# Patient Record
Sex: MALE | Race: WHITE | NOT HISPANIC OR LATINO | Employment: OTHER | URBAN - METROPOLITAN AREA
[De-identification: names, ages, dates, MRNs, and addresses within clinical notes are randomized per-mention and may not be internally consistent; named-entity substitution may affect disease eponyms.]

---

## 2017-01-04 ENCOUNTER — LAB (OUTPATIENT)
Dept: LAB | Facility: HOSPITAL | Age: 52
End: 2017-01-04
Attending: UROLOGY

## 2017-01-04 ENCOUNTER — HOSPITAL ENCOUNTER (OUTPATIENT)
Dept: CARDIOLOGY | Facility: HOSPITAL | Age: 52
Discharge: HOME | End: 2017-01-04
Attending: UROLOGY | Admitting: UROLOGY

## 2017-01-04 ENCOUNTER — TRANSCRIBE ORDERS (OUTPATIENT)
Dept: ADMINISTRATIVE | Facility: HOSPITAL | Age: 52
End: 2017-01-04

## 2017-01-04 DIAGNOSIS — N43.3 HYDROCELE, UNSPECIFIED: ICD-10-CM

## 2017-01-04 DIAGNOSIS — K40.91 UNILATERAL INGUINAL HERNIA WITHOUT OBSTRUCTION OR GANGRENE, RECURRENT: Primary | ICD-10-CM

## 2017-01-04 DIAGNOSIS — K40.91 UNILATERAL INGUINAL HERNIA WITHOUT OBSTRUCTION OR GANGRENE, RECURRENT: ICD-10-CM

## 2017-01-04 LAB
ANION GAP SERPL CALCULATED.3IONS-SCNC: 16 MMOL/L
BASOPHILS # BLD AUTO: 0.01 10*3/MM3 (ref 0–0.2)
BASOPHILS NFR BLD AUTO: 0.2 % (ref 0–2)
BUN BLD-MCNC: 15 MG/DL (ref 6–20)
BUN/CREAT SERPL: 16.5 (ref 7–25)
CALCIUM SPEC-SCNC: 9.1 MG/DL (ref 8.6–10.5)
CHLORIDE SERPL-SCNC: 96 MMOL/L (ref 98–107)
CO2 SERPL-SCNC: 24 MMOL/L (ref 22–29)
CREAT BLD-MCNC: 0.91 MG/DL (ref 0.76–1.27)
DEPRECATED RDW RBC AUTO: 41.1 FL (ref 37–54)
EOSINOPHIL # BLD AUTO: 0.18 10*3/MM3 (ref 0.1–0.3)
EOSINOPHIL NFR BLD AUTO: 2.7 % (ref 0–4)
ERYTHROCYTE [DISTWIDTH] IN BLOOD BY AUTOMATED COUNT: 13.2 % (ref 11.5–14.5)
GFR SERPL CREATININE-BSD FRML MDRD: 88 ML/MIN/1.73
GLUCOSE BLD-MCNC: 279 MG/DL (ref 65–99)
HCT VFR BLD AUTO: 46.4 % (ref 42–52)
HGB BLD-MCNC: 15.9 G/DL (ref 14–18)
IMM GRANULOCYTES # BLD: 0.03 10*3/MM3 (ref 0–0.03)
IMM GRANULOCYTES NFR BLD: 0.5 % (ref 0–0.5)
LYMPHOCYTES # BLD AUTO: 2.01 10*3/MM3 (ref 0.6–4.8)
LYMPHOCYTES NFR BLD AUTO: 30.4 % (ref 20–45)
MCH RBC QN AUTO: 29.3 PG (ref 27–31)
MCHC RBC AUTO-ENTMCNC: 34.3 G/DL (ref 31–37)
MCV RBC AUTO: 85.5 FL (ref 80–94)
MONOCYTES # BLD AUTO: 0.39 10*3/MM3 (ref 0–1)
MONOCYTES NFR BLD AUTO: 5.9 % (ref 3–8)
NEUTROPHILS # BLD AUTO: 4 10*3/MM3 (ref 1.5–8.3)
NEUTROPHILS NFR BLD AUTO: 60.3 % (ref 45–70)
NRBC BLD MANUAL-RTO: 0 /100 WBC (ref 0–0)
PLATELET # BLD AUTO: 217 10*3/MM3 (ref 140–500)
PMV BLD AUTO: 9.2 FL (ref 7.4–10.4)
POTASSIUM BLD-SCNC: 3.7 MMOL/L (ref 3.5–5.2)
RBC # BLD AUTO: 5.43 10*6/MM3 (ref 4.7–6.1)
SODIUM BLD-SCNC: 136 MMOL/L (ref 136–145)
WBC NRBC COR # BLD: 6.62 10*3/MM3 (ref 4.8–10.8)

## 2017-01-04 PROCEDURE — 85025 COMPLETE CBC W/AUTO DIFF WBC: CPT

## 2017-01-04 PROCEDURE — 80048 BASIC METABOLIC PNL TOTAL CA: CPT

## 2017-01-04 PROCEDURE — 93010 ELECTROCARDIOGRAM REPORT: CPT | Performed by: INTERNAL MEDICINE

## 2017-01-04 PROCEDURE — 93005 ELECTROCARDIOGRAM TRACING: CPT

## 2017-01-04 PROCEDURE — 36415 COLL VENOUS BLD VENIPUNCTURE: CPT

## 2017-01-13 ENCOUNTER — HOSPITAL ENCOUNTER (OUTPATIENT)
Dept: OTHER | Facility: HOSPITAL | Age: 52
Setting detail: SPECIMEN
Discharge: HOME | End: 2017-01-13
Attending: UROLOGY | Admitting: UROLOGY

## 2017-01-18 RX ORDER — LISINOPRIL 10 MG/1
TABLET ORAL
Qty: 90 TABLET | Refills: 1 | Status: SHIPPED | OUTPATIENT
Start: 2017-01-18 | End: 2017-03-15 | Stop reason: SDUPTHER

## 2017-02-15 ENCOUNTER — HOSPITAL ENCOUNTER (EMERGENCY)
Facility: HOSPITAL | Age: 52
Discharge: HOME | End: 2017-02-15
Attending: EMERGENCY MEDICINE | Admitting: EMERGENCY MEDICINE

## 2017-02-15 ENCOUNTER — APPOINTMENT (OUTPATIENT)
Dept: GENERAL RADIOLOGY | Facility: HOSPITAL | Age: 52
End: 2017-02-15

## 2017-02-15 VITALS
DIASTOLIC BLOOD PRESSURE: 79 MMHG | BODY MASS INDEX: 33.88 KG/M2 | TEMPERATURE: 99.1 F | RESPIRATION RATE: 18 BRPM | HEIGHT: 71 IN | HEART RATE: 67 BPM | SYSTOLIC BLOOD PRESSURE: 125 MMHG | WEIGHT: 242 LBS | OXYGEN SATURATION: 92 %

## 2017-02-15 DIAGNOSIS — R09.1 PLEURISY: Primary | ICD-10-CM

## 2017-02-15 LAB
ALBUMIN SERPL-MCNC: 4.2 G/DL (ref 3.5–5.2)
ALBUMIN/GLOB SERPL: 1.6 G/DL
ALP SERPL-CCNC: 67 U/L (ref 40–129)
ALT SERPL W P-5'-P-CCNC: 24 U/L (ref 5–41)
ANION GAP SERPL CALCULATED.3IONS-SCNC: 14.2 MMOL/L
AST SERPL-CCNC: 20 U/L (ref 5–40)
BASOPHILS # BLD AUTO: 0.02 10*3/MM3 (ref 0–0.2)
BASOPHILS NFR BLD AUTO: 0.2 % (ref 0–2)
BILIRUB SERPL-MCNC: 0.8 MG/DL (ref 0.2–1.2)
BUN BLD-MCNC: 11 MG/DL (ref 6–20)
BUN/CREAT SERPL: 12.6 (ref 7–25)
CALCIUM SPEC-SCNC: 9.4 MG/DL (ref 8.6–10.5)
CHLORIDE SERPL-SCNC: 100 MMOL/L (ref 98–107)
CO2 SERPL-SCNC: 23.8 MMOL/L (ref 22–29)
CREAT BLD-MCNC: 0.87 MG/DL (ref 0.76–1.27)
D DIMER PPP FEU-MCNC: 0.35 MCGFEU/ML (ref 0–0.46)
DEPRECATED RDW RBC AUTO: 42.7 FL (ref 37–54)
EOSINOPHIL # BLD AUTO: 0.25 10*3/MM3 (ref 0.1–0.3)
EOSINOPHIL NFR BLD AUTO: 2.9 % (ref 0–4)
ERYTHROCYTE [DISTWIDTH] IN BLOOD BY AUTOMATED COUNT: 13.8 % (ref 11.5–14.5)
GFR SERPL CREATININE-BSD FRML MDRD: 93 ML/MIN/1.73
GLOBULIN UR ELPH-MCNC: 2.7 GM/DL
GLUCOSE BLD-MCNC: 238 MG/DL (ref 65–99)
HCT VFR BLD AUTO: 43.4 % (ref 42–52)
HGB BLD-MCNC: 14.8 G/DL (ref 14–18)
IMM GRANULOCYTES # BLD: 0.02 10*3/MM3 (ref 0–0.03)
IMM GRANULOCYTES NFR BLD: 0.2 % (ref 0–0.5)
LYMPHOCYTES # BLD AUTO: 2.31 10*3/MM3 (ref 0.6–4.8)
LYMPHOCYTES NFR BLD AUTO: 26.9 % (ref 20–45)
MCH RBC QN AUTO: 29.2 PG (ref 27–31)
MCHC RBC AUTO-ENTMCNC: 34.1 G/DL (ref 31–37)
MCV RBC AUTO: 85.6 FL (ref 80–94)
MONOCYTES # BLD AUTO: 0.54 10*3/MM3 (ref 0–1)
MONOCYTES NFR BLD AUTO: 6.3 % (ref 3–8)
NEUTROPHILS # BLD AUTO: 5.45 10*3/MM3 (ref 1.5–8.3)
NEUTROPHILS NFR BLD AUTO: 63.5 % (ref 45–70)
NRBC BLD MANUAL-RTO: 0 /100 WBC (ref 0–0)
PLATELET # BLD AUTO: 227 10*3/MM3 (ref 140–500)
PMV BLD AUTO: 9.5 FL (ref 7.4–10.4)
POTASSIUM BLD-SCNC: 4.1 MMOL/L (ref 3.5–5.2)
PROT SERPL-MCNC: 6.9 G/DL (ref 6–8.5)
RBC # BLD AUTO: 5.07 10*6/MM3 (ref 4.7–6.1)
SODIUM BLD-SCNC: 138 MMOL/L (ref 136–145)
TROPONIN T SERPL-MCNC: <0.01 NG/ML (ref 0–0.03)
WBC NRBC COR # BLD: 8.59 10*3/MM3 (ref 4.8–10.8)

## 2017-02-15 PROCEDURE — 80053 COMPREHEN METABOLIC PANEL: CPT | Performed by: EMERGENCY MEDICINE

## 2017-02-15 PROCEDURE — 85379 FIBRIN DEGRADATION QUANT: CPT | Performed by: EMERGENCY MEDICINE

## 2017-02-15 PROCEDURE — 99284 EMERGENCY DEPT VISIT MOD MDM: CPT

## 2017-02-15 PROCEDURE — 93005 ELECTROCARDIOGRAM TRACING: CPT | Performed by: EMERGENCY MEDICINE

## 2017-02-15 PROCEDURE — 85025 COMPLETE CBC W/AUTO DIFF WBC: CPT | Performed by: EMERGENCY MEDICINE

## 2017-02-15 PROCEDURE — 84484 ASSAY OF TROPONIN QUANT: CPT | Performed by: EMERGENCY MEDICINE

## 2017-02-15 PROCEDURE — 93010 ELECTROCARDIOGRAM REPORT: CPT | Performed by: INTERNAL MEDICINE

## 2017-02-15 PROCEDURE — 71020 HC CHEST PA AND LATERAL: CPT

## 2017-02-15 RX ORDER — DICLOFENAC SODIUM 75 MG/1
75 TABLET, DELAYED RELEASE ORAL 2 TIMES DAILY
Qty: 20 TABLET | Refills: 0 | Status: SHIPPED | OUTPATIENT
Start: 2017-02-15 | End: 2017-02-25

## 2017-02-15 NOTE — ED PROVIDER NOTES
"Subjective     History provided by:  Patient and spouse    History of Present Illness    · Chief complaint: Chest pain    · Location: Left lateral lower chest in the midaxillary line    · Quality/Severity: \"Like a nail is sticking in me\".  The patient states that he is not short of breath, but that it hurts to breathe.    · Timing/Onset: The pain started 2-1/2 weeks ago.    · Modifying Factors: Breathing and bending over exacerbates pain.  Holding still and not breathing alleviates pain.    · Associated symptoms: The patient reports an occasional nonproductive cough.  His wife states his blood pressure was low today at 97/50.    Narrative: The patient is a 51-year-old white male complaining of a 2-1/2 week history of a sharp stabbing pain in his left lateral lower chest when he breathes or bends over.  There is no pain at rest without breathing.  The patient is not sought medical attention for this complaint until now.  He has an occasional nonproductive cough.  The patient has a history of degenerative disc disease and is status post surgery with rods and plating of his lumbar and cervical spine.  He also has a history not significant for diabetes mellitus hypertension gout and cholesterol.  He is status post left inguinal hernia repair by Dr. Her January 13 of this year.  The patient is a smoker of cigarettes a half pack per day and smokes 8 height that he does inhale.    ED Triage Vitals   Temp Heart Rate Resp BP SpO2   02/15/17 1615 02/15/17 1614 02/15/17 1614 02/15/17 1614 02/15/17 1614   99.1 °F (37.3 °C) 73 18 149/94 94 %      Temp src Heart Rate Source Patient Position BP Location FiO2 (%)   -- -- -- -- --              Review of Systems   Constitutional: Negative for activity change, appetite change, chills, diaphoresis, fatigue and fever.   HENT: Negative for congestion, dental problem, ear pain, hearing loss, mouth sores, postnasal drip, rhinorrhea, sinus pressure, sore throat and voice change.    Eyes: " Negative for photophobia, pain, discharge, redness and visual disturbance.   Respiratory: Positive for cough. Negative for chest tightness, shortness of breath, wheezing and stridor.         Patient states it hurts to breathe   Cardiovascular: Positive for chest pain. Negative for palpitations and leg swelling.   Gastrointestinal: Negative for abdominal pain, diarrhea, nausea and vomiting.   Genitourinary: Negative for difficulty urinating, dysuria, flank pain, frequency, hematuria and urgency.   Musculoskeletal: Negative for arthralgias, back pain, gait problem, joint swelling, myalgias, neck pain and neck stiffness.   Skin: Negative for color change and rash.   Neurological: Negative for dizziness, tremors, seizures, syncope, facial asymmetry, speech difficulty, weakness, light-headedness, numbness and headaches.   Hematological: Negative for adenopathy.   Psychiatric/Behavioral: Negative.  Negative for confusion and decreased concentration. The patient is not nervous/anxious.        Past Medical History   Diagnosis Date   • Diabetes mellitus    • Gout    • Hyperlipidemia    • Hypertension    • Kidney stone        Allergies   Allergen Reactions   • Fexofenadine-Pseudoephed Er        Past Surgical History   Procedure Laterality Date   • Knee arthroplasty     • Shoulder arthroscopy     • Back surgery     • Neck surgery     • Elbow arthroplasty     • Ankle arthroplasty     • Leg surgery     • Tonsillectomy     • Adenoidectomy     • Gallbladder surgery     • Hernia repair     • Cholecystectomy     • Testicle surgery         Family History   Problem Relation Age of Onset   • Stroke Mother    • Hypertension Mother    • Alzheimer's disease Father    • Dementia Father    • Hypertension Father    • Alcohol abuse Sister    • Hypertension Sister        Social History     Social History   • Marital status:      Spouse name: N/A   • Number of children: N/A   • Years of education: N/A     Social History Main Topics   •  Smoking status: Current Some Day Smoker     Types: Pipe   • Smokeless tobacco: Current User     Types: Chew      Comment: 1-2 times a month   • Alcohol use Yes      Comment: monthly   • Drug use: No   • Sexual activity: Defer     Other Topics Concern   • None     Social History Narrative   • None           Objective   Physical Exam   Constitutional: He is oriented to person, place, and time. He appears well-developed and well-nourished. No distress.   HENT:   Head: Normocephalic and atraumatic.   Nose: Nose normal.   Mouth/Throat: Oropharynx is clear and moist. No oropharyngeal exudate.   Eyes: Conjunctivae and EOM are normal. Pupils are equal, round, and reactive to light. Right eye exhibits no discharge. Left eye exhibits no discharge. No scleral icterus.   Neck: Normal range of motion. Neck supple. No JVD present. No thyromegaly present.   Cardiovascular: Normal rate, regular rhythm and normal heart sounds.    No murmur heard.  Pulmonary/Chest: Effort normal and breath sounds normal. He has no wheezes. He has no rales. He exhibits tenderness.   There is left lateral lower chest wall tenderness to pressure in the midaxillary line that reproduces the patient's discomfort.   Abdominal: Soft. Bowel sounds are normal. He exhibits no distension. There is no tenderness.   Musculoskeletal: Normal range of motion. He exhibits no edema, tenderness or deformity.   Lymphadenopathy:     He has no cervical adenopathy.   Neurological: He is alert and oriented to person, place, and time. No cranial nerve deficit. Coordination normal.   No focal motor sensory deficit   Skin: Skin is warm and dry. No rash noted. He is not diaphoretic.   Psychiatric: He has a normal mood and affect. His behavior is normal. Judgment and thought content normal.   Nursing note and vitals reviewed.      ECG 12 Lead    Date/Time: 2/15/2017 5:49 PM  Performed by: YUSRA KINGSTON.  Authorized by: YUSRA KINGSTON   Comments: EKG performed 16:09, EKG read  16:11.  Normal sinus rhythm with a rate of 72, normal axis, normal conduction, no acute ST segment elevation or depression consistent with ischemia, inverted T waves V1 through V4 that is mild and nonspecific, no ectopy, normal CA and QT intervals.               ED Course  ED Course   Comment By Time   [facility] Report [id number] shows the patient filled oxycodone 10 mg #90 monthly up until January 14 prescribed by a doctor She[place] in [place]. Dimitris Doherty MD 02/15 1306                  MDM  Number of Diagnoses or Management Options  Pleurisy: new and requires workup     Amount and/or Complexity of Data Reviewed  Clinical lab tests: ordered and reviewed  Tests in the radiology section of CPT®: ordered and reviewed  Independent visualization of images, tracings, or specimens: yes    Risk of Complications, Morbidity, and/or Mortality  Presenting problems: high  Diagnostic procedures: high  Management options: high  General comments: My differential diagnosis for chest pain includes but is not limited to:  Muscle strain, costochondritis, myositis, pleurisy, rib fracture, intercostal neuritis, herpes zoster, tumor, myocardial infarction, coronary syndrome, unstable angina, angina, aortic dissection, mitral valve prolapse, pericarditis, palpitations, pulmonary embolus, pneumonia, pneumothorax, lung cancer, GERD, esophagitis, esophageal spasm    Patient Progress  Patient progress: stable      Final diagnoses:   Pleurisy            Dimitris Doherty MD  02/01/23 7215

## 2017-02-23 ENCOUNTER — OFFICE VISIT (OUTPATIENT)
Dept: FAMILY MEDICINE CLINIC | Facility: CLINIC | Age: 52
End: 2017-02-23

## 2017-02-23 VITALS
DIASTOLIC BLOOD PRESSURE: 82 MMHG | HEIGHT: 71 IN | OXYGEN SATURATION: 98 % | BODY MASS INDEX: 34.4 KG/M2 | TEMPERATURE: 98 F | RESPIRATION RATE: 18 BRPM | SYSTOLIC BLOOD PRESSURE: 140 MMHG | WEIGHT: 245.7 LBS | HEART RATE: 84 BPM

## 2017-02-23 DIAGNOSIS — IMO0001 UNCONTROLLED TYPE 2 DIABETES MELLITUS WITHOUT COMPLICATION, WITHOUT LONG-TERM CURRENT USE OF INSULIN: ICD-10-CM

## 2017-02-23 DIAGNOSIS — E53.8 VITAMIN B12 DEFICIENCY: ICD-10-CM

## 2017-02-23 DIAGNOSIS — Z12.5 PROSTATE CANCER SCREENING: ICD-10-CM

## 2017-02-23 DIAGNOSIS — M1A.0490 IDIOPATHIC CHRONIC GOUT OF HAND WITHOUT TOPHUS, UNSPECIFIED LATERALITY: ICD-10-CM

## 2017-02-23 DIAGNOSIS — I10 ESSENTIAL (PRIMARY) HYPERTENSION: ICD-10-CM

## 2017-02-23 DIAGNOSIS — R51.9 CHRONIC NONINTRACTABLE HEADACHE, UNSPECIFIED HEADACHE TYPE: Primary | ICD-10-CM

## 2017-02-23 DIAGNOSIS — G89.29 CHRONIC NONINTRACTABLE HEADACHE, UNSPECIFIED HEADACHE TYPE: Primary | ICD-10-CM

## 2017-02-23 DIAGNOSIS — E78.00 PURE HYPERCHOLESTEROLEMIA: ICD-10-CM

## 2017-02-23 PROCEDURE — 99214 OFFICE O/P EST MOD 30 MIN: CPT | Performed by: INTERNAL MEDICINE

## 2017-02-23 RX ORDER — AMLODIPINE BESYLATE 5 MG/1
5 TABLET ORAL DAILY
Qty: 30 TABLET | Refills: 5 | Status: SHIPPED | OUTPATIENT
Start: 2017-02-23 | End: 2017-07-19 | Stop reason: SDUPTHER

## 2017-02-23 RX ORDER — CEPHALEXIN 500 MG/1
250 CAPSULE ORAL 2 TIMES DAILY
COMMUNITY
End: 2017-07-19

## 2017-02-23 NOTE — PROGRESS NOTES
Subjective   Patient ID: Flaco Miles is a 51 y.o. male presents with   Chief Complaint   Patient presents with   • Follow-up     on lung issues       HPI - this patient's here today for follow-up of pleurisy.  About a week ago he had pleuritic pain and was seen by an urgent care doctor he was given diclofenac and the pleurisy is gone he feels fine his chest x-ray was clear.  The patient also complains of left sided headaches that occur about 3 times a week.  He describes it as a burning and a throbbing.  His blood pressure has been running just a little high.  He's also had some mild visual changes but I am not sure that his sugars controlled.    Assessment plan    Headache and hypertension continue lisinopril 10 AM had Norvasc 5 at bedtime    Vitamin B12 deficiency check a vitamin B12    Uncontrolled type 2 diabetes he's on metformin and will check an A1c    Pleurisy resolved    Gout we'll check a uric acid level he's on allopurinol    Prostate cancer screening will check a PSA    Hypercholesterolemia Lipitor 20 we'll check a fasting lipid profile.        Allergies   Allergen Reactions   • Fexofenadine-Pseudoephed Er        The following portions of the patient's history were reviewed and updated as appropriate: allergies, current medications, past family history, past medical history, past social history, past surgical history and problem list.      Review of Systems   Constitutional: Positive for fatigue.   Eyes: Positive for visual disturbance.   Respiratory: Negative.    Cardiovascular: Negative.    Gastrointestinal: Negative.    Endocrine: Negative.    Genitourinary: Negative.    Musculoskeletal: Positive for arthralgias, back pain, gait problem, joint swelling, myalgias and neck pain.   Neurological: Positive for headaches.   Psychiatric/Behavioral: Negative.        Objective     Vitals:    02/23/17 1102   BP: 140/82   Pulse: 84   Resp: 18   Temp: 98 °F (36.7 °C)   TempSrc: Oral   SpO2: 98%   Weight: 245  "lb 11.2 oz (111 kg)   Height: 70.5\" (179.1 cm)         Physical Exam   Constitutional: He is oriented to person, place, and time. He appears well-developed and well-nourished. No distress.   HENT:   Head: Normocephalic and atraumatic.   Eyes: Conjunctivae and EOM are normal. Pupils are equal, round, and reactive to light. Right eye exhibits no discharge. Left eye exhibits no discharge. No scleral icterus.   Neck: Normal range of motion. Neck supple. No tracheal deviation present. No thyromegaly present.   Cardiovascular: Normal rate, regular rhythm, normal heart sounds, intact distal pulses and normal pulses.  Exam reveals no gallop.    No murmur heard.  Pulmonary/Chest: Effort normal and breath sounds normal. No respiratory distress. He has no wheezes. He has no rales.   Musculoskeletal: Normal range of motion.   Neurological: He is alert and oriented to person, place, and time.   Skin: Skin is warm. No rash noted. No erythema. No pallor.   Psychiatric: He has a normal mood and affect. His behavior is normal. Judgment and thought content normal.   Nursing note and vitals reviewed.        Flaco was seen today for follow-up.    Diagnoses and all orders for this visit:    Chronic nonintractable headache, unspecified headache type  -     PSA  -     Lipid Panel  -     Hemoglobin A1c  -     Microalbumin / Creatinine Urine Ratio  -     Comprehensive Metabolic Panel  -     Vitamin B12  -     Uric Acid    Vitamin B12 deficiency  -     PSA  -     Lipid Panel  -     Hemoglobin A1c  -     Microalbumin / Creatinine Urine Ratio  -     Comprehensive Metabolic Panel  -     Vitamin B12  -     Uric Acid    Uncontrolled type 2 diabetes mellitus without complication, without long-term current use of insulin  -     PSA  -     Lipid Panel  -     Hemoglobin A1c  -     Microalbumin / Creatinine Urine Ratio  -     Comprehensive Metabolic Panel  -     Vitamin B12  -     Uric Acid    Essential (primary) hypertension  -     PSA  -     Lipid " Panel  -     Hemoglobin A1c  -     Microalbumin / Creatinine Urine Ratio  -     Comprehensive Metabolic Panel  -     Vitamin B12  -     Uric Acid    Pure hypercholesterolemia  -     PSA  -     Lipid Panel  -     Hemoglobin A1c  -     Microalbumin / Creatinine Urine Ratio  -     Comprehensive Metabolic Panel  -     Vitamin B12  -     Uric Acid    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        Call or return to clinic prn if these symptoms worsen or fail to improve as anticipated.

## 2017-02-25 LAB
ALBUMIN SERPL-MCNC: 4.6 G/DL (ref 3.5–5.2)
ALBUMIN/CREAT UR: 22.9 MG/G CREAT (ref 0–30)
ALBUMIN/GLOB SERPL: 1.7 G/DL
ALP SERPL-CCNC: 64 U/L (ref 39–117)
ALT SERPL-CCNC: 40 U/L (ref 1–41)
AST SERPL-CCNC: 30 U/L (ref 1–40)
BILIRUB SERPL-MCNC: 2 MG/DL (ref 0.1–1.2)
BUN SERPL-MCNC: 18 MG/DL (ref 6–20)
BUN/CREAT SERPL: 20.9 (ref 7–25)
CALCIUM SERPL-MCNC: 10 MG/DL (ref 8.6–10.5)
CHLORIDE SERPL-SCNC: 101 MMOL/L (ref 98–107)
CHOLEST SERPL-MCNC: 165 MG/DL (ref 0–200)
CO2 SERPL-SCNC: 27.7 MMOL/L (ref 22–29)
CREAT SERPL-MCNC: 0.86 MG/DL (ref 0.76–1.27)
CREAT UR-MCNC: 238.9 MG/DL
GLOBULIN SER CALC-MCNC: 2.7 GM/DL
GLUCOSE SERPL-MCNC: 136 MG/DL (ref 65–99)
HBA1C MFR BLD: 6.73 % (ref 4.8–5.6)
HDLC SERPL-MCNC: 41 MG/DL (ref 40–60)
LDLC SERPL CALC-MCNC: 89 MG/DL (ref 0–100)
MICROALBUMIN UR-MCNC: 54.7 UG/ML
POTASSIUM SERPL-SCNC: 4.5 MMOL/L (ref 3.5–5.2)
PROT SERPL-MCNC: 7.3 G/DL (ref 6–8.5)
PSA SERPL-MCNC: 3.1 NG/ML (ref 0–4)
SODIUM SERPL-SCNC: 143 MMOL/L (ref 136–145)
TRIGL SERPL-MCNC: 173 MG/DL (ref 0–150)
URATE SERPL-MCNC: 4.8 MG/DL (ref 3.4–7)
VIT B12 SERPL-MCNC: 662 PG/ML (ref 211–946)
VLDLC SERPL CALC-MCNC: 34.6 MG/DL (ref 5–40)

## 2017-02-27 ENCOUNTER — TELEPHONE (OUTPATIENT)
Dept: FAMILY MEDICINE CLINIC | Facility: CLINIC | Age: 52
End: 2017-02-27

## 2017-02-27 NOTE — TELEPHONE ENCOUNTER
----- Message from Hayden Morgan MD sent at 2/27/2017  7:51 AM EST -----  Check to see if the patient's headaches are any better      Pt informed and no headaches are no better

## 2017-03-02 ENCOUNTER — OFFICE VISIT (OUTPATIENT)
Dept: FAMILY MEDICINE CLINIC | Facility: CLINIC | Age: 52
End: 2017-03-02

## 2017-03-02 VITALS
WEIGHT: 245 LBS | HEART RATE: 78 BPM | BODY MASS INDEX: 34.3 KG/M2 | RESPIRATION RATE: 16 BRPM | DIASTOLIC BLOOD PRESSURE: 68 MMHG | OXYGEN SATURATION: 94 % | HEIGHT: 71 IN | SYSTOLIC BLOOD PRESSURE: 126 MMHG

## 2017-03-02 DIAGNOSIS — R51.9 CHRONIC INTRACTABLE HEADACHE, UNSPECIFIED HEADACHE TYPE: Primary | ICD-10-CM

## 2017-03-02 DIAGNOSIS — G89.29 CHRONIC INTRACTABLE HEADACHE, UNSPECIFIED HEADACHE TYPE: Primary | ICD-10-CM

## 2017-03-02 DIAGNOSIS — G44.59 OTHER COMPLICATED HEADACHE SYNDROME: ICD-10-CM

## 2017-03-02 DIAGNOSIS — E78.00 PURE HYPERCHOLESTEROLEMIA: ICD-10-CM

## 2017-03-02 DIAGNOSIS — I10 ESSENTIAL (PRIMARY) HYPERTENSION: ICD-10-CM

## 2017-03-02 DIAGNOSIS — E53.8 VITAMIN B12 DEFICIENCY: ICD-10-CM

## 2017-03-02 DIAGNOSIS — E11.9 DIABETES MELLITUS TYPE 2, NONINSULIN DEPENDENT (HCC): ICD-10-CM

## 2017-03-02 PROCEDURE — 99214 OFFICE O/P EST MOD 30 MIN: CPT | Performed by: INTERNAL MEDICINE

## 2017-03-02 RX ORDER — TAMSULOSIN HYDROCHLORIDE 0.4 MG/1
CAPSULE ORAL
COMMUNITY
Start: 2017-02-23 | End: 2017-07-19 | Stop reason: SDUPTHER

## 2017-03-02 NOTE — PROGRESS NOTES
"Subjective   Patient ID: Flaco Miles is a 51 y.o. male presents with   Chief Complaint   Patient presents with   • Follow-up     on labs       HPI - this patient's here today to review labs.  Overall his cholesterol panel has improved his uric acid levels down his blood pressures under control.  A1c is gone up just a bit but he says he's not been eating like he should.  He complains of chronic headaches been going on for a few years he also gets some visual changes with these.  He describes as a throbbing pressure on one side of his head.  Also describes a burning and visual changes.    Assessment plan    Chronic intractable headache-MRI of the brain    Hypertension controlled with amlodipine and lisinopril    Type 2 diabetes controlled with metformin, recommended weight loss    Gout controlled with allopurinol.    Allergies   Allergen Reactions   • Fexofenadine-Pseudoephed Er        The following portions of the patient's history were reviewed and updated as appropriate: allergies, current medications, past family history, past medical history, past social history, past surgical history and problem list.      Review of Systems   Constitutional: Positive for fatigue.   Eyes: Positive for visual disturbance.   Respiratory: Negative.    Cardiovascular: Negative.    Gastrointestinal: Negative.    Endocrine: Negative.    Genitourinary: Negative.    Musculoskeletal: Positive for arthralgias, back pain, gait problem, myalgias and neck pain. Negative for neck stiffness.   Skin: Negative.    Allergic/Immunologic: Negative.    Neurological: Positive for headaches.   Hematological: Negative.    Psychiatric/Behavioral: Negative.        Objective     Vitals:    03/02/17 1057   BP: 126/68   Pulse: 78   Resp: 16   SpO2: 94%   Weight: 245 lb (111 kg)   Height: 70.5\" (179.1 cm)         Physical Exam   Constitutional: He is oriented to person, place, and time. He appears well-developed and well-nourished. No distress.   HENT: "   Head: Normocephalic and atraumatic.   Eyes: Conjunctivae and EOM are normal. Pupils are equal, round, and reactive to light. Right eye exhibits no discharge. Left eye exhibits no discharge. No scleral icterus.   Neck: Normal range of motion. Neck supple. No tracheal deviation present. No thyromegaly present.   Cardiovascular: Normal rate, regular rhythm, normal heart sounds and normal pulses.  Exam reveals no gallop.    No murmur heard.  Pulmonary/Chest: Effort normal and breath sounds normal. No respiratory distress. He has no wheezes. He has no rales.   Musculoskeletal: Normal range of motion.   Neurological: He is alert and oriented to person, place, and time.   Skin: Skin is warm. No rash noted. No erythema. No pallor.   Psychiatric: He has a normal mood and affect. His behavior is normal. Judgment and thought content normal.   Nursing note and vitals reviewed.        Flaco was seen today for follow-up.    Diagnoses and all orders for this visit:    Chronic intractable headache, unspecified headache type  -     MRI Brain With & Without Contrast; Future    Other complicated headache syndrome    Essential (primary) hypertension    Pure hypercholesterolemia    Vitamin B12 deficiency    Diabetes mellitus type 2, noninsulin dependent        Call or return to clinic prn if these symptoms worsen or fail to improve as anticipated.

## 2017-03-08 ENCOUNTER — HOSPITAL ENCOUNTER (OUTPATIENT)
Dept: MRI IMAGING | Facility: HOSPITAL | Age: 52
Discharge: HOME | End: 2017-03-08
Admitting: INTERNAL MEDICINE

## 2017-03-08 DIAGNOSIS — G89.29 CHRONIC INTRACTABLE HEADACHE, UNSPECIFIED HEADACHE TYPE: ICD-10-CM

## 2017-03-08 DIAGNOSIS — R51.9 CHRONIC INTRACTABLE HEADACHE, UNSPECIFIED HEADACHE TYPE: ICD-10-CM

## 2017-03-08 PROCEDURE — A9577 INJ MULTIHANCE: HCPCS | Performed by: INTERNAL MEDICINE

## 2017-03-08 PROCEDURE — 0 GADOBENATE DIMEGLUMINE 529 MG/ML SOLUTION: Performed by: INTERNAL MEDICINE

## 2017-03-08 PROCEDURE — 70553 MRI BRAIN STEM W/O & W/DYE: CPT

## 2017-03-08 RX ADMIN — GADOBENATE DIMEGLUMINE 20 ML: 529 INJECTION, SOLUTION INTRAVENOUS at 14:30

## 2017-03-13 RX ORDER — ALLOPURINOL 300 MG/1
TABLET ORAL
Qty: 30 TABLET | Refills: 6 | Status: SHIPPED | OUTPATIENT
Start: 2017-03-13 | End: 2017-07-19

## 2017-03-15 ENCOUNTER — OFFICE VISIT (OUTPATIENT)
Dept: FAMILY MEDICINE CLINIC | Facility: CLINIC | Age: 52
End: 2017-03-15

## 2017-03-15 VITALS
WEIGHT: 245 LBS | BODY MASS INDEX: 34.3 KG/M2 | OXYGEN SATURATION: 94 % | HEART RATE: 80 BPM | SYSTOLIC BLOOD PRESSURE: 120 MMHG | HEIGHT: 71 IN | RESPIRATION RATE: 16 BRPM | DIASTOLIC BLOOD PRESSURE: 74 MMHG | TEMPERATURE: 98 F

## 2017-03-15 DIAGNOSIS — R51.9 CHRONIC INTRACTABLE HEADACHE, UNSPECIFIED HEADACHE TYPE: ICD-10-CM

## 2017-03-15 DIAGNOSIS — G89.29 CHRONIC INTRACTABLE HEADACHE, UNSPECIFIED HEADACHE TYPE: ICD-10-CM

## 2017-03-15 DIAGNOSIS — I10 ESSENTIAL (PRIMARY) HYPERTENSION: Primary | ICD-10-CM

## 2017-03-15 PROCEDURE — 99213 OFFICE O/P EST LOW 20 MIN: CPT | Performed by: INTERNAL MEDICINE

## 2017-03-15 RX ORDER — SULFAMETHOXAZOLE AND TRIMETHOPRIM 800; 160 MG/1; MG/1
1 TABLET ORAL 2 TIMES DAILY
COMMUNITY
End: 2017-07-19

## 2017-03-15 RX ORDER — IBUPROFEN 800 MG/1
800 TABLET ORAL EVERY 6 HOURS PRN
COMMUNITY
End: 2017-07-19

## 2017-03-15 RX ORDER — LISINOPRIL 10 MG/1
10 TABLET ORAL DAILY
Qty: 90 TABLET | Refills: 1 | Status: SHIPPED | OUTPATIENT
Start: 2017-03-15 | End: 2017-07-19

## 2017-03-15 NOTE — PROGRESS NOTES
"Subjective   Patient ID: Flaco Miles is a 51 y.o. male presents with   Chief Complaint   Patient presents with   • MRI Results       HPI  - this patient had been having headaches for quite a while we did an MRI of his brain was essentially normal.  I started him on an extra blood pressure medicine last time, amlodipine 5 mg he's also on lisinopril 10 mg.  He says that since then his headaches have resolved.    Assessment plan    Hypertension now controlled with lisinopril and amlodipine.    Neuropathy improved with gabapentin.    Headaches resolved.    Allergies   Allergen Reactions   • Fexofenadine-Pseudoephed Er        The following portions of the patient's history were reviewed and updated as appropriate: allergies, current medications, past family history, past medical history, past social history, past surgical history and problem list.      Review of Systems   Constitutional: Negative.    Respiratory: Negative.    Cardiovascular: Negative.    Neurological: Positive for numbness. Negative for headaches.       Objective     Vitals:    03/15/17 1123   BP: 120/74   Pulse: 80   Resp: 16   Temp: 98 °F (36.7 °C)   TempSrc: Oral   SpO2: 94%   Weight: 245 lb (111 kg)   Height: 70.5\" (179.1 cm)         Physical Exam   Constitutional: He is oriented to person, place, and time. He appears well-developed and well-nourished.   HENT:   Head: Normocephalic and atraumatic.   Neurological: He is alert and oriented to person, place, and time.   Psychiatric: He has a normal mood and affect. His behavior is normal.   Nursing note and vitals reviewed.        Flaco was seen today for mri results.    Diagnoses and all orders for this visit:    Essential (primary) hypertension    Chronic intractable headache, unspecified headache type    Other orders  -     lisinopril (PRINIVIL,ZESTRIL) 10 MG tablet; Take 1 tablet by mouth Daily.      Call or return to clinic prn if these symptoms worsen or fail to improve as anticipated.  "

## 2017-05-18 RX ORDER — ATORVASTATIN CALCIUM 20 MG/1
TABLET, FILM COATED ORAL
Qty: 90 TABLET | Refills: 0 | Status: SHIPPED | OUTPATIENT
Start: 2017-05-18 | End: 2017-07-19 | Stop reason: SDUPTHER

## 2017-05-18 RX ORDER — GABAPENTIN 300 MG/1
CAPSULE ORAL
Qty: 360 CAPSULE | Refills: 0 | Status: SHIPPED | OUTPATIENT
Start: 2017-05-18 | End: 2017-10-24 | Stop reason: SDUPTHER

## 2017-07-19 ENCOUNTER — OFFICE VISIT (OUTPATIENT)
Dept: FAMILY MEDICINE CLINIC | Facility: CLINIC | Age: 52
End: 2017-07-19

## 2017-07-19 VITALS
BODY MASS INDEX: 33.96 KG/M2 | DIASTOLIC BLOOD PRESSURE: 78 MMHG | WEIGHT: 242.6 LBS | OXYGEN SATURATION: 93 % | RESPIRATION RATE: 16 BRPM | HEIGHT: 71 IN | SYSTOLIC BLOOD PRESSURE: 116 MMHG | HEART RATE: 74 BPM

## 2017-07-19 DIAGNOSIS — I10 ESSENTIAL (PRIMARY) HYPERTENSION: Primary | ICD-10-CM

## 2017-07-19 DIAGNOSIS — H10.13 ALLERGIC CONJUNCTIVITIS, BILATERAL: ICD-10-CM

## 2017-07-19 DIAGNOSIS — E78.00 PURE HYPERCHOLESTEROLEMIA: ICD-10-CM

## 2017-07-19 DIAGNOSIS — E11.9 DIABETES MELLITUS TYPE 2, NONINSULIN DEPENDENT (HCC): ICD-10-CM

## 2017-07-19 DIAGNOSIS — E53.8 VITAMIN B12 DEFICIENCY: ICD-10-CM

## 2017-07-19 DIAGNOSIS — R53.82 CHRONIC FATIGUE: ICD-10-CM

## 2017-07-19 DIAGNOSIS — G89.29 OTHER CHRONIC PAIN: ICD-10-CM

## 2017-07-19 PROBLEM — H10.10 ALLERGIC CONJUNCTIVITIS: Status: ACTIVE | Noted: 2017-07-19

## 2017-07-19 LAB
ALBUMIN SERPL-MCNC: 4.7 G/DL (ref 3.5–5.2)
ALBUMIN/GLOB SERPL: 1.6 G/DL
ALP SERPL-CCNC: 71 U/L (ref 39–117)
ALT SERPL-CCNC: 46 U/L (ref 1–41)
AST SERPL-CCNC: 34 U/L (ref 1–40)
BASOPHILS # BLD AUTO: 0.02 10*3/MM3 (ref 0–0.2)
BASOPHILS NFR BLD AUTO: 0.2 % (ref 0–1.5)
BILIRUB SERPL-MCNC: 1 MG/DL (ref 0.1–1.2)
BUN SERPL-MCNC: 19 MG/DL (ref 6–20)
BUN/CREAT SERPL: 21.3 (ref 7–25)
CALCIUM SERPL-MCNC: 11 MG/DL (ref 8.6–10.5)
CHLORIDE SERPL-SCNC: 101 MMOL/L (ref 98–107)
CHOLEST SERPL-MCNC: 159 MG/DL (ref 0–200)
CO2 SERPL-SCNC: 25.9 MMOL/L (ref 22–29)
CREAT SERPL-MCNC: 0.89 MG/DL (ref 0.76–1.27)
EOSINOPHIL # BLD AUTO: 0.33 10*3/MM3 (ref 0–0.7)
EOSINOPHIL NFR BLD AUTO: 2.9 % (ref 0.3–6.2)
ERYTHROCYTE [DISTWIDTH] IN BLOOD BY AUTOMATED COUNT: 14.1 % (ref 11.5–14.5)
GLOBULIN SER CALC-MCNC: 3 GM/DL
GLUCOSE SERPL-MCNC: 133 MG/DL (ref 65–99)
HBA1C MFR BLD: 6.8 % (ref 4.8–5.6)
HCT VFR BLD AUTO: 47.4 % (ref 40.4–52.2)
HDLC SERPL-MCNC: 40 MG/DL (ref 40–60)
HGB BLD-MCNC: 16.1 G/DL (ref 13.7–17.6)
IMM GRANULOCYTES # BLD: 0.03 10*3/MM3 (ref 0–0.03)
IMM GRANULOCYTES NFR BLD: 0.3 % (ref 0–0.5)
LDLC SERPL CALC-MCNC: 82 MG/DL (ref 0–100)
LYMPHOCYTES # BLD AUTO: 2.26 10*3/MM3 (ref 0.9–4.8)
LYMPHOCYTES NFR BLD AUTO: 20.2 % (ref 19.6–45.3)
MCH RBC QN AUTO: 31.1 PG (ref 27–32.7)
MCHC RBC AUTO-ENTMCNC: 34 G/DL (ref 32.6–36.4)
MCV RBC AUTO: 91.5 FL (ref 79.8–96.2)
MONOCYTES # BLD AUTO: 0.74 10*3/MM3 (ref 0.2–1.2)
MONOCYTES NFR BLD AUTO: 6.6 % (ref 5–12)
NEUTROPHILS # BLD AUTO: 7.83 10*3/MM3 (ref 1.9–8.1)
NEUTROPHILS NFR BLD AUTO: 69.8 % (ref 42.7–76)
PLATELET # BLD AUTO: 242 10*3/MM3 (ref 140–500)
POTASSIUM SERPL-SCNC: 4.6 MMOL/L (ref 3.5–5.2)
PROT SERPL-MCNC: 7.7 G/DL (ref 6–8.5)
RBC # BLD AUTO: 5.18 10*6/MM3 (ref 4.6–6)
SODIUM SERPL-SCNC: 142 MMOL/L (ref 136–145)
T4 FREE SERPL-MCNC: 1.15 NG/DL (ref 0.93–1.7)
TRIGL SERPL-MCNC: 184 MG/DL (ref 0–150)
TSH SERPL DL<=0.005 MIU/L-ACNC: 3.34 MIU/ML (ref 0.27–4.2)
VIT B12 SERPL-MCNC: >2000 PG/ML (ref 211–946)
VLDLC SERPL CALC-MCNC: 36.8 MG/DL (ref 5–40)
WBC # BLD AUTO: 11.21 10*3/MM3 (ref 4.5–10.7)

## 2017-07-19 PROCEDURE — 99214 OFFICE O/P EST MOD 30 MIN: CPT | Performed by: INTERNAL MEDICINE

## 2017-07-19 RX ORDER — ATORVASTATIN CALCIUM 20 MG/1
20 TABLET, FILM COATED ORAL DAILY
Qty: 30 TABLET | Refills: 5 | Status: SHIPPED | OUTPATIENT
Start: 2017-07-19 | End: 2018-01-29 | Stop reason: SDUPTHER

## 2017-07-19 RX ORDER — TAMSULOSIN HYDROCHLORIDE 0.4 MG/1
1 CAPSULE ORAL DAILY
Qty: 30 CAPSULE | Refills: 5 | Status: SHIPPED | OUTPATIENT
Start: 2017-07-19 | End: 2018-01-29 | Stop reason: SDUPTHER

## 2017-07-19 RX ORDER — CLOBETASOL PROPIONATE 0.5 MG/G
CREAM TOPICAL 2 TIMES DAILY PRN
Qty: 45 G | Refills: 0 | Status: SHIPPED | OUTPATIENT
Start: 2017-07-19 | End: 2019-04-16

## 2017-07-19 RX ORDER — AMLODIPINE BESYLATE 5 MG/1
5 TABLET ORAL DAILY
Qty: 30 TABLET | Refills: 5 | Status: SHIPPED | OUTPATIENT
Start: 2017-07-19 | End: 2018-01-29 | Stop reason: SDUPTHER

## 2017-07-19 RX ORDER — OLOPATADINE HYDROCHLORIDE 1 MG/ML
1 SOLUTION/ DROPS OPHTHALMIC 2 TIMES DAILY PRN
Qty: 5 ML | Refills: 5 | Status: SHIPPED | OUTPATIENT
Start: 2017-07-19 | End: 2017-08-28

## 2017-07-19 RX ORDER — TIZANIDINE HYDROCHLORIDE 4 MG/1
4 CAPSULE, GELATIN COATED ORAL 3 TIMES DAILY
Qty: 90 CAPSULE | Refills: 4 | Status: SHIPPED | OUTPATIENT
Start: 2017-07-19 | End: 2018-04-13

## 2017-07-19 NOTE — PROGRESS NOTES
"Subjective   Patient ID: Flaco Miles is a 51 y.o. male presents with   Chief Complaint   Patient presents with   • Hypertension     f/u   • Med Refill     on all meds       HPI - This patient has a history of hypertension type II non-insulin-dependent diabetes, vitamin B12 deficiency hypercholesterolemia chronic fatigue and he complains of itchy eyes suggestive of allergic conjunctivitis.  He has fatigue that's likely cause from sleep apnea he cannot tolerate a CPAP.  I offered to send him back to his sleep specialist he declines.    Assessment plan    Hypertension continue amlodipine, controlled    Hyperlipidemia Lipitor 20 fasting lipid profile CMP    Fatigue fatigue labs    Vitamin B12 deficiency check a vitamin B12 level    Diabetes type 2 metformin and lifestyle modification get an A1c and a CMP    Rash clobetasol when necessary    Allergies   Allergen Reactions   • Fexofenadine-Pseudoephed Er        The following portions of the patient's history were reviewed and updated as appropriate: allergies, current medications, past family history, past medical history, past social history, past surgical history and problem list.      Review of Systems   Constitutional: Positive for fatigue.   HENT: Negative.    Eyes: Negative.    Respiratory: Negative.    Cardiovascular: Negative.    Gastrointestinal: Negative.    Endocrine: Negative.    Genitourinary: Negative.    Musculoskeletal: Positive for arthralgias, back pain and gait problem.   Skin: Positive for rash.   Allergic/Immunologic: Negative.    Hematological: Negative.    Psychiatric/Behavioral: Negative.        Objective     Vitals:    07/19/17 1104   BP: 116/78   Pulse: 74   Resp: 16   SpO2: 93%   Weight: 242 lb 9.6 oz (110 kg)   Height: 70.5\" (179.1 cm)         Physical Exam   Constitutional: He is oriented to person, place, and time. He appears well-developed and well-nourished. No distress.   HENT:   Head: Normocephalic and atraumatic.   Eyes: " Conjunctivae and EOM are normal. Pupils are equal, round, and reactive to light. Right eye exhibits no discharge. Left eye exhibits no discharge. No scleral icterus.   Neck: Normal range of motion. Neck supple. No tracheal deviation present. No thyromegaly present.   Cardiovascular: Normal rate, regular rhythm, normal heart sounds and normal pulses.  Exam reveals no gallop.    No murmur heard.  Pulmonary/Chest: Effort normal and breath sounds normal. No respiratory distress. He has no wheezes. He has no rales.   Musculoskeletal: Normal range of motion.   Neurological: He is alert and oriented to person, place, and time.   Skin: Skin is warm. No rash noted. No erythema. No pallor.   Psychiatric: He has a normal mood and affect. His behavior is normal. Thought content normal.   Nursing note and vitals reviewed.        Flaco was seen today for hypertension and med refill.    Diagnoses and all orders for this visit:    Essential (primary) hypertension  -     Lipid Panel  -     Hemoglobin A1c  -     Comprehensive Metabolic Panel  -     Vitamin B12  -     CBC & Differential  -     TSH+Free T4    Pure hypercholesterolemia  -     Lipid Panel  -     Hemoglobin A1c  -     Comprehensive Metabolic Panel  -     Vitamin B12  -     CBC & Differential  -     TSH+Free T4    Vitamin B12 deficiency  -     Lipid Panel  -     Hemoglobin A1c  -     Comprehensive Metabolic Panel  -     Vitamin B12  -     CBC & Differential  -     TSH+Free T4    Diabetes mellitus type 2, noninsulin dependent  -     Lipid Panel  -     Hemoglobin A1c  -     Comprehensive Metabolic Panel  -     Vitamin B12  -     CBC & Differential  -     TSH+Free T4    Other chronic pain  -     Lipid Panel  -     Hemoglobin A1c  -     Comprehensive Metabolic Panel  -     Vitamin B12  -     CBC & Differential  -     TSH+Free T4    Chronic fatigue  -     Lipid Panel  -     Hemoglobin A1c  -     Comprehensive Metabolic Panel  -     Vitamin B12  -     CBC & Differential  -      TSH+Free T4    Allergic conjunctivitis, bilateral    Other orders  -     clobetasol (TEMOVATE) 0.05 % cream; Apply  topically 2 (Two) Times a Day As Needed (itching).  -     olopatadine (PATANOL) 0.1 % ophthalmic solution; Administer 1 drop to both eyes 2 (Two) Times a Day As Needed for Allergies.        Call or return to clinic prn if these symptoms worsen or fail to improve as anticipated.

## 2017-08-28 ENCOUNTER — OFFICE VISIT (OUTPATIENT)
Dept: FAMILY MEDICINE CLINIC | Facility: CLINIC | Age: 52
End: 2017-08-28

## 2017-08-28 ENCOUNTER — HOSPITAL ENCOUNTER (OUTPATIENT)
Dept: GENERAL RADIOLOGY | Facility: HOSPITAL | Age: 52
Discharge: HOME | End: 2017-08-28
Admitting: INTERNAL MEDICINE

## 2017-08-28 VITALS
WEIGHT: 244.1 LBS | RESPIRATION RATE: 16 BRPM | HEART RATE: 66 BPM | HEIGHT: 71 IN | OXYGEN SATURATION: 94 % | BODY MASS INDEX: 34.17 KG/M2 | SYSTOLIC BLOOD PRESSURE: 130 MMHG | TEMPERATURE: 98.2 F | DIASTOLIC BLOOD PRESSURE: 70 MMHG

## 2017-08-28 DIAGNOSIS — M79.644 THUMB PAIN, RIGHT: Primary | ICD-10-CM

## 2017-08-28 DIAGNOSIS — G89.29 OTHER CHRONIC PAIN: ICD-10-CM

## 2017-08-28 PROBLEM — M79.646 THUMB PAIN: Status: ACTIVE | Noted: 2017-08-28

## 2017-08-28 PROCEDURE — 73140 X-RAY EXAM OF FINGER(S): CPT

## 2017-08-28 PROCEDURE — 99213 OFFICE O/P EST LOW 20 MIN: CPT | Performed by: INTERNAL MEDICINE

## 2017-08-28 RX ORDER — OXYCODONE AND ACETAMINOPHEN 10; 325 MG/1; MG/1
1 TABLET ORAL EVERY 8 HOURS PRN
Qty: 90 TABLET | Refills: 0 | Status: SHIPPED | OUTPATIENT
Start: 2017-08-28 | End: 2017-09-29 | Stop reason: SDUPTHER

## 2017-08-28 NOTE — PROGRESS NOTES
"Subjective   Patient ID: Flaco Miles is a 52 y.o. male presents with   Chief Complaint   Patient presents with   • Follow-up     on eye drops and med refills- he needs a refill for a month on his oxycodone       HPI - This patient injured his thumb about 5 weeks ago.  It still hurting he wants to get an x-ray.  Also he has severe arthritis all over his body's had multiple surgeries orthopedic and neurosurgical of his spine.  Currently he does have an appointment with pain management but has a few weeks to get in first he was hoping I could refill his oxycodone.  Nonsteroidals alone do not help.  This medicine helps with his quality of life he has not burst effects he is adherent regimen    Assessment plan    Thumb pain x-ray of the right thumb    Osteoarthritis and chronic pain syndrome-oxycodone 10/325, 90 no refills [place] reports obtaining narcotic agreement signed.    Allergies   Allergen Reactions   • Fexofenadine-Pseudoephed Er        The following portions of the patient's history were reviewed and updated as appropriate: allergies, current medications, past family history, past medical history, past social history, past surgical history and problem list.      Review of Systems   Constitutional: Negative.    Respiratory: Negative.    Cardiovascular: Negative.    Musculoskeletal: Positive for arthralgias and gait problem.       Objective     Vitals:    08/28/17 1326   BP: 130/70   Pulse: 66   Resp: 16   Temp: 98.2 °F (36.8 °C)   TempSrc: Oral   SpO2: 94%   Weight: 244 lb 1.6 oz (111 kg)   Height: 70.5\" (179.1 cm)         Physical Exam   Constitutional: He is oriented to person, place, and time. He appears well-developed and well-nourished.   Musculoskeletal: He exhibits tenderness.   Thumb is tender to palpation the DIP.  He has stigmata osteoarthritis all over.   Neurological: He is alert and oriented to person, place, and time.   Psychiatric: He has a normal mood and affect. His behavior is normal. "   Nursing note and vitals reviewed.        Flaco was seen today for follow-up.    Diagnoses and all orders for this visit:    Thumb pain, right  -     XR finger 2+ vw right    Other chronic pain    Other orders  -     oxyCODONE-acetaminophen (PERCOCET)  MG per tablet; Take 1 tablet by mouth Every 8 (Eight) Hours As Needed for Moderate Pain .        Call or return to clinic prn if these symptoms worsen or fail to improve as anticipated.

## 2017-08-29 ENCOUNTER — TELEPHONE (OUTPATIENT)
Dept: FAMILY MEDICINE CLINIC | Facility: CLINIC | Age: 52
End: 2017-08-29

## 2017-08-29 DIAGNOSIS — M79.646 PAIN OF FINGER, UNSPECIFIED LATERALITY: Primary | ICD-10-CM

## 2017-08-29 NOTE — TELEPHONE ENCOUNTER
----- Message from Hayden Morgan MD sent at 8/29/2017  9:45 AM EDT -----  Please inform the patient that there are some bone fragments where the pain is that likely R result from his injury.  If he would like for me to send him over to the hand surgeon to see if he could do something about this I'll be glad to do it.  Just let me know      Pt informed and said ok to sending him to the hand specialist

## 2017-09-29 ENCOUNTER — OFFICE VISIT (OUTPATIENT)
Dept: FAMILY MEDICINE CLINIC | Facility: CLINIC | Age: 52
End: 2017-09-29

## 2017-09-29 VITALS
HEIGHT: 71 IN | DIASTOLIC BLOOD PRESSURE: 80 MMHG | OXYGEN SATURATION: 96 % | BODY MASS INDEX: 34.16 KG/M2 | SYSTOLIC BLOOD PRESSURE: 144 MMHG | HEART RATE: 61 BPM | TEMPERATURE: 98 F | WEIGHT: 244 LBS | RESPIRATION RATE: 16 BRPM

## 2017-09-29 DIAGNOSIS — G89.4 CHRONIC PAIN SYNDROME: Primary | ICD-10-CM

## 2017-09-29 PROCEDURE — 99213 OFFICE O/P EST LOW 20 MIN: CPT | Performed by: INTERNAL MEDICINE

## 2017-09-29 RX ORDER — OXYCODONE AND ACETAMINOPHEN 10; 325 MG/1; MG/1
1 TABLET ORAL EVERY 8 HOURS PRN
Qty: 90 TABLET | Refills: 0 | Status: SHIPPED | OUTPATIENT
Start: 2017-09-29 | End: 2017-10-30 | Stop reason: SDUPTHER

## 2017-09-29 RX ORDER — LISINOPRIL 10 MG/1
TABLET ORAL
Qty: 90 TABLET | Refills: 1 | Status: SHIPPED | OUTPATIENT
Start: 2017-09-29 | End: 2018-01-29 | Stop reason: SDUPTHER

## 2017-09-29 RX ORDER — ALLOPURINOL 300 MG/1
TABLET ORAL
COMMUNITY
Start: 2017-08-28 | End: 2017-11-27 | Stop reason: SDUPTHER

## 2017-09-29 NOTE — PROGRESS NOTES
"Subjective   Patient ID: Flaco Miles is a 52 y.o. 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  He is attempting to get into pain management and currently I am refilling his Percocet tens.  This medicine helps with his quality of life he has no adverse effect he's adherent to regimen.  Nonsteroidals alone do not work he also takes Neurontin this medicine also helps his quality of life.    Assessment plan    Chronic pain syndrome-oxycodone 10/325, 90 no refills [place] reports obtained and narcotic agreement was signed.    Allergies   Allergen Reactions   • Fexofenadine-Pseudoephed Er        The following portions of the patient's history were reviewed and updated as appropriate: allergies, current medications, past family history, past medical history, past social history, past surgical history and problem list.      Review of Systems   Constitutional: Negative.    Musculoskeletal: Positive for arthralgias, back pain and myalgias.   Neurological: Negative.    Psychiatric/Behavioral: Negative.        Objective     Vitals:    09/29/17 1024   BP: 144/80   Pulse: 61   Resp: 16   Temp: 98 °F (36.7 °C)   TempSrc: Oral   SpO2: 96%   Weight: 244 lb (111 kg)   Height: 70.5\" (179.1 cm)         Physical Exam   Constitutional: He is oriented to person, place, and time. He appears well-developed and well-nourished.   Musculoskeletal: He exhibits deformity.   Antalgic gait uses a cane   Neurological: He is alert and oriented to person, place, and time.   Psychiatric: He has a normal mood and affect. His behavior is normal.   Nursing note and vitals reviewed.        Flaco was seen today for follow-up.    Diagnoses and all orders for this visit:    Chronic pain syndrome    Other orders  -     oxyCODONE-acetaminophen (PERCOCET)  MG per " tablet; Take 1 tablet by mouth Every 8 (Eight) Hours As Needed for Moderate Pain .        Call or return to clinic prn if these symptoms worsen or fail to improve as anticipated.

## 2017-10-25 RX ORDER — GABAPENTIN 300 MG/1
CAPSULE ORAL
Qty: 120 CAPSULE | Refills: 2 | Status: SHIPPED | OUTPATIENT
Start: 2017-10-25 | End: 2018-01-29 | Stop reason: SDUPTHER

## 2017-10-30 ENCOUNTER — OFFICE VISIT (OUTPATIENT)
Dept: FAMILY MEDICINE CLINIC | Facility: CLINIC | Age: 52
End: 2017-10-30

## 2017-10-30 VITALS
RESPIRATION RATE: 16 BRPM | HEART RATE: 92 BPM | DIASTOLIC BLOOD PRESSURE: 98 MMHG | WEIGHT: 246.2 LBS | OXYGEN SATURATION: 94 % | HEIGHT: 71 IN | BODY MASS INDEX: 34.47 KG/M2 | SYSTOLIC BLOOD PRESSURE: 158 MMHG

## 2017-10-30 DIAGNOSIS — M25.531 PAIN OF BOTH WRIST JOINTS: Primary | ICD-10-CM

## 2017-10-30 DIAGNOSIS — M25.532 PAIN OF BOTH WRIST JOINTS: Primary | ICD-10-CM

## 2017-10-30 DIAGNOSIS — G89.4 CHRONIC PAIN SYNDROME: ICD-10-CM

## 2017-10-30 DIAGNOSIS — M79.641 PAIN OF RIGHT HAND: ICD-10-CM

## 2017-10-30 PROCEDURE — 99213 OFFICE O/P EST LOW 20 MIN: CPT | Performed by: INTERNAL MEDICINE

## 2017-10-30 RX ORDER — OXYCODONE AND ACETAMINOPHEN 10; 325 MG/1; MG/1
1 TABLET ORAL EVERY 8 HOURS PRN
Qty: 90 TABLET | Refills: 0 | Status: SHIPPED | OUTPATIENT
Start: 2017-10-30 | End: 2017-11-27 | Stop reason: SDUPTHER

## 2017-10-30 NOTE — PROGRESS NOTES
"Subjective   Patient ID: Flaco Miles is a 52 y.o. male presents with   Chief Complaint   Patient presents with   • Med Refill     on pain meds       HPI - This patient presents today for treatment of chronic pain syndrome he's had multiple orthopedic surgeries and including knee surgeries back surgeries pain in hands and wrists.  He is in between pain management currently and needs me to refill his oxycodone this medicine helps with his quality of life he has no adverse effects he is adherent regimen.    Assessment plan    Chronic pain syndrome-oxycodone 10, 90 no refills [place] reports obtaining narcotic agreement signed.    Allergies   Allergen Reactions   • Fexofenadine-Pseudoephed Er        The following portions of the patient's history were reviewed and updated as appropriate: allergies, current medications, past family history, past medical history, past social history, past surgical history and problem list.      Review of Systems   Constitutional: Negative.    Musculoskeletal: Positive for arthralgias, back pain and gait problem.   Psychiatric/Behavioral: Negative.        Objective     Vitals:    10/30/17 1358   BP: 158/98   Pulse: 92   Resp: 16   SpO2: 94%   Weight: 246 lb 3.2 oz (112 kg)   Height: 70.5\" (179.1 cm)         Physical Exam   Constitutional: He is oriented to person, place, and time. He appears well-developed and well-nourished.   Neurological: He is alert and oriented to person, place, and time.   Psychiatric: He has a normal mood and affect. His behavior is normal.   Nursing note and vitals reviewed.        Flaco was seen today for med refill.    Diagnoses and all orders for this visit:    Pain of both wrist joints    Pain of right hand    Chronic pain syndrome    Other orders  -     oxyCODONE-acetaminophen (PERCOCET)  MG per tablet; Take 1 tablet by mouth Every 8 (Eight) Hours As Needed for Moderate Pain .        Call or return to clinic prn if these symptoms worsen or fail to " improve as anticipated.

## 2017-11-27 ENCOUNTER — OFFICE VISIT (OUTPATIENT)
Dept: FAMILY MEDICINE CLINIC | Facility: CLINIC | Age: 52
End: 2017-11-27

## 2017-11-27 VITALS
SYSTOLIC BLOOD PRESSURE: 120 MMHG | OXYGEN SATURATION: 95 % | BODY MASS INDEX: 33.96 KG/M2 | HEIGHT: 71 IN | HEART RATE: 70 BPM | DIASTOLIC BLOOD PRESSURE: 70 MMHG | TEMPERATURE: 98 F | WEIGHT: 242.6 LBS | RESPIRATION RATE: 16 BRPM

## 2017-11-27 DIAGNOSIS — Z79.899 HIGH RISK MEDICATION USE: Primary | ICD-10-CM

## 2017-11-27 DIAGNOSIS — G89.4 CHRONIC PAIN SYNDROME: Primary | ICD-10-CM

## 2017-11-27 PROCEDURE — 99213 OFFICE O/P EST LOW 20 MIN: CPT | Performed by: INTERNAL MEDICINE

## 2017-11-27 RX ORDER — ALLOPURINOL 300 MG/1
300 TABLET ORAL DAILY
Qty: 90 TABLET | Refills: 3 | Status: SHIPPED | OUTPATIENT
Start: 2017-11-27 | End: 2017-11-27 | Stop reason: SDUPTHER

## 2017-11-27 RX ORDER — ALLOPURINOL 300 MG/1
300 TABLET ORAL DAILY
Qty: 90 TABLET | Refills: 3 | Status: SHIPPED | OUTPATIENT
Start: 2017-11-27 | End: 2018-01-29 | Stop reason: SDUPTHER

## 2017-11-27 RX ORDER — OXYCODONE AND ACETAMINOPHEN 10; 325 MG/1; MG/1
1 TABLET ORAL EVERY 8 HOURS PRN
Qty: 90 TABLET | Refills: 0 | Status: SHIPPED | OUTPATIENT
Start: 2017-11-27 | End: 2017-12-22 | Stop reason: SDUPTHER

## 2017-11-27 NOTE — PROGRESS NOTES
"Subjective   Patient ID: Flaco Miles is a 52 y.o. male presents with   Chief Complaint   Patient presents with   • Hypertension     f/u   • Med Refill     on pain medication       HPI - This patient presents today for follow-up for chronic pain syndrome.  He has had multiple orthopedic surgeries he's had 7 knees surgeries on the left 2 knee surgeries on the right left ankle surgery right elbow surgery 3 back surgeries with hardware 3 cervical spine surgeries with hardware.  He's had 2 right shoulder surgeries and 2 left shoulder surgeries.  Pain is severe in intensity and not controllable with nonsteroidal anti-inflammatories.  He is on Neurontin and oxycodone these medicines help him with his quality of life he has no adverse effects and he's adherent to the regimen.    Assessment plan    Chronic pain syndrome-Percocet 10 every 8 when necessary, 90 [place] report was obtained narcotic agreement was signed and urine drug screen was obtained.  Continue gabapentin as well.    Allergies   Allergen Reactions   • Fexofenadine-Pseudoephed Er        The following portions of the patient's history were reviewed and updated as appropriate: allergies, current medications, past family history, past medical history, past social history, past surgical history and problem list.      Review of Systems   Constitutional: Negative.    Respiratory: Negative.    Cardiovascular: Negative.    Psychiatric/Behavioral: Negative.        Objective     Vitals:    11/27/17 1024   BP: 120/70   Pulse: 70   Resp: 16   Temp: 98 °F (36.7 °C)   TempSrc: Oral   SpO2: 95%   Weight: 242 lb 9.6 oz (110 kg)   Height: 70.5\" (179.1 cm)         Physical Exam   Constitutional: He is oriented to person, place, and time. He appears well-developed and well-nourished.   Cardiovascular: Normal rate, regular rhythm and normal heart sounds.    Neurological: He is alert and oriented to person, place, and time.   Psychiatric: He has a normal mood and affect. His " behavior is normal.   Nursing note and vitals reviewed.        Flaco was seen today for hypertension and med refill.    Diagnoses and all orders for this visit:    Chronic pain syndrome  -     Urine Drug Screen - Urine, Clean Catch    Other orders  -     oxyCODONE-acetaminophen (PERCOCET)  MG per tablet; Take 1 tablet by mouth Every 8 (Eight) Hours As Needed for Moderate Pain .  -     metFORMIN (GLUCOPHAGE) 500 MG tablet; Take 1 tablet by mouth 2 (Two) Times a Day With Meals.  -     allopurinol (ZYLOPRIM) 300 MG tablet; Take 1 tablet by mouth Daily.        Call or return to clinic prn if these symptoms worsen or fail to improve as anticipated.

## 2017-11-30 LAB — DRUGS UR: NORMAL

## 2017-12-22 ENCOUNTER — OFFICE VISIT (OUTPATIENT)
Dept: FAMILY MEDICINE CLINIC | Facility: CLINIC | Age: 52
End: 2017-12-22

## 2017-12-22 VITALS
HEART RATE: 64 BPM | TEMPERATURE: 98 F | OXYGEN SATURATION: 98 % | WEIGHT: 240 LBS | BODY MASS INDEX: 47.12 KG/M2 | HEIGHT: 60 IN | RESPIRATION RATE: 16 BRPM | SYSTOLIC BLOOD PRESSURE: 140 MMHG | DIASTOLIC BLOOD PRESSURE: 82 MMHG

## 2017-12-22 DIAGNOSIS — Z12.5 PROSTATE CANCER SCREENING: ICD-10-CM

## 2017-12-22 DIAGNOSIS — E78.00 PURE HYPERCHOLESTEROLEMIA: ICD-10-CM

## 2017-12-22 DIAGNOSIS — E53.8 VITAMIN B12 DEFICIENCY: ICD-10-CM

## 2017-12-22 DIAGNOSIS — G89.4 CHRONIC PAIN SYNDROME: ICD-10-CM

## 2017-12-22 DIAGNOSIS — E11.9 DIABETES MELLITUS TYPE 2, NONINSULIN DEPENDENT (HCC): ICD-10-CM

## 2017-12-22 DIAGNOSIS — I10 ESSENTIAL (PRIMARY) HYPERTENSION: Primary | ICD-10-CM

## 2017-12-22 PROCEDURE — 99214 OFFICE O/P EST MOD 30 MIN: CPT | Performed by: INTERNAL MEDICINE

## 2017-12-22 RX ORDER — OXYCODONE AND ACETAMINOPHEN 10; 325 MG/1; MG/1
1 TABLET ORAL EVERY 8 HOURS PRN
Qty: 90 TABLET | Refills: 0 | Status: SHIPPED | OUTPATIENT
Start: 2017-12-22 | End: 2018-01-29 | Stop reason: SDUPTHER

## 2017-12-22 NOTE — PROGRESS NOTES
"Subjective   Patient ID: Flaco Miles is a 52 y.o. male presents with   Chief Complaint   Patient presents with   • Med Refill     on pain medication   • Follow-up     on urine test        HPI - This patient has chronic pain he's had multiple orthopedic and spinal surgeries.  He is disabled and uses a cane.  Nonsteroidals don't help him.  He takes gabapentin and oxycodone.  These medicines help with his quality of life he has no adverse effects he is adherent to the regimen.    Assessment plan    Chronic pain syndrome-oxycodone 10/325, 90, no refills [place] reports obtaining narcotic agreement was signed continue gabapentin as well.      Hypertension-we'll get labs continue lisinopril and amlodipine, controlled    Vitamin B12 deficiency we'll check a vitamin B12 level    Type 2 diabetes controlled with metformin will get an A1c and a CMP    Hypercholesterolemia-continue Lipitor 20, fasting lipid profile and CMP        Allergies   Allergen Reactions   • Fexofenadine-Pseudoephed Er        The following portions of the patient's history were reviewed and updated as appropriate: allergies, current medications, past family history, past medical history, past social history, past surgical history and problem list.      Review of Systems   Constitutional: Positive for fatigue.   HENT: Negative.    Eyes: Negative.    Respiratory: Negative.    Cardiovascular: Negative.    Gastrointestinal: Negative.    Endocrine: Negative.    Genitourinary: Negative.    Musculoskeletal: Positive for arthralgias, back pain, gait problem, joint swelling and myalgias.   Skin: Negative.    Allergic/Immunologic: Negative.    Hematological: Negative.    Psychiatric/Behavioral: Negative.        Objective     Vitals:    12/22/17 1052   BP: 140/82   Pulse: 64   Resp: 16   Temp: 98 °F (36.7 °C)   TempSrc: Oral   SpO2: 98%   Weight: 109 kg (240 lb)   Height: 70.5 cm (27.76\")         Physical Exam   Constitutional: He is oriented to person, place, " and time. He appears well-developed and well-nourished.   HENT:   Head: Normocephalic and atraumatic.   Cardiovascular: Normal rate, regular rhythm and normal heart sounds.    Pulmonary/Chest: Effort normal and breath sounds normal.   Neurological: He is alert and oriented to person, place, and time.   Uses a cane to ambulate   Psychiatric: He has a normal mood and affect. His behavior is normal.   Nursing note and vitals reviewed.        Flaco was seen today for med refill and follow-up.    Diagnoses and all orders for this visit:    Essential (primary) hypertension  -     PSA  -     Lipid Panel  -     Hemoglobin A1c  -     Microalbumin / Creatinine Urine Ratio - Urine, Clean Catch  -     Comprehensive Metabolic Panel  -     TSH+Free T4    Pure hypercholesterolemia  -     PSA  -     Lipid Panel  -     Hemoglobin A1c  -     Microalbumin / Creatinine Urine Ratio - Urine, Clean Catch  -     Comprehensive Metabolic Panel  -     TSH+Free T4    Vitamin B12 deficiency  -     PSA  -     Lipid Panel  -     Hemoglobin A1c  -     Microalbumin / Creatinine Urine Ratio - Urine, Clean Catch  -     Comprehensive Metabolic Panel  -     TSH+Free T4    Diabetes mellitus type 2, noninsulin dependent  -     PSA  -     Lipid Panel  -     Hemoglobin A1c  -     Microalbumin / Creatinine Urine Ratio - Urine, Clean Catch  -     Comprehensive Metabolic Panel  -     TSH+Free T4    Prostate cancer screening  -     PSA  -     Lipid Panel  -     Hemoglobin A1c  -     Microalbumin / Creatinine Urine Ratio - Urine, Clean Catch  -     Comprehensive Metabolic Panel  -     TSH+Free T4    Chronic pain syndrome    Other orders  -     oxyCODONE-acetaminophen (PERCOCET)  MG per tablet; Take 1 tablet by mouth Every 8 (Eight) Hours As Needed for Moderate Pain .        Call or return to clinic prn if these symptoms worsen or fail to improve as anticipated.

## 2018-01-25 LAB
ALBUMIN SERPL-MCNC: 4.6 G/DL (ref 3.5–5.2)
ALBUMIN/CREAT UR: 14.7 MG/G CREAT (ref 0–30)
ALBUMIN/GLOB SERPL: 1.8 G/DL
ALP SERPL-CCNC: 104 U/L (ref 39–117)
ALT SERPL-CCNC: 99 U/L (ref 1–41)
AST SERPL-CCNC: 28 U/L (ref 1–40)
BILIRUB SERPL-MCNC: 1.3 MG/DL (ref 0.1–1.2)
BUN SERPL-MCNC: 11 MG/DL (ref 6–20)
BUN/CREAT SERPL: 14.5 (ref 7–25)
CALCIUM SERPL-MCNC: 10 MG/DL (ref 8.6–10.5)
CHLORIDE SERPL-SCNC: 103 MMOL/L (ref 98–107)
CHOLEST SERPL-MCNC: 94 MG/DL (ref 0–200)
CO2 SERPL-SCNC: 26.4 MMOL/L (ref 22–29)
CREAT SERPL-MCNC: 0.76 MG/DL (ref 0.76–1.27)
CREAT UR-MCNC: 310.5 MG/DL
GFR SERPLBLD CREATININE-BSD FMLA CKD-EPI: 108 ML/MIN/1.73
GFR SERPLBLD CREATININE-BSD FMLA CKD-EPI: 131 ML/MIN/1.73
GLOBULIN SER CALC-MCNC: 2.6 GM/DL
GLUCOSE SERPL-MCNC: 124 MG/DL (ref 65–99)
HBA1C MFR BLD: 6.5 % (ref 4.8–5.6)
HDLC SERPL-MCNC: 27 MG/DL (ref 40–60)
LDLC SERPL CALC-MCNC: 48 MG/DL (ref 0–100)
MICROALBUMIN UR-MCNC: 45.6 UG/ML
POTASSIUM SERPL-SCNC: 4.8 MMOL/L (ref 3.5–5.2)
PROT SERPL-MCNC: 7.2 G/DL (ref 6–8.5)
PSA SERPL-MCNC: 3.21 NG/ML (ref 0–4)
SODIUM SERPL-SCNC: 143 MMOL/L (ref 136–145)
T4 FREE SERPL-MCNC: 1.16 NG/DL (ref 0.93–1.7)
TRIGL SERPL-MCNC: 97 MG/DL (ref 0–150)
TSH SERPL DL<=0.005 MIU/L-ACNC: 1.47 MIU/ML (ref 0.27–4.2)
VLDLC SERPL CALC-MCNC: 19.4 MG/DL (ref 5–40)

## 2018-01-29 ENCOUNTER — OFFICE VISIT (OUTPATIENT)
Dept: FAMILY MEDICINE CLINIC | Facility: CLINIC | Age: 53
End: 2018-01-29

## 2018-01-29 VITALS
DIASTOLIC BLOOD PRESSURE: 80 MMHG | WEIGHT: 233 LBS | HEIGHT: 71 IN | HEART RATE: 86 BPM | OXYGEN SATURATION: 96 % | SYSTOLIC BLOOD PRESSURE: 110 MMHG | BODY MASS INDEX: 32.62 KG/M2 | RESPIRATION RATE: 16 BRPM

## 2018-01-29 DIAGNOSIS — M25.561 CHRONIC PAIN OF BOTH KNEES: ICD-10-CM

## 2018-01-29 DIAGNOSIS — E78.00 PURE HYPERCHOLESTEROLEMIA: ICD-10-CM

## 2018-01-29 DIAGNOSIS — E11.9 DIABETES MELLITUS TYPE 2, NONINSULIN DEPENDENT (HCC): ICD-10-CM

## 2018-01-29 DIAGNOSIS — I10 ESSENTIAL (PRIMARY) HYPERTENSION: ICD-10-CM

## 2018-01-29 DIAGNOSIS — M25.562 CHRONIC PAIN OF BOTH KNEES: ICD-10-CM

## 2018-01-29 DIAGNOSIS — R74.8 ABNORMAL LIVER ENZYMES: Primary | ICD-10-CM

## 2018-01-29 DIAGNOSIS — G89.29 CHRONIC PAIN OF BOTH KNEES: ICD-10-CM

## 2018-01-29 PROCEDURE — 99214 OFFICE O/P EST MOD 30 MIN: CPT | Performed by: INTERNAL MEDICINE

## 2018-01-29 RX ORDER — GABAPENTIN 300 MG/1
300 CAPSULE ORAL 2 TIMES DAILY
Qty: 120 CAPSULE | Refills: 3 | Status: SHIPPED | OUTPATIENT
Start: 2018-01-29 | End: 2018-01-29 | Stop reason: SDUPTHER

## 2018-01-29 RX ORDER — GABAPENTIN 300 MG/1
300 CAPSULE ORAL 4 TIMES DAILY
Qty: 120 CAPSULE | Refills: 3 | OUTPATIENT
Start: 2018-01-29 | End: 2018-03-15 | Stop reason: SDUPTHER

## 2018-01-29 RX ORDER — OXYCODONE AND ACETAMINOPHEN 10; 325 MG/1; MG/1
1 TABLET ORAL EVERY 8 HOURS PRN
Qty: 90 TABLET | Refills: 0 | Status: SHIPPED | OUTPATIENT
Start: 2018-01-29 | End: 2018-02-27 | Stop reason: SDUPTHER

## 2018-01-29 RX ORDER — ATORVASTATIN CALCIUM 20 MG/1
20 TABLET, FILM COATED ORAL DAILY
Qty: 90 TABLET | Refills: 1 | Status: SHIPPED | OUTPATIENT
Start: 2018-01-29 | End: 2018-03-15 | Stop reason: SDUPTHER

## 2018-01-29 RX ORDER — LISINOPRIL 10 MG/1
10 TABLET ORAL DAILY
Qty: 90 TABLET | Refills: 1 | Status: SHIPPED | OUTPATIENT
Start: 2018-01-29 | End: 2018-03-15 | Stop reason: SDUPTHER

## 2018-01-29 RX ORDER — TAMSULOSIN HYDROCHLORIDE 0.4 MG/1
1 CAPSULE ORAL DAILY
Qty: 90 CAPSULE | Refills: 1 | Status: SHIPPED | OUTPATIENT
Start: 2018-01-29 | End: 2018-03-22 | Stop reason: SDUPTHER

## 2018-01-29 RX ORDER — ALLOPURINOL 300 MG/1
300 TABLET ORAL DAILY
Qty: 90 TABLET | Refills: 1 | Status: SHIPPED | OUTPATIENT
Start: 2018-01-29 | End: 2018-03-15 | Stop reason: SDUPTHER

## 2018-01-29 RX ORDER — AMLODIPINE BESYLATE 5 MG/1
5 TABLET ORAL DAILY
Qty: 90 TABLET | Refills: 1 | Status: SHIPPED | OUTPATIENT
Start: 2018-01-29 | End: 2018-03-15 | Stop reason: SDUPTHER

## 2018-01-29 NOTE — PROGRESS NOTES
"Subjective   Patient ID: Flaco Miles is a 52 y.o. male presents with   Chief Complaint   Patient presents with   • Diabetes     already had labs   • Med Refill       HPI - This patient presents today for follow-up for chronic pain in both knees and multiple arthritic problems he requests to see an orthopedist.  He also takes gabapentin and oxycodone.  These medicines help with his quality of life he has no adverse effects he is adherent regimen.  Blood pressures under control we reviewed his labs labs are under good control he has a slight elevation in liver enzymes which is new.    Assessment plan    Type 2 diabetes controlled on metformin    Hypercholesterolemia-controlled with Lipitor 20 but I'm going to have him hold it for 2 weeks come back and get another metabolic panel    Hypertension controlled with amlodipine and lisinopril    Chronic knee pain appointment with orthopedics, Percocet, #90 [place] for was obtained narcotic agreement was signed.     Abnormal liver enzymes also will check hepatitis panel and iron level.    Allergies   Allergen Reactions   • Fexofenadine-Pseudoephed Er        The following portions of the patient's history were reviewed and updated as appropriate: allergies, current medications, past family history, past medical history, past social history, past surgical history and problem list.      Review of Systems   Constitutional: Negative.    HENT: Negative.    Eyes: Negative.    Respiratory: Negative.    Cardiovascular: Negative.    Gastrointestinal: Negative.    Endocrine: Negative.    Genitourinary: Negative.    Musculoskeletal: Positive for arthralgias and gait problem.   Skin: Negative.    Allergic/Immunologic: Negative.    Hematological: Negative.    Psychiatric/Behavioral: Negative.        Objective     Vitals:    01/29/18 1048   BP: 110/80   Pulse: 86   Resp: 16   SpO2: 96%   Weight: 106 kg (233 lb)   Height: 179.1 cm (70.5\")         Physical Exam   Constitutional: He is " oriented to person, place, and time. He appears well-developed and well-nourished. No distress.   HENT:   Head: Normocephalic and atraumatic.   Eyes: Conjunctivae and EOM are normal. Pupils are equal, round, and reactive to light. Right eye exhibits no discharge. Left eye exhibits no discharge. No scleral icterus.   Neck: Normal range of motion. Neck supple. No tracheal deviation present. No thyromegaly present.   Cardiovascular: Normal rate, regular rhythm, normal heart sounds and normal pulses.  Exam reveals no gallop.    No murmur heard.  Pulmonary/Chest: Effort normal and breath sounds normal. No respiratory distress. He has no wheezes. He has no rales.   Musculoskeletal: Normal range of motion.   Neurological: He is alert and oriented to person, place, and time.   Skin: Skin is warm. No rash noted. No erythema. No pallor.   Psychiatric: He has a normal mood and affect. His behavior is normal. Judgment and thought content normal.   Nursing note and vitals reviewed.        Flaco was seen today for diabetes and med refill.    Diagnoses and all orders for this visit:    Abnormal liver enzymes  -     Comprehensive Metabolic Panel    Chronic pain of both knees  -     Ambulatory Referral to Orthopedic Surgery    Essential (primary) hypertension    Pure hypercholesterolemia    Diabetes mellitus type 2, noninsulin dependent    Other orders  -     oxyCODONE-acetaminophen (PERCOCET)  MG per tablet; Take 1 tablet by mouth Every 8 (Eight) Hours As Needed for Moderate Pain .  -     gabapentin (NEURONTIN) 300 MG capsule; Take 1 capsule by mouth 2 (Two) Times a Day.        Call or return to clinic prn if these symptoms worsen or fail to improve as anticipated.

## 2018-02-13 LAB
ALBUMIN SERPL-MCNC: 4.3 G/DL (ref 3.5–5.2)
ALBUMIN/GLOB SERPL: 1.4 G/DL
ALP SERPL-CCNC: 71 U/L (ref 39–117)
ALT SERPL-CCNC: 31 U/L (ref 1–41)
AST SERPL-CCNC: 23 U/L (ref 1–40)
BILIRUB SERPL-MCNC: 1.3 MG/DL (ref 0.1–1.2)
BUN SERPL-MCNC: 13 MG/DL (ref 6–20)
BUN/CREAT SERPL: 17.1 (ref 7–25)
CALCIUM SERPL-MCNC: 10 MG/DL (ref 8.6–10.5)
CHLORIDE SERPL-SCNC: 99 MMOL/L (ref 98–107)
CO2 SERPL-SCNC: 28.9 MMOL/L (ref 22–29)
CREAT SERPL-MCNC: 0.76 MG/DL (ref 0.76–1.27)
FERRITIN SERPL-MCNC: 97.82 NG/ML (ref 30–400)
GFR SERPLBLD CREATININE-BSD FMLA CKD-EPI: 108 ML/MIN/1.73
GFR SERPLBLD CREATININE-BSD FMLA CKD-EPI: 131 ML/MIN/1.73
GLOBULIN SER CALC-MCNC: 3.1 GM/DL
GLUCOSE SERPL-MCNC: 99 MG/DL (ref 65–99)
HAV IGM SERPL QL IA: NEGATIVE
HBV CORE IGM SERPL QL IA: NEGATIVE
HBV SURFACE AG SERPL QL IA: NEGATIVE
HCV AB S/CO SERPL IA: <0.1 S/CO RATIO (ref 0–0.9)
IRON SERPL-MCNC: 120 MCG/DL (ref 59–158)
POTASSIUM SERPL-SCNC: 4.6 MMOL/L (ref 3.5–5.2)
PROT SERPL-MCNC: 7.4 G/DL (ref 6–8.5)
SODIUM SERPL-SCNC: 140 MMOL/L (ref 136–145)

## 2018-02-27 ENCOUNTER — APPOINTMENT (OUTPATIENT)
Dept: PREADMISSION TESTING | Facility: HOSPITAL | Age: 53
End: 2018-02-27

## 2018-02-27 ENCOUNTER — HOSPITAL ENCOUNTER (OUTPATIENT)
Dept: GENERAL RADIOLOGY | Facility: HOSPITAL | Age: 53
Discharge: HOME | End: 2018-02-27
Admitting: ORTHOPAEDIC SURGERY

## 2018-02-27 ENCOUNTER — OFFICE VISIT (OUTPATIENT)
Dept: FAMILY MEDICINE CLINIC | Facility: CLINIC | Age: 53
End: 2018-02-27

## 2018-02-27 ENCOUNTER — HOSPITAL ENCOUNTER (OUTPATIENT)
Dept: GENERAL RADIOLOGY | Facility: HOSPITAL | Age: 53
Discharge: HOME | End: 2018-02-27

## 2018-02-27 VITALS
OXYGEN SATURATION: 94 % | HEART RATE: 69 BPM | SYSTOLIC BLOOD PRESSURE: 112 MMHG | WEIGHT: 241.4 LBS | BODY MASS INDEX: 33.8 KG/M2 | TEMPERATURE: 99.2 F | DIASTOLIC BLOOD PRESSURE: 64 MMHG | HEIGHT: 71 IN

## 2018-02-27 VITALS
BODY MASS INDEX: 33.6 KG/M2 | TEMPERATURE: 98.2 F | OXYGEN SATURATION: 97 % | HEIGHT: 71 IN | WEIGHT: 240 LBS | SYSTOLIC BLOOD PRESSURE: 125 MMHG | RESPIRATION RATE: 20 BRPM | DIASTOLIC BLOOD PRESSURE: 79 MMHG | HEART RATE: 61 BPM

## 2018-02-27 DIAGNOSIS — M25.562 CHRONIC PAIN OF BOTH KNEES: Primary | ICD-10-CM

## 2018-02-27 DIAGNOSIS — G89.29 CHRONIC PAIN OF BOTH KNEES: Primary | ICD-10-CM

## 2018-02-27 DIAGNOSIS — G89.4 CHRONIC PAIN SYNDROME: ICD-10-CM

## 2018-02-27 DIAGNOSIS — I10 ESSENTIAL (PRIMARY) HYPERTENSION: ICD-10-CM

## 2018-02-27 DIAGNOSIS — M25.561 CHRONIC PAIN OF BOTH KNEES: Primary | ICD-10-CM

## 2018-02-27 LAB
APTT PPP: 27.9 SECONDS (ref 22.7–35.4)
BACTERIA UR QL AUTO: ABNORMAL /HPF
BASOPHILS # BLD AUTO: 0.02 10*3/MM3 (ref 0–0.2)
BASOPHILS NFR BLD AUTO: 0.2 % (ref 0–1.5)
BILIRUB UR QL STRIP: NEGATIVE
CLARITY UR: CLEAR
COLOR UR: YELLOW
DEPRECATED RDW RBC AUTO: 46.1 FL (ref 37–54)
EOSINOPHIL # BLD AUTO: 0.23 10*3/MM3 (ref 0–0.7)
EOSINOPHIL NFR BLD AUTO: 2.1 % (ref 0.3–6.2)
ERYTHROCYTE [DISTWIDTH] IN BLOOD BY AUTOMATED COUNT: 14.2 % (ref 11.5–14.5)
GLUCOSE UR STRIP-MCNC: NEGATIVE MG/DL
HCT VFR BLD AUTO: 45.2 % (ref 40.4–52.2)
HGB BLD-MCNC: 15.3 G/DL (ref 13.7–17.6)
HGB UR QL STRIP.AUTO: NEGATIVE
HYALINE CASTS UR QL AUTO: ABNORMAL /LPF
IMM GRANULOCYTES # BLD: 0.03 10*3/MM3 (ref 0–0.03)
IMM GRANULOCYTES NFR BLD: 0.3 % (ref 0–0.5)
INR PPP: 0.96 (ref 0.9–1.1)
KETONES UR QL STRIP: NEGATIVE
LEUKOCYTE ESTERASE UR QL STRIP.AUTO: ABNORMAL
LYMPHOCYTES # BLD AUTO: 2.6 10*3/MM3 (ref 0.9–4.8)
LYMPHOCYTES NFR BLD AUTO: 23.5 % (ref 19.6–45.3)
MCH RBC QN AUTO: 30.2 PG (ref 27–32.7)
MCHC RBC AUTO-ENTMCNC: 33.8 G/DL (ref 32.6–36.4)
MCV RBC AUTO: 89.2 FL (ref 79.8–96.2)
MONOCYTES # BLD AUTO: 0.59 10*3/MM3 (ref 0.2–1.2)
MONOCYTES NFR BLD AUTO: 5.3 % (ref 5–12)
NEUTROPHILS # BLD AUTO: 7.6 10*3/MM3 (ref 1.9–8.1)
NEUTROPHILS NFR BLD AUTO: 68.6 % (ref 42.7–76)
NITRITE UR QL STRIP: NEGATIVE
PH UR STRIP.AUTO: 5.5 [PH] (ref 5–8)
PLATELET # BLD AUTO: 229 10*3/MM3 (ref 140–500)
PMV BLD AUTO: 9.8 FL (ref 6–12)
PROT UR QL STRIP: ABNORMAL
PROTHROMBIN TIME: 12.6 SECONDS (ref 11.7–14.2)
RBC # BLD AUTO: 5.07 10*6/MM3 (ref 4.6–6)
RBC # UR: ABNORMAL /HPF
REF LAB TEST METHOD: ABNORMAL
SP GR UR STRIP: 1.03 (ref 1–1.03)
SQUAMOUS #/AREA URNS HPF: ABNORMAL /HPF
UROBILINOGEN UR QL STRIP: ABNORMAL
WBC NRBC COR # BLD: 11.07 10*3/MM3 (ref 4.5–10.7)
WBC UR QL AUTO: ABNORMAL /HPF

## 2018-02-27 PROCEDURE — 93005 ELECTROCARDIOGRAM TRACING: CPT

## 2018-02-27 PROCEDURE — 87086 URINE CULTURE/COLONY COUNT: CPT | Performed by: ORTHOPAEDIC SURGERY

## 2018-02-27 PROCEDURE — 93010 ELECTROCARDIOGRAM REPORT: CPT | Performed by: INTERNAL MEDICINE

## 2018-02-27 PROCEDURE — 36415 COLL VENOUS BLD VENIPUNCTURE: CPT

## 2018-02-27 PROCEDURE — 99213 OFFICE O/P EST LOW 20 MIN: CPT | Performed by: INTERNAL MEDICINE

## 2018-02-27 PROCEDURE — 73560 X-RAY EXAM OF KNEE 1 OR 2: CPT

## 2018-02-27 PROCEDURE — 71046 X-RAY EXAM CHEST 2 VIEWS: CPT

## 2018-02-27 PROCEDURE — 81001 URINALYSIS AUTO W/SCOPE: CPT | Performed by: ORTHOPAEDIC SURGERY

## 2018-02-27 PROCEDURE — 85730 THROMBOPLASTIN TIME PARTIAL: CPT | Performed by: ORTHOPAEDIC SURGERY

## 2018-02-27 PROCEDURE — 85610 PROTHROMBIN TIME: CPT | Performed by: ORTHOPAEDIC SURGERY

## 2018-02-27 PROCEDURE — 85025 COMPLETE CBC W/AUTO DIFF WBC: CPT | Performed by: ORTHOPAEDIC SURGERY

## 2018-02-27 RX ORDER — OXYCODONE AND ACETAMINOPHEN 10; 325 MG/1; MG/1
1 TABLET ORAL EVERY 8 HOURS PRN
Qty: 90 TABLET | Refills: 0 | Status: SHIPPED | OUTPATIENT
Start: 2018-02-27 | End: 2018-03-22 | Stop reason: SDUPTHER

## 2018-02-27 RX ORDER — CHLORHEXIDINE GLUCONATE 500 MG/1
CLOTH TOPICAL
Status: ON HOLD | COMMUNITY
End: 2018-03-05

## 2018-02-27 ASSESSMENT — KOOS JR
KOOS JR SCORE: 34.174
KOOS JR SCORE: 21

## 2018-02-27 NOTE — PROGRESS NOTES
"Subjective   Patient ID: Flaco Miles is a 52 y.o. male presents with   Chief Complaint   Patient presents with   • Back Pain     med refill for oxycodone        HPI - This patient has chronic pain syndrome he's had multiple orthopedic surgeries and issues he's got another knee replacement surgery coming up next week.  He takes oxycodone this medicine helps with his quality of life he has no adverse effects he is adherent to the regimen.  He needs a refill today.    Assessment plan    Chronic pain syndrome-oxycodone 10/325, 90 no refills    Essential hypertension controlled on current regimen    Chronic knee pain patient to have knee replacement surgery next week.    Allergies   Allergen Reactions   • Fexofenadine-Pseudoephed Er        The following portions of the patient's history were reviewed and updated as appropriate: allergies, current medications, past family history, past medical history, past social history, past surgical history and problem list.      Review of Systems   Constitutional: Negative.    Respiratory: Negative.    Cardiovascular: Negative.    Musculoskeletal: Positive for arthralgias, back pain and neck pain.   Neurological: Negative.        Objective     Vitals:    02/27/18 1049   BP: 112/64   BP Location: Left arm   Patient Position: Sitting   Cuff Size: Large Adult   Pulse: 69   Temp: 99.2 °F (37.3 °C)   TempSrc: Oral   SpO2: 94%   Weight: 109 kg (241 lb 6.4 oz)   Height: 179.1 cm (70.51\")         Physical Exam   Constitutional: He is oriented to person, place, and time. He appears well-developed and well-nourished.   Pulmonary/Chest: Effort normal and breath sounds normal.   Musculoskeletal:   Antalgic gait walks with a cane   Neurological: He is alert and oriented to person, place, and time.   Psychiatric: He has a normal mood and affect. His behavior is normal.   Nursing note and vitals reviewed.        Flaco was seen today for back pain.    Diagnoses and all orders for this " visit:    Chronic pain of both knees    Chronic pain syndrome    Essential (primary) hypertension    Other orders  -     oxyCODONE-acetaminophen (PERCOCET)  MG per tablet; Take 1 tablet by mouth Every 8 (Eight) Hours As Needed for Moderate Pain .        Call or return to clinic prn if these symptoms worsen or fail to improve as anticipated.

## 2018-03-01 LAB — BACTERIA SPEC AEROBE CULT: NO GROWTH

## 2018-03-05 ENCOUNTER — HOSPITAL ENCOUNTER (INPATIENT)
Facility: HOSPITAL | Age: 53
LOS: 1 days | Discharge: HOME HEALTH | End: 2018-03-06
Attending: ORTHOPAEDIC SURGERY | Admitting: ORTHOPAEDIC SURGERY

## 2018-03-05 ENCOUNTER — ANESTHESIA EVENT (OUTPATIENT)
Dept: PERIOP | Facility: HOSPITAL | Age: 53
End: 2018-03-05

## 2018-03-05 ENCOUNTER — APPOINTMENT (OUTPATIENT)
Dept: GENERAL RADIOLOGY | Facility: HOSPITAL | Age: 53
End: 2018-03-05

## 2018-03-05 ENCOUNTER — ANESTHESIA (OUTPATIENT)
Dept: PERIOP | Facility: HOSPITAL | Age: 53
End: 2018-03-05

## 2018-03-05 DIAGNOSIS — IMO0001 UNCONTROLLED TYPE 2 DIABETES MELLITUS WITHOUT COMPLICATION, WITHOUT LONG-TERM CURRENT USE OF INSULIN: Primary | ICD-10-CM

## 2018-03-05 PROBLEM — M17.9 DJD (DEGENERATIVE JOINT DISEASE) OF KNEE: Status: ACTIVE | Noted: 2018-03-05

## 2018-03-05 LAB
GLUCOSE BLDC GLUCOMTR-MCNC: 118 MG/DL (ref 70–130)
GLUCOSE BLDC GLUCOMTR-MCNC: 254 MG/DL (ref 70–130)

## 2018-03-05 PROCEDURE — 82962 GLUCOSE BLOOD TEST: CPT

## 2018-03-05 PROCEDURE — 25010000003 CEFAZOLIN IN DEXTROSE 2-4 GM/100ML-% SOLUTION: Performed by: NURSE ANESTHETIST, CERTIFIED REGISTERED

## 2018-03-05 PROCEDURE — 25010000002 MIDAZOLAM PER 1 MG: Performed by: ANESTHESIOLOGY

## 2018-03-05 PROCEDURE — 25010000002 PROPOFOL 10 MG/ML EMULSION: Performed by: NURSE ANESTHETIST, CERTIFIED REGISTERED

## 2018-03-05 PROCEDURE — 25010000002 VANCOMYCIN 10 G RECONSTITUTED SOLUTION: Performed by: ORTHOPAEDIC SURGERY

## 2018-03-05 PROCEDURE — 25010000002 HYDROMORPHONE PER 4 MG: Performed by: NURSE ANESTHETIST, CERTIFIED REGISTERED

## 2018-03-05 PROCEDURE — C1776 JOINT DEVICE (IMPLANTABLE): HCPCS | Performed by: ORTHOPAEDIC SURGERY

## 2018-03-05 PROCEDURE — 0SPD04Z REMOVAL OF INTERNAL FIXATION DEVICE FROM LEFT KNEE JOINT, OPEN APPROACH: ICD-10-PCS | Performed by: ORTHOPAEDIC SURGERY

## 2018-03-05 PROCEDURE — 25010000003 CEFAZOLIN IN DEXTROSE 2-4 GM/100ML-% SOLUTION: Performed by: ORTHOPAEDIC SURGERY

## 2018-03-05 PROCEDURE — 25010000002 FENTANYL CITRATE (PF) 100 MCG/2ML SOLUTION: Performed by: ANESTHESIOLOGY

## 2018-03-05 PROCEDURE — 25010000002 DEXAMETHASONE PER 1 MG: Performed by: NURSE ANESTHETIST, CERTIFIED REGISTERED

## 2018-03-05 PROCEDURE — 73560 X-RAY EXAM OF KNEE 1 OR 2: CPT

## 2018-03-05 PROCEDURE — G0378 HOSPITAL OBSERVATION PER HR: HCPCS

## 2018-03-05 PROCEDURE — 25010000002 MIDAZOLAM PER 1 MG: Performed by: NURSE ANESTHETIST, CERTIFIED REGISTERED

## 2018-03-05 PROCEDURE — 25010000002 FENTANYL CITRATE (PF) 100 MCG/2ML SOLUTION: Performed by: NURSE ANESTHETIST, CERTIFIED REGISTERED

## 2018-03-05 PROCEDURE — 25010000002 ONDANSETRON PER 1 MG: Performed by: NURSE ANESTHETIST, CERTIFIED REGISTERED

## 2018-03-05 PROCEDURE — 0SRD0J9 REPLACEMENT OF LEFT KNEE JOINT WITH SYNTHETIC SUBSTITUTE, CEMENTED, OPEN APPROACH: ICD-10-PCS | Performed by: ORTHOPAEDIC SURGERY

## 2018-03-05 PROCEDURE — 25010000002 KETOROLAC TROMETHAMINE PER 15 MG: Performed by: ORTHOPAEDIC SURGERY

## 2018-03-05 PROCEDURE — C1713 ANCHOR/SCREW BN/BN,TIS/BN: HCPCS | Performed by: ORTHOPAEDIC SURGERY

## 2018-03-05 DEVICE — IMPLANTABLE DEVICE: Type: IMPLANTABLE DEVICE | Site: KNEE | Status: FUNCTIONAL

## 2018-03-05 DEVICE — CMT BONE PALACOS 120001: Type: IMPLANTABLE DEVICE | Site: KNEE | Status: FUNCTIONAL

## 2018-03-05 DEVICE — JOURNEY II BCS FEMORAL OXINIUM                                    LEFT SIZE 6
Type: IMPLANTABLE DEVICE | Site: KNEE | Status: FUNCTIONAL
Brand: JOURNEY

## 2018-03-05 DEVICE — JOURNEY TIBIAL BASEPLATE NONPOROUS                                    LEFT SIZE 6
Type: IMPLANTABLE DEVICE | Site: KNEE | Status: FUNCTIONAL
Brand: JOURNEY

## 2018-03-05 DEVICE — JOURNEY 7.5 ROUND RESURF PAT 35MM STANDARD
Type: IMPLANTABLE DEVICE | Site: KNEE | Status: FUNCTIONAL
Brand: JOURNEY

## 2018-03-05 RX ORDER — FLUMAZENIL 0.1 MG/ML
0.2 INJECTION INTRAVENOUS AS NEEDED
Status: DISCONTINUED | OUTPATIENT
Start: 2018-03-05 | End: 2018-03-05 | Stop reason: HOSPADM

## 2018-03-05 RX ORDER — EPHEDRINE SULFATE 50 MG/ML
INJECTION, SOLUTION INTRAVENOUS AS NEEDED
Status: DISCONTINUED | OUTPATIENT
Start: 2018-03-05 | End: 2018-03-05 | Stop reason: SURG

## 2018-03-05 RX ORDER — ACETAMINOPHEN 160 MG/5ML
650 SOLUTION ORAL EVERY 4 HOURS PRN
Status: DISCONTINUED | OUTPATIENT
Start: 2018-03-05 | End: 2018-03-06 | Stop reason: HOSPADM

## 2018-03-05 RX ORDER — SODIUM CHLORIDE 0.9 % (FLUSH) 0.9 %
1-10 SYRINGE (ML) INJECTION AS NEEDED
Status: DISCONTINUED | OUTPATIENT
Start: 2018-03-05 | End: 2018-03-06 | Stop reason: HOSPADM

## 2018-03-05 RX ORDER — MIDAZOLAM HYDROCHLORIDE 1 MG/ML
1 INJECTION INTRAMUSCULAR; INTRAVENOUS
Status: DISCONTINUED | OUTPATIENT
Start: 2018-03-05 | End: 2018-03-05 | Stop reason: HOSPADM

## 2018-03-05 RX ORDER — CEFAZOLIN SODIUM 2 G/100ML
INJECTION, SOLUTION INTRAVENOUS AS NEEDED
Status: DISCONTINUED | OUTPATIENT
Start: 2018-03-05 | End: 2018-03-05 | Stop reason: SURG

## 2018-03-05 RX ORDER — NICOTINE POLACRILEX 4 MG
15 LOZENGE BUCCAL
Status: DISCONTINUED | OUTPATIENT
Start: 2018-03-05 | End: 2018-03-06 | Stop reason: HOSPADM

## 2018-03-05 RX ORDER — ACETAMINOPHEN 325 MG/1
325 TABLET ORAL EVERY 4 HOURS PRN
Status: DISCONTINUED | OUTPATIENT
Start: 2018-03-05 | End: 2018-03-06 | Stop reason: HOSPADM

## 2018-03-05 RX ORDER — HYDROMORPHONE HCL 110MG/55ML
0.5 PATIENT CONTROLLED ANALGESIA SYRINGE INTRAVENOUS
Status: DISCONTINUED | OUTPATIENT
Start: 2018-03-05 | End: 2018-03-05 | Stop reason: HOSPADM

## 2018-03-05 RX ORDER — ONDANSETRON 4 MG/1
4 TABLET, FILM COATED ORAL EVERY 6 HOURS PRN
Status: DISCONTINUED | OUTPATIENT
Start: 2018-03-05 | End: 2018-03-06 | Stop reason: HOSPADM

## 2018-03-05 RX ORDER — NALOXONE HCL 0.4 MG/ML
0.2 VIAL (ML) INJECTION AS NEEDED
Status: DISCONTINUED | OUTPATIENT
Start: 2018-03-05 | End: 2018-03-05 | Stop reason: HOSPADM

## 2018-03-05 RX ORDER — GABAPENTIN 300 MG/1
300 CAPSULE ORAL 4 TIMES DAILY
Status: DISCONTINUED | OUTPATIENT
Start: 2018-03-05 | End: 2018-03-06 | Stop reason: HOSPADM

## 2018-03-05 RX ORDER — DEXAMETHASONE SODIUM PHOSPHATE 10 MG/ML
INJECTION INTRAMUSCULAR; INTRAVENOUS AS NEEDED
Status: DISCONTINUED | OUTPATIENT
Start: 2018-03-05 | End: 2018-03-05 | Stop reason: SURG

## 2018-03-05 RX ORDER — LIDOCAINE HYDROCHLORIDE 20 MG/ML
INJECTION, SOLUTION INFILTRATION; PERINEURAL AS NEEDED
Status: DISCONTINUED | OUTPATIENT
Start: 2018-03-05 | End: 2018-03-05 | Stop reason: SURG

## 2018-03-05 RX ORDER — TAMSULOSIN HYDROCHLORIDE 0.4 MG/1
0.4 CAPSULE ORAL DAILY
Status: DISCONTINUED | OUTPATIENT
Start: 2018-03-05 | End: 2018-03-06 | Stop reason: HOSPADM

## 2018-03-05 RX ORDER — BISACODYL 5 MG/1
10 TABLET, DELAYED RELEASE ORAL DAILY PRN
Status: DISCONTINUED | OUTPATIENT
Start: 2018-03-05 | End: 2018-03-06 | Stop reason: HOSPADM

## 2018-03-05 RX ORDER — ACETAMINOPHEN 650 MG/1
650 SUPPOSITORY RECTAL EVERY 4 HOURS PRN
Status: DISCONTINUED | OUTPATIENT
Start: 2018-03-05 | End: 2018-03-06 | Stop reason: HOSPADM

## 2018-03-05 RX ORDER — ONDANSETRON 2 MG/ML
4 INJECTION INTRAMUSCULAR; INTRAVENOUS ONCE AS NEEDED
Status: DISCONTINUED | OUTPATIENT
Start: 2018-03-05 | End: 2018-03-05 | Stop reason: HOSPADM

## 2018-03-05 RX ORDER — DIPHENHYDRAMINE HYDROCHLORIDE 50 MG/ML
12.5 INJECTION INTRAMUSCULAR; INTRAVENOUS
Status: DISCONTINUED | OUTPATIENT
Start: 2018-03-05 | End: 2018-03-05 | Stop reason: HOSPADM

## 2018-03-05 RX ORDER — PROMETHAZINE HYDROCHLORIDE 25 MG/ML
12.5 INJECTION, SOLUTION INTRAMUSCULAR; INTRAVENOUS ONCE AS NEEDED
Status: DISCONTINUED | OUTPATIENT
Start: 2018-03-05 | End: 2018-03-05 | Stop reason: HOSPADM

## 2018-03-05 RX ORDER — BISACODYL 10 MG
10 SUPPOSITORY, RECTAL RECTAL DAILY PRN
Status: DISCONTINUED | OUTPATIENT
Start: 2018-03-05 | End: 2018-03-06 | Stop reason: HOSPADM

## 2018-03-05 RX ORDER — SODIUM CHLORIDE, SODIUM LACTATE, POTASSIUM CHLORIDE, CALCIUM CHLORIDE 600; 310; 30; 20 MG/100ML; MG/100ML; MG/100ML; MG/100ML
100 INJECTION, SOLUTION INTRAVENOUS CONTINUOUS
Status: DISCONTINUED | OUTPATIENT
Start: 2018-03-05 | End: 2018-03-06 | Stop reason: HOSPADM

## 2018-03-05 RX ORDER — LABETALOL HYDROCHLORIDE 5 MG/ML
5 INJECTION, SOLUTION INTRAVENOUS
Status: DISCONTINUED | OUTPATIENT
Start: 2018-03-05 | End: 2018-03-05 | Stop reason: HOSPADM

## 2018-03-05 RX ORDER — ALLOPURINOL 300 MG/1
300 TABLET ORAL DAILY
Status: DISCONTINUED | OUTPATIENT
Start: 2018-03-05 | End: 2018-03-06 | Stop reason: HOSPADM

## 2018-03-05 RX ORDER — OXYCODONE AND ACETAMINOPHEN 10; 325 MG/1; MG/1
2 TABLET ORAL EVERY 4 HOURS PRN
Status: DISCONTINUED | OUTPATIENT
Start: 2018-03-05 | End: 2018-03-06 | Stop reason: HOSPADM

## 2018-03-05 RX ORDER — MAGNESIUM HYDROXIDE 1200 MG/15ML
LIQUID ORAL AS NEEDED
Status: DISCONTINUED | OUTPATIENT
Start: 2018-03-05 | End: 2018-03-05 | Stop reason: HOSPADM

## 2018-03-05 RX ORDER — SODIUM CHLORIDE 9 MG/ML
INJECTION, SOLUTION INTRAVENOUS AS NEEDED
Status: DISCONTINUED | OUTPATIENT
Start: 2018-03-05 | End: 2018-03-05 | Stop reason: HOSPADM

## 2018-03-05 RX ORDER — MIDAZOLAM HYDROCHLORIDE 1 MG/ML
2 INJECTION INTRAMUSCULAR; INTRAVENOUS
Status: DISCONTINUED | OUTPATIENT
Start: 2018-03-05 | End: 2018-03-05 | Stop reason: HOSPADM

## 2018-03-05 RX ORDER — SODIUM CHLORIDE 0.9 % (FLUSH) 0.9 %
1-10 SYRINGE (ML) INJECTION AS NEEDED
Status: DISCONTINUED | OUTPATIENT
Start: 2018-03-05 | End: 2018-03-05 | Stop reason: HOSPADM

## 2018-03-05 RX ORDER — FENTANYL CITRATE 50 UG/ML
50 INJECTION, SOLUTION INTRAMUSCULAR; INTRAVENOUS
Status: DISCONTINUED | OUTPATIENT
Start: 2018-03-05 | End: 2018-03-05 | Stop reason: HOSPADM

## 2018-03-05 RX ORDER — FENTANYL CITRATE 50 UG/ML
INJECTION, SOLUTION INTRAMUSCULAR; INTRAVENOUS AS NEEDED
Status: DISCONTINUED | OUTPATIENT
Start: 2018-03-05 | End: 2018-03-05 | Stop reason: SURG

## 2018-03-05 RX ORDER — FERROUS SULFATE 325(65) MG
325 TABLET ORAL
Status: DISCONTINUED | OUTPATIENT
Start: 2018-03-06 | End: 2018-03-06 | Stop reason: HOSPADM

## 2018-03-05 RX ORDER — SODIUM CHLORIDE, SODIUM LACTATE, POTASSIUM CHLORIDE, CALCIUM CHLORIDE 600; 310; 30; 20 MG/100ML; MG/100ML; MG/100ML; MG/100ML
9 INJECTION, SOLUTION INTRAVENOUS CONTINUOUS PRN
Status: DISCONTINUED | OUTPATIENT
Start: 2018-03-05 | End: 2018-03-05 | Stop reason: HOSPADM

## 2018-03-05 RX ORDER — MIDAZOLAM HYDROCHLORIDE 1 MG/ML
INJECTION INTRAMUSCULAR; INTRAVENOUS AS NEEDED
Status: DISCONTINUED | OUTPATIENT
Start: 2018-03-05 | End: 2018-03-05 | Stop reason: SURG

## 2018-03-05 RX ORDER — TRANEXAMIC ACID 100 MG/ML
INJECTION, SOLUTION INTRAVENOUS AS NEEDED
Status: DISCONTINUED | OUTPATIENT
Start: 2018-03-05 | End: 2018-03-05 | Stop reason: SURG

## 2018-03-05 RX ORDER — ASPIRIN 325 MG
325 TABLET, DELAYED RELEASE (ENTERIC COATED) ORAL DAILY
Status: DISCONTINUED | OUTPATIENT
Start: 2018-03-05 | End: 2018-03-06 | Stop reason: HOSPADM

## 2018-03-05 RX ORDER — ONDANSETRON 4 MG/1
4 TABLET, ORALLY DISINTEGRATING ORAL EVERY 6 HOURS PRN
Status: DISCONTINUED | OUTPATIENT
Start: 2018-03-05 | End: 2018-03-06 | Stop reason: HOSPADM

## 2018-03-05 RX ORDER — ACETAMINOPHEN 500 MG
1000 TABLET ORAL ONCE
Status: COMPLETED | OUTPATIENT
Start: 2018-03-05 | End: 2018-03-05

## 2018-03-05 RX ORDER — PROPOFOL 10 MG/ML
VIAL (ML) INTRAVENOUS AS NEEDED
Status: DISCONTINUED | OUTPATIENT
Start: 2018-03-05 | End: 2018-03-05 | Stop reason: SURG

## 2018-03-05 RX ORDER — PROMETHAZINE HYDROCHLORIDE 25 MG/1
25 TABLET ORAL ONCE AS NEEDED
Status: DISCONTINUED | OUTPATIENT
Start: 2018-03-05 | End: 2018-03-05 | Stop reason: HOSPADM

## 2018-03-05 RX ORDER — HYDROMORPHONE HCL 110MG/55ML
PATIENT CONTROLLED ANALGESIA SYRINGE INTRAVENOUS AS NEEDED
Status: DISCONTINUED | OUTPATIENT
Start: 2018-03-05 | End: 2018-03-05 | Stop reason: SURG

## 2018-03-05 RX ORDER — DEXTROSE MONOHYDRATE 25 G/50ML
25 INJECTION, SOLUTION INTRAVENOUS
Status: DISCONTINUED | OUTPATIENT
Start: 2018-03-05 | End: 2018-03-06 | Stop reason: HOSPADM

## 2018-03-05 RX ORDER — KETAMINE HYDROCHLORIDE 100 MG/ML
INJECTION INTRAMUSCULAR; INTRAVENOUS AS NEEDED
Status: DISCONTINUED | OUTPATIENT
Start: 2018-03-05 | End: 2018-03-05 | Stop reason: SURG

## 2018-03-05 RX ORDER — NALOXONE HCL 0.4 MG/ML
0.1 VIAL (ML) INJECTION
Status: DISCONTINUED | OUTPATIENT
Start: 2018-03-05 | End: 2018-03-06 | Stop reason: HOSPADM

## 2018-03-05 RX ORDER — LISINOPRIL 10 MG/1
10 TABLET ORAL DAILY
Status: DISCONTINUED | OUTPATIENT
Start: 2018-03-05 | End: 2018-03-06 | Stop reason: HOSPADM

## 2018-03-05 RX ORDER — ONDANSETRON 2 MG/ML
4 INJECTION INTRAMUSCULAR; INTRAVENOUS EVERY 6 HOURS PRN
Status: DISCONTINUED | OUTPATIENT
Start: 2018-03-05 | End: 2018-03-06 | Stop reason: HOSPADM

## 2018-03-05 RX ORDER — ONDANSETRON 2 MG/ML
INJECTION INTRAMUSCULAR; INTRAVENOUS AS NEEDED
Status: DISCONTINUED | OUTPATIENT
Start: 2018-03-05 | End: 2018-03-05 | Stop reason: SURG

## 2018-03-05 RX ORDER — KETOROLAC TROMETHAMINE 30 MG/ML
30 INJECTION, SOLUTION INTRAMUSCULAR; INTRAVENOUS EVERY 6 HOURS
Status: COMPLETED | OUTPATIENT
Start: 2018-03-05 | End: 2018-03-06

## 2018-03-05 RX ORDER — HYDRALAZINE HYDROCHLORIDE 20 MG/ML
5 INJECTION INTRAMUSCULAR; INTRAVENOUS
Status: DISCONTINUED | OUTPATIENT
Start: 2018-03-05 | End: 2018-03-05 | Stop reason: HOSPADM

## 2018-03-05 RX ORDER — OXYCODONE AND ACETAMINOPHEN 10; 325 MG/1; MG/1
1 TABLET ORAL EVERY 4 HOURS PRN
Status: DISCONTINUED | OUTPATIENT
Start: 2018-03-05 | End: 2018-03-06 | Stop reason: HOSPADM

## 2018-03-05 RX ORDER — OXYCODONE AND ACETAMINOPHEN 7.5; 325 MG/1; MG/1
1 TABLET ORAL ONCE AS NEEDED
Status: DISCONTINUED | OUTPATIENT
Start: 2018-03-05 | End: 2018-03-05 | Stop reason: HOSPADM

## 2018-03-05 RX ORDER — PROMETHAZINE HYDROCHLORIDE 25 MG/1
25 SUPPOSITORY RECTAL ONCE AS NEEDED
Status: DISCONTINUED | OUTPATIENT
Start: 2018-03-05 | End: 2018-03-05 | Stop reason: HOSPADM

## 2018-03-05 RX ORDER — HYDROCODONE BITARTRATE AND ACETAMINOPHEN 7.5; 325 MG/1; MG/1
1 TABLET ORAL ONCE AS NEEDED
Status: DISCONTINUED | OUTPATIENT
Start: 2018-03-05 | End: 2018-03-05 | Stop reason: HOSPADM

## 2018-03-05 RX ORDER — FAMOTIDINE 10 MG/ML
20 INJECTION, SOLUTION INTRAVENOUS ONCE
Status: COMPLETED | OUTPATIENT
Start: 2018-03-05 | End: 2018-03-05

## 2018-03-05 RX ORDER — PROMETHAZINE HYDROCHLORIDE 25 MG/1
12.5 TABLET ORAL ONCE AS NEEDED
Status: DISCONTINUED | OUTPATIENT
Start: 2018-03-05 | End: 2018-03-05 | Stop reason: HOSPADM

## 2018-03-05 RX ORDER — ACETAMINOPHEN 325 MG/1
650 TABLET ORAL EVERY 4 HOURS PRN
Status: DISCONTINUED | OUTPATIENT
Start: 2018-03-05 | End: 2018-03-06 | Stop reason: HOSPADM

## 2018-03-05 RX ORDER — CEFAZOLIN SODIUM 2 G/100ML
2 INJECTION, SOLUTION INTRAVENOUS EVERY 8 HOURS
Status: COMPLETED | OUTPATIENT
Start: 2018-03-05 | End: 2018-03-06

## 2018-03-05 RX ORDER — AMLODIPINE BESYLATE 5 MG/1
5 TABLET ORAL DAILY
Status: DISCONTINUED | OUTPATIENT
Start: 2018-03-05 | End: 2018-03-06 | Stop reason: HOSPADM

## 2018-03-05 RX ADMIN — FAMOTIDINE 20 MG: 10 INJECTION, SOLUTION INTRAVENOUS at 13:49

## 2018-03-05 RX ADMIN — FENTANYL CITRATE 50 MCG: 50 INJECTION, SOLUTION INTRAMUSCULAR; INTRAVENOUS at 13:50

## 2018-03-05 RX ADMIN — KETAMINE HYDROCHLORIDE 10 MG: 100 INJECTION, SOLUTION INTRAMUSCULAR; INTRAVENOUS at 15:30

## 2018-03-05 RX ADMIN — AMLODIPINE BESYLATE 5 MG: 5 TABLET ORAL at 20:41

## 2018-03-05 RX ADMIN — ASPIRIN 325 MG: 325 TABLET, DELAYED RELEASE ORAL at 18:45

## 2018-03-05 RX ADMIN — OXYCODONE HYDROCHLORIDE AND ACETAMINOPHEN 2 TABLET: 10; 325 TABLET ORAL at 18:45

## 2018-03-05 RX ADMIN — ACETAMINOPHEN 500 MG: 500 TABLET ORAL at 13:22

## 2018-03-05 RX ADMIN — DEXAMETHASONE SODIUM PHOSPHATE 4 MG: 10 INJECTION INTRAMUSCULAR; INTRAVENOUS at 15:20

## 2018-03-05 RX ADMIN — VANCOMYCIN HYDROCHLORIDE 1500 MG: 10 INJECTION, POWDER, LYOPHILIZED, FOR SOLUTION INTRAVENOUS at 13:20

## 2018-03-05 RX ADMIN — MIDAZOLAM 1 MG: 1 INJECTION INTRAMUSCULAR; INTRAVENOUS at 13:50

## 2018-03-05 RX ADMIN — FENTANYL CITRATE 50 MCG: 50 INJECTION, SOLUTION INTRAMUSCULAR; INTRAVENOUS at 17:35

## 2018-03-05 RX ADMIN — HYDROMORPHONE HYDROCHLORIDE 1 MG: 2 INJECTION INTRAMUSCULAR; INTRAVENOUS; SUBCUTANEOUS at 15:13

## 2018-03-05 RX ADMIN — CEFAZOLIN SODIUM 2 G: 2 INJECTION, SOLUTION INTRAVENOUS at 22:46

## 2018-03-05 RX ADMIN — HYDROMORPHONE HYDROCHLORIDE 0.5 MG: 2 INJECTION INTRAMUSCULAR; INTRAVENOUS; SUBCUTANEOUS at 15:32

## 2018-03-05 RX ADMIN — ALLOPURINOL 300 MG: 300 TABLET ORAL at 20:41

## 2018-03-05 RX ADMIN — FENTANYL CITRATE 50 MCG: 50 INJECTION, SOLUTION INTRAMUSCULAR; INTRAVENOUS at 17:11

## 2018-03-05 RX ADMIN — PROPOFOL 200 MG: 10 INJECTION, EMULSION INTRAVENOUS at 14:39

## 2018-03-05 RX ADMIN — FENTANYL CITRATE 50 MCG: 50 INJECTION, SOLUTION INTRAMUSCULAR; INTRAVENOUS at 17:04

## 2018-03-05 RX ADMIN — TRANEXAMIC ACID 1000 MG: 100 INJECTION, SOLUTION INTRAVENOUS at 15:56

## 2018-03-05 RX ADMIN — KETOROLAC TROMETHAMINE 30 MG: 30 INJECTION, SOLUTION INTRAMUSCULAR at 17:04

## 2018-03-05 RX ADMIN — FENTANYL CITRATE 50 MCG: 50 INJECTION, SOLUTION INTRAMUSCULAR; INTRAVENOUS at 17:22

## 2018-03-05 RX ADMIN — FENTANYL CITRATE 50 MCG: 50 INJECTION, SOLUTION INTRAMUSCULAR; INTRAVENOUS at 15:06

## 2018-03-05 RX ADMIN — METFORMIN HYDROCHLORIDE 500 MG: 500 TABLET ORAL at 20:41

## 2018-03-05 RX ADMIN — SODIUM CHLORIDE, POTASSIUM CHLORIDE, SODIUM LACTATE AND CALCIUM CHLORIDE: 600; 310; 30; 20 INJECTION, SOLUTION INTRAVENOUS at 15:10

## 2018-03-05 RX ADMIN — LIDOCAINE HYDROCHLORIDE 100 MG: 20 INJECTION, SOLUTION INFILTRATION; PERINEURAL at 14:39

## 2018-03-05 RX ADMIN — HYDROMORPHONE HYDROCHLORIDE 0.5 MG: 2 INJECTION INTRAMUSCULAR; INTRAVENOUS; SUBCUTANEOUS at 17:15

## 2018-03-05 RX ADMIN — ONDANSETRON 4 MG: 2 INJECTION INTRAMUSCULAR; INTRAVENOUS at 16:05

## 2018-03-05 RX ADMIN — SODIUM CHLORIDE, POTASSIUM CHLORIDE, SODIUM LACTATE AND CALCIUM CHLORIDE 100 ML/HR: 600; 310; 30; 20 INJECTION, SOLUTION INTRAVENOUS at 18:45

## 2018-03-05 RX ADMIN — GABAPENTIN 300 MG: 300 CAPSULE ORAL at 20:41

## 2018-03-05 RX ADMIN — OXYCODONE HYDROCHLORIDE AND ACETAMINOPHEN 2 TABLET: 10; 325 TABLET ORAL at 22:46

## 2018-03-05 RX ADMIN — TAMSULOSIN HYDROCHLORIDE 0.4 MG: 0.4 CAPSULE ORAL at 20:41

## 2018-03-05 RX ADMIN — FENTANYL CITRATE 100 MCG: 50 INJECTION, SOLUTION INTRAMUSCULAR; INTRAVENOUS at 15:08

## 2018-03-05 RX ADMIN — Medication 2 MG: at 16:30

## 2018-03-05 RX ADMIN — HYDROMORPHONE HYDROCHLORIDE 0.5 MG: 2 INJECTION INTRAMUSCULAR; INTRAVENOUS; SUBCUTANEOUS at 17:30

## 2018-03-05 RX ADMIN — CEFAZOLIN SODIUM 2 G: 2 INJECTION, SOLUTION INTRAVENOUS at 14:49

## 2018-03-05 RX ADMIN — FENTANYL CITRATE 100 MCG: 50 INJECTION, SOLUTION INTRAMUSCULAR; INTRAVENOUS at 14:39

## 2018-03-05 RX ADMIN — EPHEDRINE SULFATE 10 MG: 50 INJECTION INTRAMUSCULAR; INTRAVENOUS; SUBCUTANEOUS at 15:29

## 2018-03-05 RX ADMIN — KETAMINE HYDROCHLORIDE 20 MG: 100 INJECTION, SOLUTION INTRAMUSCULAR; INTRAVENOUS at 16:16

## 2018-03-05 RX ADMIN — HYDROMORPHONE HYDROCHLORIDE 0.5 MG: 2 INJECTION INTRAMUSCULAR; INTRAVENOUS; SUBCUTANEOUS at 17:05

## 2018-03-05 RX ADMIN — SODIUM CHLORIDE, POTASSIUM CHLORIDE, SODIUM LACTATE AND CALCIUM CHLORIDE 9 ML/HR: 600; 310; 30; 20 INJECTION, SOLUTION INTRAVENOUS at 13:19

## 2018-03-05 RX ADMIN — KETOROLAC TROMETHAMINE 30 MG: 30 INJECTION, SOLUTION INTRAMUSCULAR at 22:46

## 2018-03-05 RX ADMIN — KETAMINE HYDROCHLORIDE 20 MG: 100 INJECTION, SOLUTION INTRAMUSCULAR; INTRAVENOUS at 15:14

## 2018-03-05 RX ADMIN — HYDROMORPHONE HYDROCHLORIDE 0.5 MG: 2 INJECTION INTRAMUSCULAR; INTRAVENOUS; SUBCUTANEOUS at 15:35

## 2018-03-05 NOTE — OP NOTE
Vinod Crooks M.D. - [facility]    Patient Name:  Flaco Miles  YOB: 1965  Medical Records Number:  [id number]    Date of Procedure:  3/5/2018    Pre-operative Diagnosis:  DJD Left Knee    Post-operative Diagnosis:  DJD Left Knee    Procedure Performed:  Left Total Knee Replacement.  Removal of hardware (tibial interference screw).     Anesthesia: General, supplemented by a periarticular Exparel/bupivacaine injection.      Surgeon: Vinod Crooks MD     Assistant: Sarkis Bentley MD; note that as part of the surgical procedure, I utilized service of an assistant surgeon, specifically Sarkis Bentley MD. Assistant surgeon participated in crucial portion of the operation. Use of the assistant surgeon greatly reduced overall operative time, thus significantly reducing overall morbidity for the patient.      Implants:      Implant Name Type Inv. Item Serial No.  Lot No. LRB No. Used Action   CMT BONE PALACOS [id number] - [id number] Implant CMT BONE PALACOS [id number]  [employer] [id number] Left 2 Implanted   COMP FEM JOURNEY2 BCS OXINIUM SZ6 LT - [id number] Implant COMP FEM JOURNEY2 BCS OXINIUM SZ6 LT  [employer] [id number] Left 1 Implanted   PATELLA RESRF JOURNEY 7.5X35MM RND - [id number] Implant PATELLA RESRF JOURNEY 7.5X35MM RND  [employer] [id number] Left 1 Implanted   BASEPLT TIB JOURNEY NONPOR SZ6 LT - [id number] Implant BASEPLT TIB JOURNEY NONPOR SZ6 LT  [employer] [id number] Left 1 Implanted   LEFT SIZE 5-6 ARTICULAR INSERT Implant     [employer] [id number] Left 1 Implanted       Implants utilized: I used the Smith nephew journey 2 system.  I used a size 6 tibial baseplate.  Size 6 PS   femur.  12 mm  polyethylene insert.  35 patella.    Tourniquet Time: Was 79 minutes.      Medications: Note that we gave 1 g IV tranexamic acid when the cement was mixed.      Estimated Blood Loss:   100 mL    Specimens: * No orders in the log *     Complications:  None.      Quality Measures: I have documented the patient's current medications including dosage, frequency, and route of administration in medical record today. Conservative alternatives were discussed with the patient as part of the decision-making process prior to the procedure. The patient was evaluated immediately preoperatively for cardiovascular and thromboembolic risk factors.  There were no acute risk factors.  Prophylactic IV antibiotics were administered before the tourniquet went up.      Description of Procedure: After prep and drape, Left knee was approached via midline longitudinal anterior incision. Medial parapatellar arthrotomy was extended to the vastus medialis obliquus which was split in line with the fibers near the medial border, in keeping with minimally invasive technique. Patella was osteotomized proper depth for the patella implant. [place] holes are created. Patella was subluxed and protected. Intramedullary instrumentation was used on each side of the joint; careful and thorough canal content irrigation. I cut the femur 10 mm depth, 5° valgus controlling rotation. The femur  was sized appropriatelyt. Anterior, posterior, and chamfer cuts were made.  On exposing the tibia we encountered the tibial interference screw.  This was fully exposed and then removed from distalward.  Tibia is cut 10 mm depth, 5° of posterior slope. Meniscectomies are completed. Trial reduction is performed. Rotation is marked and keel slot was created.  I noted that we did not have satisfactory posterior stability.  The notch of the femur was resected in preparation for a PS implant.     Bony surface was thoroughly cleaned and dried. I injected the posterior capsule and medial lateral gutter with Exparel solution containing 20 mL Exparel, 25 mL 0.25% bupivacaine with epinephrine, 20 mL saline. Care was taken to protect popliteal neurovascular structures and the peroneal nerve. I cemented the tibial baseplate,   Femur, and patella.  Trial reduction revealed a 12 mm PS polyethylene insert best balanced stability and range of motion, and is locked in the tibial tray.      Tourniquet was released; hemostasis obtained. Wound was closed in layers with #1 Vicryl on the capsule, 2-0 Vicryl in subcutaneous tissue, and staples in the skin over a 1/8-inch drain. Note that I injected my capsule with my Exparel solution during closure.      Vinod Crooks MD  3/5/2018  4:46 PM

## 2018-03-05 NOTE — ANESTHESIA PREPROCEDURE EVALUATION
Anesthesia Evaluation     history of anesthetic complications (wakes up fighting violently):               Airway   Mallampati: II  no difficulty expected  Dental - normal exam     Pulmonary - normal exam   (+) a smoker (smokes a pipe twice a month, none today, cessation advised), sleep apnea,   (-) COPD, asthma    PE comment: nonlabored  Cardiovascular - normal exam    Rhythm: regular  Rate: normal    (+) hypertension, hyperlipidemia,   (-) valvular problems/murmurs, past MI, CAD, dysrhythmias, angina      Neuro/Psych  (+) headaches, numbness (peripheral neuropathy),     (-) seizures, TIA, CVA  GI/Hepatic/Renal/Endo    (+) obesity,   renal disease stones, diabetes mellitus type 2 well controlled,   (-) GERD, liver disease, hypothyroidism, hyperthyroidism    Musculoskeletal     (+) arthralgias, chronic pain, neck pain,   Abdominal    Substance History      OB/GYN          Other   (+) arthritis                     Anesthesia Plan    ASA 3     general     intravenous induction   Anesthetic plan and risks discussed with patient.

## 2018-03-05 NOTE — ANESTHESIA POSTPROCEDURE EVALUATION
"Patient: Flaco Miles    Procedure Summary     Date Anesthesia Start Anesthesia Stop Room / Location    03/05/18 1435 1650  [facility] /  [facility]       Procedure Diagnosis Surgeon Provider    LEFT TOTAL KNEE ARTHROPLASTY (Left Knee) No diagnosis on file. MD Kaila Morse MD          Anesthesia Type: general  Last vitals  BP   156/94 (03/05/18 1740)   Temp   36.6 °C (97.9 °F) (03/05/18 1740)   Pulse   77 (03/05/18 1740)   Resp   16 (03/05/18 1740)     SpO2   94 % (03/05/18 1740)     Post Anesthesia Care and Evaluation    Patient location during evaluation: bedside  Patient participation: complete - patient participated  Level of consciousness: awake  Pain score: 2  Pain management: adequate  Airway patency: patent  Anesthetic complications: No anesthetic complications  PONV Status: none  Cardiovascular status: acceptable  Respiratory status: acceptable  Hydration status: acceptable    Comments: /94 (BP Location: Left arm, Patient Position: Lying)  Pulse 77  Temp 36.6 °C (97.9 °F) (Oral)   Resp 16  Ht 180.3 cm (71\")  Wt 110 kg (243 lb 1 oz)  SpO2 94%  BMI 33.9 kg/m2        "

## 2018-03-05 NOTE — PLAN OF CARE
Problem: Patient Care Overview (Adult)  Goal: Plan of Care Review  Outcome: Ongoing (interventions implemented as appropriate)   03/05/18 1317   Coping/Psychosocial Response Interventions   Plan Of Care Reviewed With patient   Patient Care Overview   Progress no change

## 2018-03-05 NOTE — ANESTHESIA PROCEDURE NOTES
Airway  Urgency: elective    Airway not difficult    General Information and Staff    Patient location during procedure: OR  Anesthesiologist: CLARK ANDRADE  CRNA: LALITA SWIFT    Indications and Patient Condition  Indications for airway management: airway protection    Preoxygenated: yes  Mask difficulty assessment: 0 - not attempted    Final Airway Details  Final airway type: supraglottic airway      Successful airway: classic  Size 5    Number of attempts at approach: 1    Additional Comments  LMA inserted without difficulty.  Lips and teeth intact as preop condition.  No signs or symptoms of gastric regurgitation.  Seal with minimal pressure and secure.

## 2018-03-05 NOTE — PLAN OF CARE
Problem: Patient Care Overview (Adult)  Goal: Plan of Care Review  Outcome: Outcome(s) achieved Date Met: 03/05/18 03/05/18 1316   Coping/Psychosocial Response Interventions   Plan Of Care Reviewed With patient   Patient Care Overview   Progress no change     Goal: Adult Individualization and Mutuality  Outcome: Ongoing (interventions implemented as appropriate)   03/05/18 1316   Individualization   Patient Specific Preferences pefers to b called elijah   Patient Specific Goals improved mobility in left knee     Goal: Discharge Needs Assessment  Outcome: Ongoing (interventions implemented as appropriate)   03/05/18 1316   Discharge Needs Assessment   Concerns To Be Addressed no discharge needs identified;denies needs/concerns at this time       Problem: Perioperative Period (Adult)  Goal: Signs and Symptoms of Listed Potential Problems Will be Absent or Manageable (Perioperative Period)  Outcome: Ongoing (interventions implemented as appropriate)   03/05/18 1316   Perioperative Period   Problems Assessed (Perioperative Period) pain;wound complications;embolism;hemorrhage;hypothermia;hypoxia/hypoxemia;infection;physiologic stress response   Problems Present (Perioperative Period) pain

## 2018-03-06 VITALS
BODY MASS INDEX: 34.03 KG/M2 | SYSTOLIC BLOOD PRESSURE: 110 MMHG | DIASTOLIC BLOOD PRESSURE: 64 MMHG | HEART RATE: 62 BPM | HEIGHT: 71 IN | RESPIRATION RATE: 16 BRPM | WEIGHT: 243.06 LBS | TEMPERATURE: 97.6 F | OXYGEN SATURATION: 96 %

## 2018-03-06 LAB
ANION GAP SERPL CALCULATED.3IONS-SCNC: 13.2 MMOL/L
BUN BLD-MCNC: 20 MG/DL (ref 6–20)
BUN/CREAT SERPL: 23 (ref 7–25)
CALCIUM SPEC-SCNC: 8.8 MG/DL (ref 8.6–10.5)
CHLORIDE SERPL-SCNC: 97 MMOL/L (ref 98–107)
CO2 SERPL-SCNC: 23.8 MMOL/L (ref 22–29)
CREAT BLD-MCNC: 0.87 MG/DL (ref 0.76–1.27)
GFR SERPL CREATININE-BSD FRML MDRD: 93 ML/MIN/1.73
GLUCOSE BLD-MCNC: 179 MG/DL (ref 65–99)
GLUCOSE BLDC GLUCOMTR-MCNC: 175 MG/DL (ref 70–130)
GLUCOSE BLDC GLUCOMTR-MCNC: 200 MG/DL (ref 70–130)
HCT VFR BLD AUTO: 39.3 % (ref 40.4–52.2)
HGB BLD-MCNC: 13 G/DL (ref 13.7–17.6)
POTASSIUM BLD-SCNC: 4.7 MMOL/L (ref 3.5–5.2)
SODIUM BLD-SCNC: 134 MMOL/L (ref 136–145)

## 2018-03-06 PROCEDURE — 25010000002 KETOROLAC TROMETHAMINE PER 15 MG: Performed by: ORTHOPAEDIC SURGERY

## 2018-03-06 PROCEDURE — 80048 BASIC METABOLIC PNL TOTAL CA: CPT | Performed by: ORTHOPAEDIC SURGERY

## 2018-03-06 PROCEDURE — 25010000002 FONDAPARINUX PER 0.5 MG: Performed by: ORTHOPAEDIC SURGERY

## 2018-03-06 PROCEDURE — G8978 MOBILITY CURRENT STATUS: HCPCS

## 2018-03-06 PROCEDURE — 82962 GLUCOSE BLOOD TEST: CPT

## 2018-03-06 PROCEDURE — G8980 MOBILITY D/C STATUS: HCPCS

## 2018-03-06 PROCEDURE — 85018 HEMOGLOBIN: CPT | Performed by: ORTHOPAEDIC SURGERY

## 2018-03-06 PROCEDURE — 97161 PT EVAL LOW COMPLEX 20 MIN: CPT

## 2018-03-06 PROCEDURE — 85014 HEMATOCRIT: CPT | Performed by: ORTHOPAEDIC SURGERY

## 2018-03-06 PROCEDURE — G8979 MOBILITY GOAL STATUS: HCPCS

## 2018-03-06 PROCEDURE — 25010000003 CEFAZOLIN IN DEXTROSE 2-4 GM/100ML-% SOLUTION: Performed by: ORTHOPAEDIC SURGERY

## 2018-03-06 RX ORDER — FONDAPARINUX SODIUM 2.5 MG/.5ML
2.5 INJECTION SUBCUTANEOUS ONCE
Status: COMPLETED | OUTPATIENT
Start: 2018-03-06 | End: 2018-03-06

## 2018-03-06 RX ADMIN — OXYCODONE HYDROCHLORIDE AND ACETAMINOPHEN 2 TABLET: 10; 325 TABLET ORAL at 02:46

## 2018-03-06 RX ADMIN — FERROUS SULFATE TAB 325 MG (65 MG ELEMENTAL FE) 325 MG: 325 (65 FE) TAB at 08:17

## 2018-03-06 RX ADMIN — ASPIRIN 325 MG: 325 TABLET, DELAYED RELEASE ORAL at 08:17

## 2018-03-06 RX ADMIN — FONDAPARINUX SODIUM 2.5 MG: 2.5 INJECTION, SOLUTION SUBCUTANEOUS at 08:17

## 2018-03-06 RX ADMIN — OXYCODONE HYDROCHLORIDE AND ACETAMINOPHEN 2 TABLET: 10; 325 TABLET ORAL at 11:16

## 2018-03-06 RX ADMIN — CEFAZOLIN SODIUM 2 G: 2 INJECTION, SOLUTION INTRAVENOUS at 06:40

## 2018-03-06 RX ADMIN — OXYCODONE HYDROCHLORIDE AND ACETAMINOPHEN 2 TABLET: 10; 325 TABLET ORAL at 06:40

## 2018-03-06 RX ADMIN — KETOROLAC TROMETHAMINE 30 MG: 30 INJECTION, SOLUTION INTRAMUSCULAR at 11:16

## 2018-03-06 RX ADMIN — LISINOPRIL 10 MG: 10 TABLET ORAL at 08:17

## 2018-03-06 RX ADMIN — KETOROLAC TROMETHAMINE 30 MG: 30 INJECTION, SOLUTION INTRAMUSCULAR at 05:17

## 2018-03-06 RX ADMIN — GABAPENTIN 300 MG: 300 CAPSULE ORAL at 08:17

## 2018-03-06 NOTE — CONSULTS
Inpatient Consult to Hospitalist  Consult performed by: MICHELINE LEE  Consult ordered by: RONALD MOLINA          Patient Care Team:  Hayden Morgan MD as PCP - General (Internal Medicine)  Hayden Morgan MD as PCP - Claims Attributed    Chief complaint: Management of diabetes mellitus 2    Subjective     History of Present Illness  52-year-old gentleman who has a history of type 2 diabetes, diabetic neuropathy, hypertension who underwent a left total knee replacement this afternoon.  Postoperatively the patient is doing quite well resting in bed comfortably answering questions appropriately.    He currently denies any nausea or vomiting.  The patient is having an appropriate amount of pain.  He denies any chest pain or shortness of air.    As far as the patient's diabetes is concerned he is on just metformin at home and his blood sugars have been reasonably well-controlled with and a1c 6.5 on 1/24.    The patient's blood pressure has also been reasonably well-controlled at home and he is on Norvasc and lisinopril for this.  Blood pressures here are good at 120 - 140 mostly.    Review of Systems   Constitutional: Negative for activity change and appetite change.   HENT: Negative for dental problem, postnasal drip and rhinorrhea.    Respiratory: Negative for apnea and shortness of breath.    Cardiovascular: Negative for chest pain and palpitations.   Gastrointestinal: Negative for abdominal distention and abdominal pain.   Endocrine: Negative for cold intolerance and polydipsia.   Genitourinary: Negative for difficulty urinating and dysuria.   Musculoskeletal: Negative for arthralgias.   Neurological: Negative for dizziness and numbness.   Hematological: Negative for adenopathy.   Psychiatric/Behavioral: Negative for agitation and behavioral problems.        Past Medical History:   Diagnosis Date   • Anesthesia complication     WAKES VIOLENTLY.  UNKNOWN REASON   • DDD (degenerative disc disease), cervical    •  Diabetes mellitus    • DJD (degenerative joint disease)    • Gout    • History of kidney stones    • Hyperlipidemia    • Hypertension    • Left knee pain    • Migraines    • Sleep apnea     NO MACHINE   • Sleep disorder    ,   Past Surgical History:   Procedure Laterality Date   • ADENOIDECTOMY     • ANKLE ARTHROPLASTY Left    • BACK SURGERY     • CERVICAL FUSION     • CHOLECYSTECTOMY     • ELBOW ARTHROPLASTY Right    • GALLBLADDER SURGERY     • HERNIA REPAIR     • KNEE ARTHROPLASTY Left    • LEG SURGERY      from spider bites   • NECK SURGERY      c1-c6 plate and cage   • SHOULDER ARTHROSCOPY Right    • TESTICLE SURGERY     • TONSILLECTOMY     ,   Family History   Problem Relation Age of Onset   • Stroke Mother    • Hypertension Mother    • Alzheimer's disease Father    • Dementia Father    • Hypertension Father    • Alcohol abuse Sister    • Hypertension Sister    • Malig Hyperthermia Neg Hx    ,   Social History   Substance Use Topics   • Smoking status: Current Some Day Smoker     Types: Pipe   • Smokeless tobacco: Former User     Types: Chew      Comment: 1-2 times a month   • Alcohol use Yes      Comment: monthly   ,   Prescriptions Prior to Admission   Medication Sig Dispense Refill Last Dose   • ACCU-CHEK SOFTCLIX LANCETS lancets    3/4/2018 at Unknown time   • allopurinol (ZYLOPRIM) 300 MG tablet Take 1 tablet by mouth Daily. 90 tablet 1 3/4/2018 at 2300   • amLODIPine (NORVASC) 5 MG tablet Take 1 tablet by mouth Daily. 90 tablet 1 3/4/2018 at 2300   • atorvastatin (LIPITOR) 20 MG tablet Take 1 tablet by mouth Daily. 90 tablet 1 3/4/2018 at 0800   • clobetasol (TEMOVATE) 0.05 % cream Apply  topically 2 (Two) Times a Day As Needed (itching). 45 g 0 Taking   • gabapentin (NEURONTIN) 300 MG capsule Take 1 capsule by mouth 4 (Four) Times a Day. (Patient taking differently: Take 600 mg by mouth 2 (Two) Times a Day.) 120 capsule 3 3/4/2018 at 2300   • glucose blood test strip Accu-Chek Marie Plus In Vitro Strip;  Patient Sig: Accu-Chek Marie Plus In Vitro Strip ; 50; 0; 25-Aug-2014; Active   3/4/2018 at Unknown time   • lisinopril (PRINIVIL,ZESTRIL) 10 MG tablet Take 1 tablet by mouth Daily. 90 tablet 1 3/4/2018 at 0800   • metFORMIN (GLUCOPHAGE) 500 MG tablet Take 1 tablet by mouth 2 (Two) Times a Day With Meals. 180 tablet 1 3/4/2018 at 2300   • oxyCODONE-acetaminophen (PERCOCET)  MG per tablet Take 1 tablet by mouth Every 8 (Eight) Hours As Needed for Moderate Pain . 90 tablet 0 3/5/2018 at 1300   • tamsulosin (FLOMAX) 0.4 MG capsule 24 hr capsule Take 1 capsule by mouth Daily. 90 capsule 1 3/4/2018 at 2300   • TiZANidine (ZANAFLEX) 4 MG capsule Take 1 capsule by mouth 3 (Three) Times a Day. (Patient taking differently: Take 4 mg by mouth 2 (Two) Times a Day As Needed.) 90 capsule 4 Past Month at Unknown time   , Scheduled Meds:    allopurinol 300 mg Oral Daily   amLODIPine 5 mg Oral Daily   aspirin 325 mg Oral Daily   ceFAZolin 2 g Intravenous Q8H   ferrous sulfate 325 mg Oral Daily With Breakfast   gabapentin 300 mg Oral 4x Daily   insulin aspart 0-7 Units Subcutaneous 4x Daily With Meals & Nightly   ketorolac 30 mg Intravenous Q6H   lisinopril 10 mg Oral Daily   tamsulosin 0.4 mg Oral Daily   , Continuous Infusions:    lactated ringers 100 mL/hr Last Rate: 100 mL/hr (03/05/18 2242)   , PRN Meds:  •  acetaminophen **OR** acetaminophen **OR** acetaminophen  •  acetaminophen  •  bisacodyl  •  bisacodyl  •  dextrose  •  dextrose  •  glucagon (human recombinant)  •  HYDROmorphone **AND** naloxone  •  magnesium hydroxide  •  ondansetron **OR** ondansetron ODT **OR** ondansetron  •  oxyCODONE-acetaminophen  •  oxyCODONE-acetaminophen  •  sodium chloride and Allergies:  Fexofenadine-pseudoephed er    Objective      Vital Signs  Temp:  [97.8 °F (36.6 °C)-98 °F (36.7 °C)] 98 °F (36.7 °C)  Heart Rate:  [54-82] 68  Resp:  [16-20] 16  BP: (118-156)/(64-94) 121/64    Physical Exam   Constitutional: He appears well-developed  and well-nourished. No distress.   HENT:   Head: Normocephalic and atraumatic.   Nose: Nose normal.   Mouth/Throat: Oropharynx is clear and moist. No oropharyngeal exudate.   Eyes: Conjunctivae and EOM are normal. No scleral icterus.   Neck: No JVD present. No tracheal deviation present. No thyromegaly present.   Cardiovascular: Normal rate, regular rhythm and normal heart sounds.  Exam reveals no gallop and no friction rub.    No murmur heard.  Pulmonary/Chest: Effort normal and breath sounds normal. No stridor. No respiratory distress. He has no wheezes. He has no rales.   Abdominal: Soft. Bowel sounds are normal. He exhibits no distension and no mass. There is no tenderness. There is no rebound and no guarding.   Musculoskeletal: He exhibits no tenderness or deformity.   Left knee is wrapped   Lymphadenopathy:     He has no cervical adenopathy.   Neurological: He is alert. No cranial nerve deficit.   Skin: Skin is warm and dry. No rash noted. He is not diaphoretic.   Psychiatric: He has a normal mood and affect. His behavior is normal.       Results Review:    I reviewed the patient's new clinical results.  I reviewed the patient's new imaging results and agree with the interpretation.  I personally viewed and interpreted the patient's EKG/Telemetry data        Assessment/Plan     Principal Problem:    Diabetes mellitus type 2, uncontrolled  Active Problems:    Diabetic peripheral neuropathy    Essential (primary) hypertension    HLD (hyperlipidemia)    DJD (degenerative joint disease) of knee      Assessment & Plan      Diabetes mellitus type 2, uncontrolled  -Blood sugars are currently good  -Hold the patient's metformin while in hospital, restarted on discharge  -A1c in January was good at 6.5  -Sliding-scale insulin      Diabetic peripheral neuropathy      Essential (primary) hypertension  -Norvasc and lisinopril  -BPs are doing well      HLD (hyperlipidemia)      DJD (degenerative joint disease) of knee    I  discussed the patients findings and my recommendations with patient and consulting provider    Damien Rodrigez MD  03/05/18  11:38 PM    Time:

## 2018-03-06 NOTE — PLAN OF CARE
Problem: Patient Care Overview (Adult)  Goal: Plan of Care Review   03/06/18 0917   Coping/Psychosocial Response Interventions   Plan Of Care Reviewed With patient   Outcome Evaluation   Outcome Summary/Follow up Plan Pt. currently independent with functional mobility and has met all inpt. P.T. goals. Pt. does not require skilled inpt. P.T. Will sign off. Nursing (Mayra) notified.

## 2018-03-06 NOTE — PROGRESS NOTES
Discharge Planning Assessment  [facility]     Patient Name: Flaco Miles  MRN: [id number]  Today's Date: 3/6/2018    Admit Date: 3/5/2018          Discharge Needs Assessment       03/06/18 0820    Living Environment    Lives With spouse    Living Arrangements house    Home Accessibility no concerns    Stair Railings at Home none    Type of Financial/Environmental Concern none    Transportation Available car;family or friend will provide    Living Environment    Quality Of Family Relationships involved    Able to Return to Prior Living Arrangements yes    Discharge Needs Assessment    Concerns To Be Addressed denies needs/concerns at this time;no discharge needs identified    Readmission Within The Last 30 Days no previous admission in last 30 days    Discharge Facility/Level Of Care Needs home with home health            Discharge Plan       03/06/18 0821    Case Management/Social Work Plan    Plan Home w/ spouse and [facility].     Patient/Family In Agreement With Plan yes    Additional Comments S/w pt by phone, verified facesheet and d/c plan. Pt plans to d/c home w/ the help of [id number]     Final Note    Final Note D/c home w/ [facility], ALEKSANDAR for Yas 8058.         Discharge Placement     Facility/Agency Request Status Selected? Address Phone Number Fax Number    [facility] Accepted    Yes [address] [address] [phone]        Tatyana Penaloza RN 3/6/2018 08:18    3/6/2018  Yas concepcionied [id number].                    Expected Discharge Date and Time     Expected Discharge Date Expected Discharge Time    Mar 6, 2018               Demographic Summary     None            Functional Status     None            Psychosocial     None            Abuse/Neglect     None            Legal     None            Substance Abuse     None            Patient Forms       03/06/18 0818    Patient Forms    Provider Choice List  Delivered    Delivered to Patient    Method of delivery Telephone          Tatyana Penaloza RN

## 2018-03-06 NOTE — THERAPY DISCHARGE NOTE
Acute Care - Physical Therapy Initial Eval/Discharge  [facility]     Patient Name: Flaco Miles  : 1965  MRN: [id number]  Today's Date: 3/6/2018   Onset of Illness/Injury or Date of Surgery Date: 18  Date of Referral to PT: 18  Referring Physician: Vinod Bautista      Admit Date: 3/5/2018    Visit Dx:    ICD-10-CM ICD-9-CM   1. Uncontrolled type 2 diabetes mellitus without complication, without long-term current use of insulin E11.65 250.02     Patient Active Problem List   Diagnosis   • Chronic pain   • Diabetes mellitus type 2, uncontrolled   • Diabetic peripheral neuropathy   • Essential (primary) hypertension   • HLD (hyperlipidemia)   • Gout   • Diabetes mellitus type 2, noninsulin dependent   • Pain of both wrist joints   • Swollen scrotum   • Pain of right hand   • Chronic nonintractable headache   • Vitamin B12 deficiency   • Prostate cancer screening   • Chronic intractable headache   • Headache   • Chronic fatigue   • Allergic conjunctivitis   • Thumb pain   • Abnormal liver enzymes   • Chronic pain of both knees   • DJD (degenerative joint disease) of knee     Past Medical History:   Diagnosis Date   • Anesthesia complication     WAKES VIOLENTLY.  UNKNOWN REASON   • DDD (degenerative disc disease), cervical    • Diabetes mellitus    • DJD (degenerative joint disease)    • Gout    • History of kidney stones    • Hyperlipidemia    • Hypertension    • Left knee pain    • Migraines    • Sleep apnea     NO MACHINE   • Sleep disorder      Past Surgical History:   Procedure Laterality Date   • ADENOIDECTOMY     • ANKLE ARTHROPLASTY Left    • BACK SURGERY     • CERVICAL FUSION     • CHOLECYSTECTOMY     • ELBOW ARTHROPLASTY Right    • GALLBLADDER SURGERY     • HERNIA REPAIR     • KNEE ARTHROPLASTY Left    • LEG SURGERY      from spider bites   • NECK SURGERY      c1-c6 plate and cage   • SHOULDER ARTHROSCOPY Right    • TESTICLE SURGERY     • TONSILLECTOMY            PT ASSESSMENT  "(last 72 hours)      PT Evaluation       03/06/18 0913 03/06/18 0820    Rehab Evaluation    Document Type discharge evaluation/summary  -MS     Subjective Information agree to therapy;complains of;pain  -MS     Patient Effort, Rehab Treatment excellent  -MS     Symptoms Noted Comment Pt. reports pain in his Left knee this AM but otherwise he is \"ready to go home\"  -MS     General Information    Onset of Illness/Injury or Date of Surgery Date 03/05/18  -MS     Referring Physician Vinod Bautista  -MS     Pertinent History Of Current Problem Pt. s/p Left TKR  -MS     Prior Level of Function independent:  -MS     Equipment Currently Used at Home none  -MS     Plans/Goals Discussed With patient;agreed upon  -MS     Living Environment    Lives With  spouse  -[facility]    Living Arrangements  house  -[facility]    Home Accessibility  no concerns  -[facility]    Stair Railings at Home  none  -[facility]    Type of Financial/Environmental Concern  none  -[facility]    Transportation Available  car;family or friend will provide  -[facility]    Clinical Impression    Date of Referral to PT 03/05/18  -MS     Criteria for Skilled Therapeutic Interventions Met no problems identified which require skilled intervention  -MS     Pain Assessment    Pain Assessment 0-10  -MS     Pain Score 6  -MS     Post Pain Score 6  -MS     Pain Type Acute pain;Surgical pain  -MS     Pain Location Knee  -MS     Pain Orientation Left  -MS     Pain Intervention(s) Medication (See MAR);Cold applied;Repositioned;Elevated;Rest  -MS     Cognitive Assessment/Intervention    Current Cognitive/Communication Assessment functional  -MS     Orientation Status oriented x 4  -MS     Follows Commands/Answers Questions 100% of the time  -MS     Personal Safety WNL/WFL  -MS     Personal Safety Interventions fall prevention program maintained;gait belt;nonskid shoes/slippers when out of bed;supervised activity  -MS     ROM (Range of Motion)    General ROM --   BUE/RLE (WFL's)  -MS     General ROM Detail Left " knee AROM (5, 81)  -MS     MMT (Manual Muscle Testing)    General MMT Assessment --   BUE/RLE (4+/5)  -MS     Mobility Assessment/Training    Extremity Weight-Bearing Status left lower extremity  -MS     Left Lower Extremity Weight-Bearing weight-bearing as tolerated  -MS     Bed Mobility, Assessment/Treatment    Bed Mob, Supine to Sit, [place] independent  -MS     Bed Mob, Sit to Supine, [place] independent  -MS     Transfer Assessment/Treatment    Transfers, Sit-Stand [place] independent  -MS     Transfers, Stand-Sit [place] independent  -MS     Transfers, Sit-Stand-Sit, Assist Device straight cane  -MS     Gait Assessment/Treatment    Gait, [place] Level independent  -MS     Gait, Assistive Device straight cane  -MS     Gait, Distance (Feet) 200  -MS     Gait, Comment No balance deficits noted  -MS     Stairs Assessment/Treatment    Number of Stairs 10  -MS     Stairs, Handrail Location left side (ascending)   Cane in Right hand  -MS     Stairs, [place] Level supervision required  -MS     Therapy Exercises    Exercise Protocols total knee  -MS     Total Knee Exercises left:;10 reps;completed protocol  -MS     Positioning and Restraints    Pre-Treatment Position sitting in chair/recliner  -MS     Post Treatment Position chair  -MS     In Chair notified nsg;reclined;sitting;call light within reach;encouraged to call for assist   Ice pack to Left knee.  -MS       03/05/18 1820 03/05/18 1317    General Information    Equipment Currently Used at Home none  -KM cane, straight;glucometer;shower chair  -    Living Environment    Lives With  spouse  -    Living Arrangements  house  -    Home Accessibility  stairs to enter home;no concerns;bed and bath on same level  -    Number of Stairs to Enter Home  3  -SM    Stair Railings at Home  none  -SM    Type of Financial/Environmental Concern  none  -SM    Transportation Available  car;family or friend will provide  -      User Key  (r)  = Recorded By, (t) = Taken By, (c) = Cosigned By    Initials Name Provider Type    [place] Tatyana Penaloza, RN Case Manager     Nathalie Hunt, RN Registered Nurse    MS Keith Rosa, PT Physical Therapist    WHITNEY Keith, RN Registered Nurse          Physical Therapy Education     Title: PT OT SLP Therapies (Resolved)     Topic: Physical Therapy (Resolved)     Point: Mobility training (Resolved)    Learning Progress Summary    Learner Readiness Method Response Comment Documented by Status   Patient Acceptance E,FLORI CUNNINGHAM,LAVERN  MS 03/06/18 0917 Done               Point: Home exercise program (Resolved)    Learning Progress Summary    Learner Readiness Method Response Comment Documented by Status   Patient Acceptance E,FLORI CUNNINGHAM,VU  MS 03/06/18 0917 Done               Point: Body mechanics (Resolved)    Learning Progress Summary    Learner Readiness Method Response Comment Documented by Status   Patient Acceptance FLORI STONE,LAVERN  MS 03/06/18 0917 Done               Point: Precautions (Resolved)    Learning Progress Summary    Learner Readiness Method Response Comment Documented by Status   Patient Acceptance FLROI STONE,LAVERN  MS 03/06/18 0917 Done                      User Key     Initials Effective Dates Name Provider Type Discipline    MS 12/01/15 -  Keith Rosa, PT Physical Therapist PT                PT Recommendation and Plan  Anticipated Equipment Needs At Discharge:  (Pt. already has a cane for use at home)  Anticipated Discharge Disposition: home with assist, home with home health  Plan of Care Review  Plan Of Care Reviewed With: patient  Outcome Summary/Follow up Plan: Pt. currently independent with functional mobility and has met all inpt. P.T. goals. Pt. does not require skilled inpt. P.T.  Will sign off. Nursing (Mayra) notified.              Outcome Measures       03/06/18 0900          How much help from another person do you currently need...    Turning from your back to your side while in flat bed without  using bedrails? 4  -MS      Moving from lying on back to sitting on the side of a flat bed without bedrails? 4  -MS      Moving to and from a bed to a chair (including a wheelchair)? 4  -MS      Standing up from a chair using your arms (e.g., wheelchair, bedside chair)? 4  -MS      Climbing 3-5 steps with a railing? 4  -MS      To walk in hospital room? 4  -MS      AM-PAC 6 Clicks Score 24  -MS      Functional Assessment    Outcome Measure Options AM-PAC 6 Clicks Basic Mobility (PT)  -MS        User Key  (r) = Recorded By, (t) = Taken By, (c) = Cosigned By    Initials Name Provider Type    MS Keith Rosa PT Physical Therapist           Time Calculation:         PT Charges       03/06/18 0918          Time Calculation    Start Time 0905  -MS      Stop Time 0918  -MS      Time Calculation (min) 13 min  -MS      PT Received On 03/06/18  -MS        User Key  (r) = Recorded By, (t) = Taken By, (c) = Cosigned By    Initials Name Provider Type    MS Keith Rosa PT Physical Therapist          Therapy Charges for Today     Code Description Service Date Service Provider Modifiers Qty    74111243819 HC PT MOBILITY CURRENT 3/6/2018 Keith Rosa, PT GP, CH 1    66978528881 HC PT MOBILITY PROJECTED 3/6/2018 Keith Rosa, PT GP,  1    85243224439 HC PT MOBILITY DISCHARGE 3/6/2018 Keith Rosa, PT GP,  1    61552134971 HC PT EVAL LOW COMPLEXITY 1 3/6/2018 Keith Rosa, PT GP 1          PT G-Codes  PT Professional Judgement Used?: Yes  Outcome Measure Options: AM-PAC 6 Clicks Basic Mobility (PT)  Functional Limitation: Mobility: Walking and moving around  Mobility: Walking and Moving Around Current Status (): 0 percent impaired, limited or restricted  Mobility: Walking and Moving Around Goal Status (): 0 percent impaired, limited or restricted  Mobility: Walking and Moving Around Discharge Status (): 0 percent impaired, limited or restricted    PT Discharge Summary  Anticipated  Discharge Disposition: home with assist, home with home health  Reason for Discharge: All goals achieved, Independent  Outcomes Achieved: Refer to plan of care for updates on goals achieved  Discharge Destination: Home with assist, Home with home health    Keith Rosa, PT  3/6/2018

## 2018-03-06 NOTE — PLAN OF CARE
Problem: Patient Care Overview (Adult)  Goal: Plan of Care Review   03/06/18 1242   Outcome Evaluation   Outcome Summary/Follow up Plan Patient with dx of DM-reviewed the need to monitor BS and take medications as ordered

## 2018-03-06 NOTE — PLAN OF CARE
Problem: Patient Care Overview (Adult)  Goal: Plan of Care Review  Outcome: Ongoing (interventions implemented as appropriate)   03/06/18 0404   Coping/Psychosocial Response Interventions   Plan Of Care Reviewed With patient   Patient Care Overview   Progress improving   Outcome Evaluation   Outcome Summary/Follow up Plan Pt has done well through the night. Up to chair x1 and ambulating around unit with walker use and staff assistance. Voiding adequately. Pain well controlled with PO pain meds. Education provided on glucose monitoring and insulin therapy.      Goal: Adult Individualization and Mutuality  Outcome: Ongoing (interventions implemented as appropriate)    Goal: Discharge Needs Assessment  Outcome: Ongoing (interventions implemented as appropriate)      Problem: Perioperative Period (Adult)  Goal: Signs and Symptoms of Listed Potential Problems Will be Absent or Manageable (Perioperative Period)  Outcome: Ongoing (interventions implemented as appropriate)   03/06/18 0404   Perioperative Period   Problems Assessed (Perioperative Period) pain;hypoxia/hypoxemia   Problems Present (Perioperative Period) pain       Problem: Knee Replacement, Total (Adult)  Goal: Signs and Symptoms of Listed Potential Problems Will be Absent or Manageable (Knee Replacement, Total)  Outcome: Ongoing (interventions implemented as appropriate)   03/06/18 0404   Knee Replacement, Total   Problems Assessed (Total Knee Replacement) decreased range of motion;neurovascular impairment;functional decline/self care deficit;pain   Problems Present (Total Knee Replacement) pain;decreased range of motion       Problem: Fall Risk (Adult)  Goal: Identify Related Risk Factors and Signs and Symptoms  Outcome: Outcome(s) achieved Date Met: 03/06/18 03/06/18 0404   Fall Risk   Fall Risk: Related Risk Factors gait/mobility problems;environment unfamiliar   Fall Risk: Signs and Symptoms presence of risk factors     Goal: Absence of Falls  Outcome:  Ongoing (interventions implemented as appropriate)   03/06/18 0409   Fall Risk (Adult)   Absence of Falls achieves outcome

## 2018-03-06 NOTE — DISCHARGE SUMMARY
Discharge Summary   Vinod Crooks M.D.    NAME: Flaco Miles ADMIT: 3/5/2018   : 1965  PCP: Hayden Morgan MD    MRN: [id number] LOS: 0 days   AGE/SEX: 52 y.o. male  ROOM: 83       Date of Discharge:  3/6/18    Primary Discharge Diagnosis:  DJD (degenerative joint disease) of knee [M17.10]    Secondary Discharge Diagnosis:    Problem List Items Addressed This Visit     * (Principal)Diabetes mellitus type 2, uncontrolled - Primary    Relevant Orders    Referral to home health          Procedures Performed:  Left Total Knee Arthroplasty    Hospital Course:    Flaco Miles is a 52 y.o.  male who underwent successful Left Total Knee Arthroplasty on 3/5/2018.  Flaco Miles was started on Aspirin 325mg po daily immediately post-operatively for DVT prophylaxis.  On post-op day 1 the patients dressing was changed, drain removed and their incision was clean, with no signs of infection and their calf was soft, with no signs of DVT.  The patient progressed well with physical therapy and the patients hemoglobin remained stable. On post-operative day 1 the patient was felt ready for discharge.      Total Knee Joint Replacement Discharge Instructions:    I. ACTIVITIES:    1. Exercises:  ? Complete exercise program as taught by the hospital physical therapist 2 times per day  ? Exercise program will be advanced by the physical therapist  ? During the day be up ambulating every 2 hours (while awake) for short distances  ? Complete the ankle pump exercises at least 10 times per hour (while awake)  ? Elevate legs most of the day the first week post operatively and thereafter elevate legs when in bed and for at least 30 minutes during the day. Caution must be taken to avoid pillow placement under the bend of the knee as this can led to flexion contractures of the knee.  ? Use cold packs 20-30 minutes approximately 5 times per day. This should be done before and after completing your exercises and at any  time you are experiencing pain/ stiffness in your operative extremity.    2. Activities of Daily Living:  ? No tub baths, hot tubs, or swimming pools for 4 weeks  ? May shower and let water run over the incision on post-operative day #7 if no drainage. Do not scrub or rub the incision. Simply let the water run over the incision and pat dry.    II. Precautions:  ? Everyone that comes near you should wash their hands  ? No elective dental, genital-urinary, or colon procedures or surgical procedures for 12 weeks after surgery unless absolutely necessary.  ? If dental work or surgical procedure is deemed absolutely necessary during the first 12 weeks, you will need to contact your surgeon as you will need to take antibiotics 1 hour prior to any dental work (including teeth cleanings).  ? Please discuss with your surgeon prophylactic antibiotics as the length of time this intervention will be necessary for you varies with each patient’s health history and situation.  ? Avoid sick people. If you must be around someone who is ill, they should wear a mask.  ? Avoid visits to the Emergency Room or Urgent Care unless you are having a life threatening event.     III. INCISION CARE:  ? Wash your hands prior to dressing changes  ? Change the dressing as needed to keep incision clean and dry. Utilize dry gauze and paper tape. Avoid touching the side of the gauze that goes against the incision with your hands.  ? No creams or ointments to the incision  ? May remove dressing once the incision is free of drainage  ? Do not touch or pick at the incision  ? Check incision every day and notify surgeon immediately if any of the following signs or symptoms are noted:  o Increase in redness  o Increase in swelling around the incision and of the entire extremity  o Increase in pain  o Drainage oozing from the incision  o Pulling apart of the edges of the incision  o Increase in overall body temperature (greater than 100.5 degrees)  ? Your  surgeon will instruct you regarding suture or staple removal    IV. Medications:     1. Anticoagulants: You will be discharged on an anticoagulant. This is a prophylactic medication that helps prevent blood clots during your post-operative period. The type and length of dosage varies based on your individual needs, procedure performed, and surgeon’s preference.    ? While taking the anticoagulant, you should avoid taking any additional aspirin, ibuprofen (Advil or Motrin), Aleve (Naprosyn) or other non-steroidal anti-inflammatory medications.   ? Notify surgeon immediately if any denae bleeding is noted in the urine, stool, emesis, or from the nose or the incision. Blood in the stool will often appear as black rather than red. Blood in urine may appear as pink. Blood in emesis may appear as brown/black like coffee grounds.  ? You will need to apply pressure for longer periods of time to any cuts or abrasions to stop bleeding  ? Avoid alcohol while taking anticoagulants    2. Stool Softeners: You will be at greater risk of constipation after surgery due to being less mobile and the pain medications.     ? Take stool softeners as instructed by your surgeon while on pain medications. Bran cereal is most effective. Over the counter Colace 100 mg 1-2 capsules twice daily.   ? Drink plenty of fluids, and eat fruits and vegetables during your recovery time    3. Pain Medications utilized after surgery are narcotics and the law requires that the following information be given to all patients that are prescribed narcotics:    ? CLASSIFICATION: Pain medications are called Opioids and are narcotics  ? LEGALITIES: It is illegal to share narcotics with others and to drive within 24 hours of taking narcotics  ? POTENTIAL SIDE EFFECTS: Potential side effects of opioids include: nausea, vomiting, itching, dizziness, drowsiness, dry mouth, constipation, and difficulty urinating.  ? POTENTIAL ADVERSE EFFECTS:   o Opioid tolerance can  develop with use of pain medications and this simply means that it requires more and more of the medication to control pain; however, this is seen more in patients that use opioids for longer periods of time.  o Opioid dependence can develop with use of Opioids and this simply means that to stop the medication can cause withdrawal symptoms; however, this is seen with patients that use Opioids for longer periods of time.  o Opioid addiction can develop with use of Opioids and the incidence of this is very unlikely in patients who take the medications as ordered and stop the medications as instructed.  o Opioid overdose can be dangerous, but is unlikely when the medication is taken as ordered and stopped when ordered. It is important not to mix opioids with alcohol or with and type of sedative such as Benadryl as this can lead to over sedation and respiratory difficulty.  ? DOSAGE:   o Pain medications will need to be taken consistently for the first week to decrease pain and promote adequate pain relief and participation in physical therapy.  o After the initial surgical pain begins to resolve, you may begin to decrease the pain medication. By the end of 6-8 weeks, you should be off of pain medications.  o Refills will not be given by the office during evening hours, on weekends, or after 6-8 weeks post-op.  o To seek refills on pain medications during the initial 6 week post-operative period, you must call the office 48 hours in advance to request the refill. The office will then notify you when to  the prescription. DO NOT wait until you are out of the medication to request a refill.    V. FOLLOW-UP VISITS:  ? You will need to follow up in the office with your surgeon in 3 weeks. Please call this number [phone] to schedule this appointment.  If you have any concerns or suspected complications prior to your follow up visit, please call your surgeons office. Do not wait until your appointment time if you  suspect complications. These will need to be addressed in the office promptly.    Discharge Medications:     1) Percocet 10/325  1-2 po q 4-6 hours for pain control which he gets from his pain management team  2)  Aspirin 325 mg po daily for 6 weeks.      Vinod Crooks MD  3/6/2018  7:00 AM

## 2018-03-06 NOTE — PLAN OF CARE
Problem: Patient Care Overview (Adult)  Goal: Plan of Care Review  Outcome: Outcome(s) achieved Date Met: 03/06/18 03/06/18 0404 03/06/18 0917 03/06/18 1216   Coping/Psychosocial Response Interventions   Plan Of Care Reviewed With --  patient --    Patient Care Overview   Progress improving --  --    Outcome Evaluation   Outcome Summary/Follow up Plan --  --  POD 1 LTKA-amb with cane and stand by assist, pain controlled on oral medications, Discharge instructions completed, written material provided to patient. Denies questions     Goal: Adult Individualization and Mutuality  Outcome: Outcome(s) achieved Date Met: 03/06/18    Goal: Discharge Needs Assessment  Outcome: Outcome(s) achieved Date Met: 03/06/18      Problem: Perioperative Period (Adult)  Goal: Signs and Symptoms of Listed Potential Problems Will be Absent or Manageable (Perioperative Period)  Outcome: Outcome(s) achieved Date Met: 03/06/18      Problem: Knee Replacement, Total (Adult)  Goal: Signs and Symptoms of Listed Potential Problems Will be Absent or Manageable (Knee Replacement, Total)  Outcome: Outcome(s) achieved Date Met: 03/06/18      Problem: Fall Risk (Adult)  Goal: Absence of Falls  Outcome: Outcome(s) achieved Date Met: 03/06/18

## 2018-03-06 NOTE — PROGRESS NOTES
"/63 (BP Location: Left arm, Patient Position: Lying)  Pulse 66  Temp 97.2 °F (36.2 °C) (Oral)   Resp 16  Ht 180.3 cm (71\")  Wt 110 kg (243 lb 1 oz)  SpO2 92%  BMI 33.9 kg/m2    Lab Results (last 24 hours)     Procedure Component Value Units Date/Time    POC Glucose Once [[id number]]  (Normal) Collected:  03/05/18 1232    Specimen:  Blood Updated:  03/05/18 1235     Glucose 118 mg/dL     Narrative:       Meter: [id number] : [id number] Liz LOPEZ    POC Glucose Once [[id number]]  (Abnormal) Collected:  03/05/18 2109    Specimen:  Blood Updated:  03/05/18 2110     Glucose 254 (H) mg/dL     Narrative:       Meter: [id number] : [id number] Dozier Ashley    Hemoglobin & Hematocrit, Blood [[id number]]  (Abnormal) Collected:  03/06/18 0352    Specimen:  Blood Updated:  03/06/18 0524     Hemoglobin 13.0 (L) g/dL      Hematocrit 39.3 (L) %     Basic Metabolic Panel [[id number]]  (Abnormal) Collected:  03/06/18 0352    Specimen:  Blood Updated:  03/06/18 0541     Glucose 179 (H) mg/dL      BUN 20 mg/dL      Creatinine 0.87 mg/dL      Sodium 134 (L) mmol/L      Potassium 4.7 mmol/L      Chloride 97 (L) mmol/L      CO2 23.8 mmol/L      Calcium 8.8 mg/dL      eGFR Non African Amer 93 mL/min/1.73      BUN/Creatinine Ratio 23.0     Anion Gap 13.2 mmol/L     Narrative:       GFR Normal >60  Chronic Kidney Disease <60  Kidney Failure <15    POC Glucose Once [[id number]]  (Abnormal) Collected:  03/06/18 0605    Specimen:  Blood Updated:  03/06/18 0606     Glucose 200 (H) mg/dL     Narrative:       Meter: [id number] : [id number] Dozier Luli          Imaging Results (last 24 hours)     Procedure Component Value Units Date/Time    XR Knee 1 or 2 View Left [[id number]] Collected:  03/05/18 1720     Updated:  03/05/18 1809    Narrative:       2 VIEW PORTABLE LEFT KNEE     HISTORY: Knee replacement for osteoarthritis.     FINDINGS: The patient has had recent total knee replacement and the  alignment appears " satisfactory.     This report was finalized on 3/5/2018 6:06 PM by Dr. Carlos Aguila MD.             Patient Care Team:  Hayden Morgan MD as PCP - General (Internal Medicine)  Hayden Morgan MD as PCP - Claims Attributed    SUBJECTIVE  comfortable  PHYSICAL EXAM   NV intact  Principal Problem:    Diabetes mellitus type 2, uncontrolled  Active Problems:    Diabetic peripheral neuropathy    Essential (primary) hypertension    HLD (hyperlipidemia)    DJD (degenerative joint disease) of knee      PLAN / DISPOSITION:  Arixtra one time dose  HH discharge today  Vinod Crooks MD  03/06/18  6:53 AM

## 2018-03-15 RX ORDER — LISINOPRIL 10 MG/1
10 TABLET ORAL DAILY
Qty: 90 TABLET | Refills: 1 | Status: SHIPPED | OUTPATIENT
Start: 2018-03-15 | End: 2018-03-22 | Stop reason: SDUPTHER

## 2018-03-15 RX ORDER — GABAPENTIN 300 MG/1
300 CAPSULE ORAL 4 TIMES DAILY
Qty: 120 CAPSULE | Refills: 2 | Status: SHIPPED | OUTPATIENT
Start: 2018-03-15 | End: 2018-03-22 | Stop reason: SDUPTHER

## 2018-03-15 RX ORDER — ALLOPURINOL 300 MG/1
300 TABLET ORAL DAILY
Qty: 90 TABLET | Refills: 1 | Status: SHIPPED | OUTPATIENT
Start: 2018-03-15 | End: 2018-03-22 | Stop reason: SDUPTHER

## 2018-03-15 RX ORDER — ATORVASTATIN CALCIUM 20 MG/1
20 TABLET, FILM COATED ORAL DAILY
Qty: 90 TABLET | Refills: 1 | Status: SHIPPED | OUTPATIENT
Start: 2018-03-15 | End: 2018-03-22 | Stop reason: SDUPTHER

## 2018-03-15 RX ORDER — AMLODIPINE BESYLATE 5 MG/1
5 TABLET ORAL DAILY
Qty: 90 TABLET | Refills: 1 | Status: SHIPPED | OUTPATIENT
Start: 2018-03-15 | End: 2018-03-22 | Stop reason: SDUPTHER

## 2018-03-22 ENCOUNTER — OFFICE VISIT (OUTPATIENT)
Dept: FAMILY MEDICINE CLINIC | Facility: CLINIC | Age: 53
End: 2018-03-22

## 2018-03-22 VITALS
WEIGHT: 249 LBS | OXYGEN SATURATION: 93 % | HEART RATE: 67 BPM | HEIGHT: 71 IN | SYSTOLIC BLOOD PRESSURE: 112 MMHG | BODY MASS INDEX: 34.86 KG/M2 | DIASTOLIC BLOOD PRESSURE: 58 MMHG | TEMPERATURE: 98.4 F

## 2018-03-22 DIAGNOSIS — E78.00 PURE HYPERCHOLESTEROLEMIA: ICD-10-CM

## 2018-03-22 DIAGNOSIS — M25.562 CHRONIC PAIN OF BOTH KNEES: Primary | ICD-10-CM

## 2018-03-22 DIAGNOSIS — M25.561 CHRONIC PAIN OF BOTH KNEES: Primary | ICD-10-CM

## 2018-03-22 DIAGNOSIS — I10 ESSENTIAL (PRIMARY) HYPERTENSION: ICD-10-CM

## 2018-03-22 DIAGNOSIS — M17.2 POST-TRAUMATIC OSTEOARTHRITIS OF BOTH KNEES: ICD-10-CM

## 2018-03-22 DIAGNOSIS — E11.9 DIABETES MELLITUS TYPE 2, NONINSULIN DEPENDENT (HCC): ICD-10-CM

## 2018-03-22 DIAGNOSIS — G89.29 CHRONIC PAIN OF BOTH KNEES: Primary | ICD-10-CM

## 2018-03-22 PROCEDURE — 99214 OFFICE O/P EST MOD 30 MIN: CPT | Performed by: INTERNAL MEDICINE

## 2018-03-22 RX ORDER — TAMSULOSIN HYDROCHLORIDE 0.4 MG/1
1 CAPSULE ORAL DAILY
Qty: 90 CAPSULE | Refills: 1 | Status: SHIPPED | OUTPATIENT
Start: 2018-03-22 | End: 2018-10-03 | Stop reason: SDUPTHER

## 2018-03-22 RX ORDER — ATORVASTATIN CALCIUM 20 MG/1
20 TABLET, FILM COATED ORAL DAILY
Qty: 90 TABLET | Refills: 1 | Status: SHIPPED | OUTPATIENT
Start: 2018-03-22 | End: 2018-10-03 | Stop reason: SDUPTHER

## 2018-03-22 RX ORDER — LISINOPRIL 10 MG/1
10 TABLET ORAL DAILY
Qty: 90 TABLET | Refills: 1 | Status: SHIPPED | OUTPATIENT
Start: 2018-03-22 | End: 2018-10-03 | Stop reason: SDUPTHER

## 2018-03-22 RX ORDER — OXYCODONE AND ACETAMINOPHEN 10; 325 MG/1; MG/1
1 TABLET ORAL EVERY 8 HOURS PRN
Qty: 90 TABLET | Refills: 0 | Status: SHIPPED | OUTPATIENT
Start: 2018-03-22 | End: 2018-05-18 | Stop reason: SDUPTHER

## 2018-03-22 RX ORDER — AMLODIPINE BESYLATE 5 MG/1
5 TABLET ORAL DAILY
Qty: 90 TABLET | Refills: 1 | Status: SHIPPED | OUTPATIENT
Start: 2018-03-22 | End: 2018-04-13

## 2018-03-22 RX ORDER — ALLOPURINOL 300 MG/1
300 TABLET ORAL DAILY
Qty: 90 TABLET | Refills: 1 | Status: SHIPPED | OUTPATIENT
Start: 2018-03-22 | End: 2018-04-24 | Stop reason: SDUPTHER

## 2018-03-22 RX ORDER — GABAPENTIN 300 MG/1
300 CAPSULE ORAL 4 TIMES DAILY
Qty: 360 CAPSULE | Refills: 0 | Status: SHIPPED | OUTPATIENT
Start: 2018-03-22 | End: 2018-04-24 | Stop reason: SDUPTHER

## 2018-03-22 NOTE — PROGRESS NOTES
"Subjective   Patient ID: Flaco Miles is a 52 y.o. male presents with   Chief Complaint   Patient presents with   • Med Refill       HPI - This patient presents today with follow-up for chronic pain syndrome and chronic knee pain he recently had a total knee replacement of the left knee.  Also he has hypertension diabetes and hypercholesterolemia he needs refills of his medicines.    Assessment plan    Chronic knee pain recent total knee replacement-oxycodone 10/325.  This medicine helps with his quality of life he has no adverse effects he is adherent to regimen Bandar reports obtaining narcotic agreement was signed    BPH tamsulosin    Hyperlipidemia Lipitor 20    Neuropathy-gabapentin    Hypertension-continue lisinopril    Allergies   Allergen Reactions   • Fexofenadine-Pseudoephed Er Other (See Comments)     TACHYCARDIA       The following portions of the patient's history were reviewed and updated as appropriate: allergies, current medications, past family history, past medical history, past social history, past surgical history and problem list.      Review of Systems   Constitutional: Negative.    Respiratory: Negative.    Cardiovascular: Negative.    Musculoskeletal: Positive for arthralgias and joint swelling.   Psychiatric/Behavioral: Negative.        Objective     Vitals:    03/22/18 1342   BP: 112/58   BP Location: Left arm   Patient Position: Sitting   Cuff Size: Adult   Pulse: 67   Temp: 98.4 °F (36.9 °C)   TempSrc: Oral   SpO2: 93%   Weight: 113 kg (249 lb)   Height: 180.3 cm (71\")         Physical Exam   Constitutional: He appears well-developed and well-nourished.   Cardiovascular: Normal rate, regular rhythm and normal heart sounds.    Pulmonary/Chest: Effort normal and breath sounds normal.   Musculoskeletal: He exhibits no edema.   Psychiatric: He has a normal mood and affect. His behavior is normal.   Nursing note and vitals reviewed.        Flaco was seen today for med " refill.    Diagnoses and all orders for this visit:    Chronic pain of both knees  -     Ambulatory Referral to Pain Management    Post-traumatic osteoarthritis of both knees  -     Ambulatory Referral to Pain Management    Essential (primary) hypertension    Pure hypercholesterolemia    Diabetes mellitus type 2, noninsulin dependent    Other orders  -     oxyCODONE-acetaminophen (PERCOCET)  MG per tablet; Take 1 tablet by mouth Every 8 (Eight) Hours As Needed for Moderate Pain .  -     metFORMIN (GLUCOPHAGE) 500 MG tablet; Take 1 tablet by mouth 2 (Two) Times a Day With Meals.  -     amLODIPine (NORVASC) 5 MG tablet; Take 1 tablet by mouth Daily.  -     allopurinol (ZYLOPRIM) 300 MG tablet; Take 1 tablet by mouth Daily.  -     gabapentin (NEURONTIN) 300 MG capsule; Take 1 capsule by mouth 4 (Four) Times a Day.  -     atorvastatin (LIPITOR) 20 MG tablet; Take 1 tablet by mouth Daily.  -     lisinopril (PRINIVIL,ZESTRIL) 10 MG tablet; Take 1 tablet by mouth Daily.  -     tamsulosin (FLOMAX) 0.4 MG capsule 24 hr capsule; Take 1 capsule by mouth Daily.        Call or return to clinic prn if these symptoms worsen or fail to improve as anticipated.

## 2018-04-13 ENCOUNTER — HOSPITAL ENCOUNTER (OUTPATIENT)
Dept: GENERAL RADIOLOGY | Facility: HOSPITAL | Age: 53
Discharge: HOME | End: 2018-04-13
Admitting: ORTHOPAEDIC SURGERY

## 2018-04-13 ENCOUNTER — APPOINTMENT (OUTPATIENT)
Dept: PREADMISSION TESTING | Facility: HOSPITAL | Age: 53
End: 2018-04-13

## 2018-04-13 VITALS
OXYGEN SATURATION: 97 % | WEIGHT: 236.2 LBS | HEART RATE: 57 BPM | BODY MASS INDEX: 33.07 KG/M2 | DIASTOLIC BLOOD PRESSURE: 74 MMHG | TEMPERATURE: 97.8 F | SYSTOLIC BLOOD PRESSURE: 128 MMHG | HEIGHT: 71 IN | RESPIRATION RATE: 20 BRPM

## 2018-04-13 LAB
ALBUMIN SERPL-MCNC: 4.3 G/DL (ref 3.5–5.2)
ALBUMIN/GLOB SERPL: 1.3 G/DL
ALP SERPL-CCNC: 85 U/L (ref 39–117)
ALT SERPL W P-5'-P-CCNC: 19 U/L (ref 1–41)
ANION GAP SERPL CALCULATED.3IONS-SCNC: 12.7 MMOL/L
AST SERPL-CCNC: 20 U/L (ref 1–40)
BASOPHILS # BLD AUTO: 0.01 10*3/MM3 (ref 0–0.2)
BASOPHILS NFR BLD AUTO: 0.2 % (ref 0–1.5)
BILIRUB SERPL-MCNC: 1.1 MG/DL (ref 0.1–1.2)
BUN BLD-MCNC: 10 MG/DL (ref 6–20)
BUN/CREAT SERPL: 13.3 (ref 7–25)
CALCIUM SPEC-SCNC: 9.1 MG/DL (ref 8.6–10.5)
CHLORIDE SERPL-SCNC: 102 MMOL/L (ref 98–107)
CO2 SERPL-SCNC: 26.3 MMOL/L (ref 22–29)
CREAT BLD-MCNC: 0.75 MG/DL (ref 0.76–1.27)
DEPRECATED RDW RBC AUTO: 49 FL (ref 37–54)
EOSINOPHIL # BLD AUTO: 0.29 10*3/MM3 (ref 0–0.7)
EOSINOPHIL NFR BLD AUTO: 5 % (ref 0.3–6.2)
ERYTHROCYTE [DISTWIDTH] IN BLOOD BY AUTOMATED COUNT: 14.6 % (ref 11.5–14.5)
GFR SERPL CREATININE-BSD FRML MDRD: 110 ML/MIN/1.73
GLOBULIN UR ELPH-MCNC: 3.2 GM/DL
GLUCOSE BLD-MCNC: 134 MG/DL (ref 65–99)
HCT VFR BLD AUTO: 41.9 % (ref 40.4–52.2)
HGB BLD-MCNC: 13.6 G/DL (ref 13.7–17.6)
IMM GRANULOCYTES # BLD: 0 10*3/MM3 (ref 0–0.03)
IMM GRANULOCYTES NFR BLD: 0 % (ref 0–0.5)
LYMPHOCYTES # BLD AUTO: 1.59 10*3/MM3 (ref 0.9–4.8)
LYMPHOCYTES NFR BLD AUTO: 27.6 % (ref 19.6–45.3)
MCH RBC QN AUTO: 29.6 PG (ref 27–32.7)
MCHC RBC AUTO-ENTMCNC: 32.5 G/DL (ref 32.6–36.4)
MCV RBC AUTO: 91.3 FL (ref 79.8–96.2)
MONOCYTES # BLD AUTO: 0.79 10*3/MM3 (ref 0.2–1.2)
MONOCYTES NFR BLD AUTO: 13.7 % (ref 5–12)
NEUTROPHILS # BLD AUTO: 3.08 10*3/MM3 (ref 1.9–8.1)
NEUTROPHILS NFR BLD AUTO: 53.5 % (ref 42.7–76)
PLATELET # BLD AUTO: 203 10*3/MM3 (ref 140–500)
PMV BLD AUTO: 8.8 FL (ref 6–12)
POTASSIUM BLD-SCNC: 4.2 MMOL/L (ref 3.5–5.2)
PROT SERPL-MCNC: 7.5 G/DL (ref 6–8.5)
RBC # BLD AUTO: 4.59 10*6/MM3 (ref 4.6–6)
SODIUM BLD-SCNC: 141 MMOL/L (ref 136–145)
WBC NRBC COR # BLD: 5.76 10*3/MM3 (ref 4.5–10.7)

## 2018-04-13 PROCEDURE — 80053 COMPREHEN METABOLIC PANEL: CPT | Performed by: ORTHOPAEDIC SURGERY

## 2018-04-13 PROCEDURE — 36415 COLL VENOUS BLD VENIPUNCTURE: CPT

## 2018-04-13 PROCEDURE — 85025 COMPLETE CBC W/AUTO DIFF WBC: CPT | Performed by: ORTHOPAEDIC SURGERY

## 2018-04-13 PROCEDURE — 71046 X-RAY EXAM CHEST 2 VIEWS: CPT

## 2018-04-13 RX ORDER — TIZANIDINE 4 MG/1
4 TABLET ORAL 2 TIMES DAILY PRN
COMMUNITY
End: 2018-08-17 | Stop reason: SDUPTHER

## 2018-04-13 RX ORDER — AMOXICILLIN 500 MG/1
500 CAPSULE ORAL 2 TIMES DAILY
COMMUNITY
End: 2018-05-18

## 2018-04-13 RX ORDER — AMLODIPINE BESYLATE 5 MG/1
5 TABLET ORAL NIGHTLY
COMMUNITY
End: 2018-11-15 | Stop reason: SDUPTHER

## 2018-04-13 NOTE — DISCHARGE INSTRUCTIONS
Take the following medications the morning of surgery with a small sip of water:  GABAPENTIN AND PAIN PILLS AS NEEDED    ARRIVAL TIME 0530 TO MAIN OR         General Instructions:  • Do not eat solid food after midnight the night before surgery.  • You may drink clear liquids day of surgery but must stop at least one hour before your hospital arrival time.  • It is beneficial for you to have a clear drink that contains carbohydrates the day of surgery.  We suggest a 12 to 20 ounce bottle of Gatorade or Powerade for non-diabetic patients or a 12 to 20 ounce bottle of G2 or Powerade Zero for diabetic patients. (Pediatric patients, are not advised to drink a 12 to 20 ounce carbohydrate drink)    Clear liquids are liquids you can see through.  Nothing red in color.     Plain water                               Sports drinks  Sodas                                   Gelatin (Jell-O)  Fruit juices without pulp such as white grape juice and apple juice  Popsicles that contain no fruit or yogurt  Tea or coffee (no cream or milk added)  Gatorade / Powerade  G2 / Powerade Zero    • Infants may have breast milk up to four hours before surgery.  • Infants drinking formula may drink formula up to six hours before surgery.   • Patients who avoid smoking, chewing tobacco and alcohol for 4 weeks prior to surgery have a reduced risk of post-operative complications.  Quit smoking as many days before surgery as you can.  • Do not smoke, use chewing tobacco or drink alcohol the day of surgery.   • If applicable bring your C-PAP/ BI-PAP machine.  • Bring any papers given to you in the doctor’s office.  • Wear clean comfortable clothes and socks.  • Do not wear contact lenses or make-up.  Bring a case for your glasses.   • Bring crutches or walker if applicable.  • Remove all piercings.  Leave jewelry and any other valuables at home.  • Hair extensions with metal clips must be removed prior to surgery.  • The Pre-Admission Testing nurse  will instruct you to bring medications if unable to obtain an accurate list in Pre-Admission Testing.            Preventing a Surgical Site Infection:  • For 2 to 3 days before surgery, avoid shaving with a razor because the razor can irritate skin and make it easier to develop an infection.  • The night prior to surgery sleep in a clean bed with clean clothing.  Do not allow pets to sleep with you.  • Shower on the morning of surgery using a fresh bar of anti-bacterial soap (such as Dial) and clean washcloth.  Dry with a clean towel and dress in clean clothing.  • Ask your surgeon if you will be receiving antibiotics prior to surgery.  • Make sure you, your family, and all healthcare providers clean their hands with soap and water or an alcohol based hand  before caring for you or your wound.    Day of surgery:  Upon arrival, a Pre-op nurse and Anesthesiologist will review your health history, obtain vital signs, and answer questions you may have.  The only belongings needed at this time will be your home medications and if applicable your C-PAP/BI-PAP machine.  If you are staying overnight your family can leave the rest of your belongings in the car and bring them to your room later.  A Pre-op nurse will start an IV and you may receive medication in preparation for surgery, including something to help you relax.  Your family will be able to see you in the Pre-op area.  While you are in surgery your family should notify the waiting room  if they leave the waiting room area and provide a contact phone number.    Please be aware that surgery does come with discomfort.  We want to make every effort to control your discomfort so please discuss any uncontrolled symptoms with your nurse.   Your doctor will most likely have prescribed pain medications.      If you are going home after surgery you will receive individualized written care instructions before being discharged.  A responsible adult must  drive you to and from the hospital on the day of your surgery and stay with you for 24 hours.    If you are staying overnight following surgery, you will be transported to your hospital room following the recovery period.  [facility] has all private rooms.    If you have any questions please call Pre-Admission Testing at [phone].  Deductibles and co-payments are collected on the day of service. Please be prepared to pay the required co-pay, deductible or deposit on the day of service as defined by your plan.

## 2018-04-15 ENCOUNTER — ANESTHESIA EVENT (OUTPATIENT)
Dept: PERIOP | Facility: HOSPITAL | Age: 53
End: 2018-04-15

## 2018-04-15 NOTE — ANESTHESIA PREPROCEDURE EVALUATION
" Anesthesia Evaluation     Patient summary reviewed and Nursing notes reviewed                Airway   Mallampati: III  Neck ROM: limited  Dental      Pulmonary    (+) a smoker Former, recent URI, sleep apnea,   Cardiovascular     ECG reviewed  Rhythm: regular  Rate: normal    (+) hypertension, hyperlipidemia,       Neuro/Psych  (+) headaches, numbness,     GI/Hepatic/Renal/Endo    (+)   renal disease stones, diabetes mellitus type 2,     Musculoskeletal     Abdominal    Substance History - negative use     OB/GYN negative ob/gyn ROS         Other   (+) arthritis   history of cancer remission                    Anesthesia Plan    ASA 3     general   (GA with LMA  History of post anesthesia \"waking violently\")  intravenous induction   Anesthetic plan and risks discussed with patient.      "

## 2018-04-16 ENCOUNTER — HOSPITAL ENCOUNTER (OUTPATIENT)
Facility: HOSPITAL | Age: 53
Setting detail: HOSPITAL OUTPATIENT SURGERY
Discharge: HOME | End: 2018-04-16
Attending: ORTHOPAEDIC SURGERY | Admitting: ORTHOPAEDIC SURGERY

## 2018-04-16 ENCOUNTER — ANESTHESIA (OUTPATIENT)
Dept: PERIOP | Facility: HOSPITAL | Age: 53
End: 2018-04-16

## 2018-04-16 VITALS
RESPIRATION RATE: 18 BRPM | BODY MASS INDEX: 33.18 KG/M2 | WEIGHT: 237 LBS | OXYGEN SATURATION: 96 % | TEMPERATURE: 98.1 F | DIASTOLIC BLOOD PRESSURE: 98 MMHG | SYSTOLIC BLOOD PRESSURE: 143 MMHG | HEART RATE: 56 BPM | HEIGHT: 71 IN

## 2018-04-16 LAB — GLUCOSE BLDC GLUCOMTR-MCNC: 188 MG/DL (ref 70–130)

## 2018-04-16 PROCEDURE — 25010000002 HYDROMORPHONE PER 4 MG

## 2018-04-16 PROCEDURE — 25010000002 MIDAZOLAM PER 1 MG: Performed by: ANESTHESIOLOGY

## 2018-04-16 PROCEDURE — 25010000002 FENTANYL CITRATE (PF) 100 MCG/2ML SOLUTION: Performed by: ANESTHESIOLOGY

## 2018-04-16 PROCEDURE — 25010000002 HYDROMORPHONE PER 4 MG: Performed by: NURSE ANESTHETIST, CERTIFIED REGISTERED

## 2018-04-16 PROCEDURE — 25010000002 FENTANYL CITRATE (PF) 100 MCG/2ML SOLUTION

## 2018-04-16 PROCEDURE — 82962 GLUCOSE BLOOD TEST: CPT

## 2018-04-16 PROCEDURE — 25010000002 PROPOFOL 10 MG/ML EMULSION: Performed by: NURSE ANESTHETIST, CERTIFIED REGISTERED

## 2018-04-16 PROCEDURE — 25010000002 FENTANYL CITRATE (PF) 100 MCG/2ML SOLUTION: Performed by: NURSE ANESTHETIST, CERTIFIED REGISTERED

## 2018-04-16 RX ORDER — PROMETHAZINE HYDROCHLORIDE 25 MG/1
25 TABLET ORAL ONCE AS NEEDED
Status: DISCONTINUED | OUTPATIENT
Start: 2018-04-16 | End: 2018-04-16 | Stop reason: HOSPADM

## 2018-04-16 RX ORDER — MIDAZOLAM HYDROCHLORIDE 1 MG/ML
2 INJECTION INTRAMUSCULAR; INTRAVENOUS
Status: DISCONTINUED | OUTPATIENT
Start: 2018-04-16 | End: 2018-04-16 | Stop reason: HOSPADM

## 2018-04-16 RX ORDER — FENTANYL CITRATE 50 UG/ML
50 INJECTION, SOLUTION INTRAMUSCULAR; INTRAVENOUS
Status: DISCONTINUED | OUTPATIENT
Start: 2018-04-16 | End: 2018-04-16 | Stop reason: HOSPADM

## 2018-04-16 RX ORDER — NALOXONE HCL 0.4 MG/ML
0.2 VIAL (ML) INJECTION AS NEEDED
Status: DISCONTINUED | OUTPATIENT
Start: 2018-04-16 | End: 2018-04-16 | Stop reason: HOSPADM

## 2018-04-16 RX ORDER — SODIUM CHLORIDE, SODIUM LACTATE, POTASSIUM CHLORIDE, CALCIUM CHLORIDE 600; 310; 30; 20 MG/100ML; MG/100ML; MG/100ML; MG/100ML
9 INJECTION, SOLUTION INTRAVENOUS CONTINUOUS
Status: DISCONTINUED | OUTPATIENT
Start: 2018-04-16 | End: 2018-04-16 | Stop reason: HOSPADM

## 2018-04-16 RX ORDER — HYDROMORPHONE HCL 110MG/55ML
PATIENT CONTROLLED ANALGESIA SYRINGE INTRAVENOUS
Status: COMPLETED
Start: 2018-04-16 | End: 2018-04-16

## 2018-04-16 RX ORDER — SODIUM CHLORIDE 0.9 % (FLUSH) 0.9 %
1-10 SYRINGE (ML) INJECTION AS NEEDED
Status: DISCONTINUED | OUTPATIENT
Start: 2018-04-16 | End: 2018-04-16 | Stop reason: HOSPADM

## 2018-04-16 RX ORDER — PROMETHAZINE HYDROCHLORIDE 25 MG/1
25 SUPPOSITORY RECTAL ONCE AS NEEDED
Status: DISCONTINUED | OUTPATIENT
Start: 2018-04-16 | End: 2018-04-16 | Stop reason: HOSPADM

## 2018-04-16 RX ORDER — LABETALOL HYDROCHLORIDE 5 MG/ML
5 INJECTION, SOLUTION INTRAVENOUS
Status: DISCONTINUED | OUTPATIENT
Start: 2018-04-16 | End: 2018-04-16 | Stop reason: HOSPADM

## 2018-04-16 RX ORDER — HYDRALAZINE HYDROCHLORIDE 20 MG/ML
5 INJECTION INTRAMUSCULAR; INTRAVENOUS
Status: DISCONTINUED | OUTPATIENT
Start: 2018-04-16 | End: 2018-04-16 | Stop reason: HOSPADM

## 2018-04-16 RX ORDER — PROMETHAZINE HYDROCHLORIDE 25 MG/1
12.5 TABLET ORAL ONCE AS NEEDED
Status: DISCONTINUED | OUTPATIENT
Start: 2018-04-16 | End: 2018-04-16 | Stop reason: HOSPADM

## 2018-04-16 RX ORDER — DIPHENHYDRAMINE HYDROCHLORIDE 50 MG/ML
12.5 INJECTION INTRAMUSCULAR; INTRAVENOUS
Status: DISCONTINUED | OUTPATIENT
Start: 2018-04-16 | End: 2018-04-16 | Stop reason: HOSPADM

## 2018-04-16 RX ORDER — OXYCODONE AND ACETAMINOPHEN 10; 325 MG/1; MG/1
TABLET ORAL
Status: COMPLETED
Start: 2018-04-16 | End: 2018-04-16

## 2018-04-16 RX ORDER — PROPOFOL 10 MG/ML
VIAL (ML) INTRAVENOUS AS NEEDED
Status: DISCONTINUED | OUTPATIENT
Start: 2018-04-16 | End: 2018-04-16 | Stop reason: SURG

## 2018-04-16 RX ORDER — OXYCODONE AND ACETAMINOPHEN 10; 325 MG/1; MG/1
1 TABLET ORAL EVERY 4 HOURS PRN
Qty: 50 TABLET | Refills: 0 | Status: SHIPPED | OUTPATIENT
Start: 2018-04-16 | End: 2018-04-24 | Stop reason: SDUPTHER

## 2018-04-16 RX ORDER — OXYCODONE AND ACETAMINOPHEN 10; 325 MG/1; MG/1
TABLET ORAL
Status: DISCONTINUED
Start: 2018-04-16 | End: 2018-04-16 | Stop reason: WASHOUT

## 2018-04-16 RX ORDER — FAMOTIDINE 10 MG/ML
20 INJECTION, SOLUTION INTRAVENOUS ONCE
Status: COMPLETED | OUTPATIENT
Start: 2018-04-16 | End: 2018-04-16

## 2018-04-16 RX ORDER — EPHEDRINE SULFATE 50 MG/ML
5 INJECTION, SOLUTION INTRAVENOUS ONCE AS NEEDED
Status: DISCONTINUED | OUTPATIENT
Start: 2018-04-16 | End: 2018-04-16 | Stop reason: HOSPADM

## 2018-04-16 RX ORDER — OXYCODONE HYDROCHLORIDE AND ACETAMINOPHEN 5; 325 MG/1; MG/1
2 TABLET ORAL ONCE AS NEEDED
Status: DISCONTINUED | OUTPATIENT
Start: 2018-04-16 | End: 2018-04-16 | Stop reason: HOSPADM

## 2018-04-16 RX ORDER — PROMETHAZINE HYDROCHLORIDE 25 MG/ML
5 INJECTION, SOLUTION INTRAMUSCULAR; INTRAVENOUS
Status: DISCONTINUED | OUTPATIENT
Start: 2018-04-16 | End: 2018-04-16 | Stop reason: HOSPADM

## 2018-04-16 RX ORDER — PROMETHAZINE HYDROCHLORIDE 25 MG/ML
12.5 INJECTION, SOLUTION INTRAMUSCULAR; INTRAVENOUS ONCE AS NEEDED
Status: DISCONTINUED | OUTPATIENT
Start: 2018-04-16 | End: 2018-04-16 | Stop reason: HOSPADM

## 2018-04-16 RX ORDER — HYDROMORPHONE HCL 110MG/55ML
0.25 PATIENT CONTROLLED ANALGESIA SYRINGE INTRAVENOUS
Status: DISCONTINUED | OUTPATIENT
Start: 2018-04-16 | End: 2018-04-16 | Stop reason: HOSPADM

## 2018-04-16 RX ORDER — LIDOCAINE HYDROCHLORIDE 10 MG/ML
0.5 INJECTION, SOLUTION EPIDURAL; INFILTRATION; INTRACAUDAL; PERINEURAL ONCE AS NEEDED
Status: DISCONTINUED | OUTPATIENT
Start: 2018-04-16 | End: 2018-04-16 | Stop reason: HOSPADM

## 2018-04-16 RX ORDER — ONDANSETRON 2 MG/ML
4 INJECTION INTRAMUSCULAR; INTRAVENOUS ONCE AS NEEDED
Status: DISCONTINUED | OUTPATIENT
Start: 2018-04-16 | End: 2018-04-16 | Stop reason: HOSPADM

## 2018-04-16 RX ORDER — HYDROCODONE BITARTRATE AND ACETAMINOPHEN 7.5; 325 MG/1; MG/1
1 TABLET ORAL ONCE AS NEEDED
Status: DISCONTINUED | OUTPATIENT
Start: 2018-04-16 | End: 2018-04-16 | Stop reason: HOSPADM

## 2018-04-16 RX ORDER — MIDAZOLAM HYDROCHLORIDE 1 MG/ML
1 INJECTION INTRAMUSCULAR; INTRAVENOUS
Status: DISCONTINUED | OUTPATIENT
Start: 2018-04-16 | End: 2018-04-16 | Stop reason: HOSPADM

## 2018-04-16 RX ORDER — FENTANYL CITRATE 50 UG/ML
INJECTION, SOLUTION INTRAMUSCULAR; INTRAVENOUS AS NEEDED
Status: DISCONTINUED | OUTPATIENT
Start: 2018-04-16 | End: 2018-04-16 | Stop reason: SURG

## 2018-04-16 RX ORDER — FLUMAZENIL 0.1 MG/ML
0.2 INJECTION INTRAVENOUS AS NEEDED
Status: DISCONTINUED | OUTPATIENT
Start: 2018-04-16 | End: 2018-04-16 | Stop reason: HOSPADM

## 2018-04-16 RX ORDER — FENTANYL CITRATE 50 UG/ML
INJECTION, SOLUTION INTRAMUSCULAR; INTRAVENOUS
Status: COMPLETED
Start: 2018-04-16 | End: 2018-04-16

## 2018-04-16 RX ADMIN — FENTANYL CITRATE 50 MCG: 50 INJECTION INTRAMUSCULAR; INTRAVENOUS at 06:55

## 2018-04-16 RX ADMIN — FENTANYL CITRATE 50 MCG: 50 INJECTION INTRAMUSCULAR; INTRAVENOUS at 06:50

## 2018-04-16 RX ADMIN — HYDROMORPHONE HYDROCHLORIDE 0.25 MG: 2 INJECTION INTRAMUSCULAR; INTRAVENOUS; SUBCUTANEOUS at 07:28

## 2018-04-16 RX ADMIN — HYDROMORPHONE HYDROCHLORIDE 0.25 MG: 2 INJECTION INTRAMUSCULAR; INTRAVENOUS; SUBCUTANEOUS at 07:43

## 2018-04-16 RX ADMIN — HYDROMORPHONE HYDROCHLORIDE 0.25 MG: 2 INJECTION INTRAMUSCULAR; INTRAVENOUS; SUBCUTANEOUS at 07:53

## 2018-04-16 RX ADMIN — HYDROMORPHONE HYDROCHLORIDE 0.25 MG: 2 INJECTION INTRAMUSCULAR; INTRAVENOUS; SUBCUTANEOUS at 07:33

## 2018-04-16 RX ADMIN — FAMOTIDINE 20 MG: 10 INJECTION, SOLUTION INTRAVENOUS at 06:22

## 2018-04-16 RX ADMIN — HYDROMORPHONE HYDROCHLORIDE 0.25 MG: 2 INJECTION INTRAMUSCULAR; INTRAVENOUS; SUBCUTANEOUS at 07:58

## 2018-04-16 RX ADMIN — PROPOFOL 200 MG: 10 INJECTION, EMULSION INTRAVENOUS at 06:55

## 2018-04-16 RX ADMIN — MIDAZOLAM HYDROCHLORIDE 2 MG: 2 INJECTION, SOLUTION INTRAMUSCULAR; INTRAVENOUS at 06:21

## 2018-04-16 RX ADMIN — HYDROMORPHONE HYDROCHLORIDE 0.25 MG: 2 INJECTION INTRAMUSCULAR; INTRAVENOUS; SUBCUTANEOUS at 08:04

## 2018-04-16 RX ADMIN — OXYCODONE HYDROCHLORIDE AND ACETAMINOPHEN 1 TABLET: 10; 325 TABLET ORAL at 07:29

## 2018-04-16 RX ADMIN — FENTANYL CITRATE 50 MCG: 50 INJECTION INTRAMUSCULAR; INTRAVENOUS at 07:02

## 2018-04-16 RX ADMIN — HYDROMORPHONE HYDROCHLORIDE 0.25 MG: 2 INJECTION INTRAMUSCULAR; INTRAVENOUS; SUBCUTANEOUS at 07:48

## 2018-04-16 RX ADMIN — HYDROMORPHONE HYDROCHLORIDE 0.25 MG: 2 INJECTION INTRAMUSCULAR; INTRAVENOUS; SUBCUTANEOUS at 07:38

## 2018-04-16 RX ADMIN — FENTANYL CITRATE 50 MCG: 50 INJECTION INTRAMUSCULAR; INTRAVENOUS at 07:38

## 2018-04-16 RX ADMIN — SODIUM CHLORIDE, POTASSIUM CHLORIDE, SODIUM LACTATE AND CALCIUM CHLORIDE 9 ML/HR: 600; 310; 30; 20 INJECTION, SOLUTION INTRAVENOUS at 06:22

## 2018-04-16 RX ADMIN — FENTANYL CITRATE 50 MCG: 50 INJECTION INTRAMUSCULAR; INTRAVENOUS at 07:28

## 2018-04-16 RX ADMIN — FENTANYL CITRATE 50 MCG: 50 INJECTION INTRAMUSCULAR; INTRAVENOUS at 06:30

## 2018-04-16 NOTE — OP NOTE
Preoperative diagnosis: Arthrofibrosis left total knee replacement    Postoperative diagnosis: Arthrofibrosis left total knee replacement    Surgeon: Daksha    Anesthesia: General    Procedure: Manipulation left knee    Description of procedure: After satisfactory anesthesia had been induced I flexed the left knee 255° of flexion.  Peripatellar adhesions were felt to release.  I gained full extension.  The patella was manipulated.  There were no complications.

## 2018-04-16 NOTE — ANESTHESIA POSTPROCEDURE EVALUATION
Patient: Flaco Miles    Procedure Summary     Date:  04/16/18 Room / Location:  [facility] OR [address] [place] [address] MAIN OR    Anesthesia Start:  0653 Anesthesia Stop:  0717    Procedure:  LEFT KNEE MANIPULATION (Left ) Diagnosis:      Surgeon:  Vinod Crooks MD Provider:  Darnell Marroquin MD    Anesthesia Type:  general ASA Status:  3          Anesthesia Type: general  Last vitals  BP   148/93 (04/16/18 0750)   Temp   36.6 °C (97.9 °F) (04/16/18 0713)   Pulse   55 (04/16/18 0750)   Resp   18 (04/16/18 0750)     SpO2   94 % (04/16/18 0750)     Post Anesthesia Care and Evaluation    Patient location during evaluation: PACU  Patient participation: complete - patient participated  Level of consciousness: awake and alert  Pain management: adequate  Airway patency: patent  Anesthetic complications: No anesthetic complications    Cardiovascular status: acceptable  Respiratory status: acceptable  Hydration status: acceptable    Comments: --------------------            04/16/18               0750     --------------------   BP:       148/93     Pulse:      55       Resp:       18       Temp:                SpO2:      94%      --------------------

## 2018-04-24 ENCOUNTER — OFFICE VISIT (OUTPATIENT)
Dept: FAMILY MEDICINE CLINIC | Facility: CLINIC | Age: 53
End: 2018-04-24

## 2018-04-24 VITALS
BODY MASS INDEX: 33.12 KG/M2 | HEIGHT: 71 IN | SYSTOLIC BLOOD PRESSURE: 124 MMHG | TEMPERATURE: 98.1 F | OXYGEN SATURATION: 97 % | WEIGHT: 236.6 LBS | DIASTOLIC BLOOD PRESSURE: 80 MMHG | HEART RATE: 70 BPM

## 2018-04-24 DIAGNOSIS — E11.42 DIABETIC PERIPHERAL NEUROPATHY (HCC): ICD-10-CM

## 2018-04-24 DIAGNOSIS — I10 ESSENTIAL (PRIMARY) HYPERTENSION: ICD-10-CM

## 2018-04-24 DIAGNOSIS — G89.29 CHRONIC PAIN OF BOTH KNEES: Primary | ICD-10-CM

## 2018-04-24 DIAGNOSIS — M25.561 CHRONIC PAIN OF BOTH KNEES: Primary | ICD-10-CM

## 2018-04-24 DIAGNOSIS — G89.4 CHRONIC PAIN SYNDROME: ICD-10-CM

## 2018-04-24 DIAGNOSIS — M25.562 CHRONIC PAIN OF BOTH KNEES: Primary | ICD-10-CM

## 2018-04-24 PROCEDURE — 99213 OFFICE O/P EST LOW 20 MIN: CPT | Performed by: INTERNAL MEDICINE

## 2018-04-24 RX ORDER — OXYCODONE AND ACETAMINOPHEN 10; 325 MG/1; MG/1
1 TABLET ORAL EVERY 4 HOURS PRN
Qty: 90 TABLET | Refills: 0 | Status: SHIPPED | OUTPATIENT
Start: 2018-04-24 | End: 2018-05-18 | Stop reason: SDUPTHER

## 2018-04-24 RX ORDER — ALLOPURINOL 300 MG/1
300 TABLET ORAL DAILY
Qty: 90 TABLET | Refills: 0 | Status: SHIPPED | OUTPATIENT
Start: 2018-04-24 | End: 2018-11-15 | Stop reason: SDUPTHER

## 2018-04-24 RX ORDER — GABAPENTIN 300 MG/1
300 CAPSULE ORAL 4 TIMES DAILY
Qty: 360 CAPSULE | Refills: 0 | Status: SHIPPED | OUTPATIENT
Start: 2018-04-24 | End: 2018-07-16

## 2018-04-24 RX ORDER — ALLOPURINOL 300 MG/1
600 TABLET ORAL 2 TIMES DAILY
Qty: 360 TABLET | Refills: 0 | Status: SHIPPED | OUTPATIENT
Start: 2018-04-24 | End: 2018-04-24 | Stop reason: SDUPTHER

## 2018-04-24 NOTE — PROGRESS NOTES
"Subjective   Patient ID: Flaco Miles is a 52 y.o. male presents with   Chief Complaint   Patient presents with   • Back Pain     percocet refill       HPI - This patient presents today for chronic bilateral knee pain chronic pain syndrome multiple surgeries diabetic peripheral neuropathy and essential hypertension.  Overall he's doing status quo he still seeing the orthopedist.  He needs refill on his pain medicines anti-inflammatories alone are not sustaining him.  He also has peripheral neuropathy and needs gabapentin refill    Assessment plan    Chronic pain multiple orthopedic surgeries degenerative disc in the neck and lumbar spine-refill oxycodone 10/325 [place] report has been obtained narcotic agreement was signed.  Appointment with pain management    Essential hypertension continue current regimen    Diabetic peripheral neuropathy gabapentin 600 mg twice daily well-tolerated.    Allergies   Allergen Reactions   • Fexofenadine-Pseudoephed Er Other (See Comments)     TACHYCARDIA       The following portions of the patient's history were reviewed and updated as appropriate: allergies, current medications, past family history, past medical history, past social history, past surgical history and problem list.      Review of Systems   Constitutional: Negative.    HENT: Negative.    Respiratory: Negative.    Musculoskeletal: Positive for arthralgias, back pain, gait problem, joint swelling and myalgias.   Psychiatric/Behavioral: Negative.        Objective     Vitals:    04/24/18 1408   BP: 124/80   BP Location: Left arm   Patient Position: Sitting   Cuff Size: Large Adult   Pulse: 70   Temp: 98.1 °F (36.7 °C)   TempSrc: Oral   SpO2: 97%   Weight: 107 kg (236 lb 9.6 oz)   Height: 180.3 cm (70.98\")         Physical Exam   Constitutional: He is oriented to person, place, and time. He appears well-developed and well-nourished.   Musculoskeletal:   Decreased range of motion in the knee   Neurological: He is alert " and oriented to person, place, and time.   Skin: Skin is warm and dry.   Psychiatric: He has a normal mood and affect. His behavior is normal.   Nursing note and vitals reviewed.        Flaco was seen today for back pain.    Diagnoses and all orders for this visit:    Chronic pain of both knees  -     Ambulatory Referral to Pain Management    Diabetic peripheral neuropathy  -     Ambulatory Referral to Pain Management    Chronic pain syndrome  -     Ambulatory Referral to Pain Management    Other orders  -     oxyCODONE-acetaminophen (PERCOCET)  MG per tablet; Take 1 tablet by mouth Every 4 (Four) Hours As Needed for Moderate Pain .  -     Discontinue: allopurinol (ZYLOPRIM) 300 MG tablet; Take 2 tablets by mouth 2 (Two) Times a Day.  -     allopurinol (ZYLOPRIM) 300 MG tablet; Take 1 tablet by mouth Daily.  -     gabapentin (NEURONTIN) 300 MG capsule; Take 1 capsule by mouth 4 (Four) Times a Day.        Call or return to clinic prn if these symptoms worsen or fail to improve as anticipated.

## 2018-05-03 ENCOUNTER — TELEPHONE (OUTPATIENT)
Dept: FAMILY MEDICINE CLINIC | Facility: CLINIC | Age: 53
End: 2018-05-03

## 2018-05-18 ENCOUNTER — OFFICE VISIT (OUTPATIENT)
Dept: FAMILY MEDICINE CLINIC | Facility: CLINIC | Age: 53
End: 2018-05-18

## 2018-05-18 VITALS
WEIGHT: 237 LBS | BODY MASS INDEX: 33.18 KG/M2 | OXYGEN SATURATION: 98 % | RESPIRATION RATE: 18 BRPM | HEART RATE: 70 BPM | SYSTOLIC BLOOD PRESSURE: 160 MMHG | DIASTOLIC BLOOD PRESSURE: 92 MMHG | TEMPERATURE: 98.2 F | HEIGHT: 71 IN

## 2018-05-18 DIAGNOSIS — G89.4 CHRONIC PAIN SYNDROME: Primary | ICD-10-CM

## 2018-05-18 DIAGNOSIS — I10 ESSENTIAL (PRIMARY) HYPERTENSION: ICD-10-CM

## 2018-05-18 PROCEDURE — 99213 OFFICE O/P EST LOW 20 MIN: CPT | Performed by: INTERNAL MEDICINE

## 2018-05-18 RX ORDER — OXYCODONE AND ACETAMINOPHEN 10; 325 MG/1; MG/1
1 TABLET ORAL EVERY 4 HOURS PRN
Qty: 90 TABLET | Refills: 0 | Status: SHIPPED | OUTPATIENT
Start: 2018-05-18 | End: 2018-06-20 | Stop reason: SDUPTHER

## 2018-05-18 NOTE — PROGRESS NOTES
"Subjective   Patient ID: Flaco Miles is a 52 y.o. male presents with   Chief Complaint   Patient presents with   • Knee Pain     Chronic       HPI - This patient presents today for chronic back pain and knee pain and arthritic pain he's had multiple surgeries.  Nonsteroidals alone don't help his pain.  Pain is moderate to severe in intensity worse with ambulation.    Assessment plan    Chronic pain syndrome-oxycodone 10, 90 no refills Bandar reports obtaining narcotic agreement was signed.    Essential hypertension lisinopril.  Recheck blood pressure and it was 128/80.    Allergies   Allergen Reactions   • Fexofenadine-Pseudoephed Er Other (See Comments)     TACHYCARDIA       The following portions of the patient's history were reviewed and updated as appropriate: allergies, current medications, past family history, past medical history, past social history, past surgical history and problem list.      Review of Systems   Constitutional: Negative.    Respiratory: Negative.    Cardiovascular: Negative.    Musculoskeletal: Positive for arthralgias, back pain and gait problem.       Objective     Vitals:    05/18/18 1254   BP: 160/92   Pulse: 70   Resp: 18   Temp: 98.2 °F (36.8 °C)   TempSrc: Oral   SpO2: 98%   Weight: 108 kg (237 lb)   Height: 180.3 cm (71\")         Physical Exam   Constitutional: He is oriented to person, place, and time. He appears well-developed and well-nourished.   HENT:   Head: Normocephalic and atraumatic.   Pulmonary/Chest: Effort normal and breath sounds normal.   Neurological: He is alert and oriented to person, place, and time.   Nursing note and vitals reviewed.        Flaco was seen today for knee pain.    Diagnoses and all orders for this visit:    Chronic pain syndrome    Essential (primary) hypertension    Other orders  -     oxyCODONE-acetaminophen (PERCOCET)  MG per tablet; Take 1 tablet by mouth Every 4 (Four) Hours As Needed for Moderate Pain .        Call or return to " clinic prn if these symptoms worsen or fail to improve as anticipated.

## 2018-05-21 ENCOUNTER — OFFICE VISIT (OUTPATIENT)
Dept: PAIN MEDICINE | Facility: CLINIC | Age: 53
End: 2018-05-21

## 2018-05-21 VITALS
RESPIRATION RATE: 16 BRPM | TEMPERATURE: 98.9 F | SYSTOLIC BLOOD PRESSURE: 129 MMHG | HEIGHT: 71 IN | WEIGHT: 245 LBS | BODY MASS INDEX: 34.3 KG/M2 | HEART RATE: 78 BPM | OXYGEN SATURATION: 96 % | DIASTOLIC BLOOD PRESSURE: 86 MMHG

## 2018-05-21 DIAGNOSIS — M19.90 ARTHRITIS: ICD-10-CM

## 2018-05-21 DIAGNOSIS — G89.4 CHRONIC PAIN SYNDROME: Primary | ICD-10-CM

## 2018-05-21 DIAGNOSIS — Z79.891 ENCOUNTER FOR LONG-TERM OPIATE ANALGESIC USE: ICD-10-CM

## 2018-05-21 PROCEDURE — 99204 OFFICE O/P NEW MOD 45 MIN: CPT | Performed by: ANESTHESIOLOGY

## 2018-05-21 PROCEDURE — 80305 DRUG TEST PRSMV DIR OPT OBS: CPT | Performed by: ANESTHESIOLOGY

## 2018-05-21 NOTE — PROGRESS NOTES
"CHIEF COMPLAINT  Pt is reffered here by Dr Hayden Morgan for diffuse pain. Pt has had numerous surgeries,including 2nd L TKR done 3/2018,3 cervical,4 lumbar,2 R and @L shoulder surgeries, R elbow,L ankle surgery.  Pt states he is not interested in any further (lumbar,cervical,shoulder,knee injections),citing \"no relief\" from any injections.  Pt has been prescribed percocet,zanaflex and gabapentin per Dr Morgan and Pt wishes to continue this pharmaco therapy here.  Pt currently in P. T. For L knee, and has \"completed\" P. T. for all other pain.    Subjective   Flaco Miles is a 52 y.o. male.   He presents to the office for evaluation of multiple & diffuse pain which has been chronic for >10 yrs. He was referred here by Dr. Morgan.     This 52-year-old young gentleman has a history of multiple knee surgeries.  He complains that he has had 8 surgeries on his left knee.  In March 2018 he had his second knee replacement on the left.  He has had 2 surgeries on his right knee.  He's had 4 back surgeries as well as 3 neck Surgeries.  He states that he's had 2 surgeries on each shoulder.  He also is in the left ankle surgery and right elbow surgery.  He has a history of multiple procedures due to renal stones.  Also had a left groin hernia repair in 2017 along with prostate surgery and a repair of a right testicular hydrocele.      Rasheed Depression Inventory is very elevated with a score of 36.    He states that he has medical comorbidities including high cholesterol and hypertension and also diabetes.  He's had diabetes for a decade.  Also history of degenerative disc disease and degenerative joint disease and spinal stenosis.    Regarding social history, he is .  He is disabled from his previous job as a .  He has 3 adult children.  He does not drink alcohol.  He is a former smoker but he stopped last year.  He denies any risky behaviors and denies any illicit drug use.  Regarding family " "history, his parents both had a history of cardiac disease and his father had a history of back problems knee problems and also dementia, and his mother had a history of strokes.  He has a strong family history of kidney stones.    He describes pain radiates to a 6 to an 8 out of 10 on midline in the lumbar area and radiating into the right buttock into the right buttock and into the right posterior lateral hip, with throbbing and burning and tingling elements that radiate down to just past the knee.  He has throbbing pains and also dull pains of the knees, more prominently on the left and dull on the right.  He also stabbing pain across the ankles and across to all the toes.  He has some moderate midline thoracic area pain between the scapula a which is in the operative mid thoracic area.  He has some midline neck pain that also involves some radiation with shooting and stabbing elements across the shoulders perhaps into the suprascapular areas and down to the acromioclavicular areas bilaterally.  He also complains of soreness and heaviness in the apex of his skull.    Pain causes him to feel angry and depressed and restless and tense and irritable and \"pissed off\".  He describes constant pain.  He also describes it while always in pain it is quite severe.  It affects his sleep.    He probably get some mild relief from the use of opiates.  In fact he considers this relief to be moderate because it will bring his pain down from an 8 out of 10 down to a 3 out of 10.  He tells me that this will allow him to engage in family activities and social activities and also hobbies.  We will allow him to sit for longer periods of time and engage in social activities.  He denies any personal or family history of drug or alcohol abuse.  He has engaged in several different types of injection management and physical therapy a lot of trials of multiple NSAIDs.    What makes pain worse:  Severe incident pain.  Sitting.  Standing.  "     What makes pain better:  Percocet.  Muscle spasms help also.  Lying down (sleep number bed).      Comorbities:  Low level of activity, obesity.  Previous smoker.  Diabetic.      I reviewed a report of a CT scan of the lumbar spine from April 1, 2010.  The radiologist impression was an L4-L5 posterior fusion and L45 left facet joint effusion.  Nonunion of vertebral bodies is noted.  Mild right foraminal stenosis noted at L4-L5.  There was some mild L5-S1 bilateral mild foraminal stenosis also noted.    I reviewed a report of an MRI of the lumbar spine with contrast from April 1, 2010, and this was status post L5-S1 fusion and of note this is of course different than the previous CT scan that was just mentioned.  With regards to the L5-S1 fusion there is scar tissue around the right S1 nerve root and some foraminal narrowing on the right at the L5-S1 level.    The preliminary urine drug screen is reviewed and it was positive only for barbiturates.  It isn't noted is negative for opiates and negative for oxycodone.  It would be sent for routine confirmation.    There are also several MRIs from a former primary care provider.  From January 22, 2014 there was an MRI of the right shoulder and I reviewed that report.  Previous acromioplasty was noted and there were marked of for pathic changes in the acromioclavicular joint.  There was some subdeltoid subacromial bursitis noted and biceps tenosynovitis.  A Bankart tear was noted.      From that same date at [facility] primary care there is also a left knee MRI.  There is a ACL graft with a chronic Isadora graft and complex tears of the medial meniscus.  There was marked osteoarthritis of the medial compartment of the knee.    The same date is MRI of the right knee and there was ACL laxity and some mild osteoarthritis of the medial compartment on the right knee.    There was an MRI of the lumbar spine with and without contrast from October 1, 2015, this is a report  from [facility].  History of lumbar spine fusion in 2004 and a previous surgery back in the 90s.  There is L5-S1 disc prosthesis noted and also bilateral pedicular L5-S1 screws noted.  Facet changes were noted at L5-S1, along with a mild disc bulge at L4-L5 and mild canal stenosis noted at L4 5.  Mild inferior bilateral neuroforaminal stenosis was noted at L4-L5 without nerve impingement.        History of Present Illness     PEG Assessment   What number best describes your pain on average in the past week?7  What number best describes how, during the past week, pain has interfered with your enjoyment of life?7  What number best describes how, during the past week, pain has interfered with your general activity?  7        Current Outpatient Prescriptions:   •  allopurinol (ZYLOPRIM) 300 MG tablet, Take 1 tablet by mouth Daily., Disp: 90 tablet, Rfl: 0  •  amLODIPine (NORVASC) 5 MG tablet, Take 5 mg by mouth Every Night., Disp: , Rfl:   •  atorvastatin (LIPITOR) 20 MG tablet, Take 1 tablet by mouth Daily., Disp: 90 tablet, Rfl: 1  •  clobetasol (TEMOVATE) 0.05 % cream, Apply  topically 2 (Two) Times a Day As Needed (itching)., Disp: 45 g, Rfl: 0  •  gabapentin (NEURONTIN) 300 MG capsule, Take 1 capsule by mouth 4 (Four) Times a Day., Disp: 360 capsule, Rfl: 0  •  lisinopril (PRINIVIL,ZESTRIL) 10 MG tablet, Take 1 tablet by mouth Daily., Disp: 90 tablet, Rfl: 1  •  metFORMIN (GLUCOPHAGE) 500 MG tablet, Take 1 tablet by mouth 2 (Two) Times a Day With Meals., Disp: 180 tablet, Rfl: 1  •  oxyCODONE-acetaminophen (PERCOCET)  MG per tablet, Take 1 tablet by mouth Every 4 (Four) Hours As Needed for Moderate Pain ., Disp: 90 tablet, Rfl: 0  •  tamsulosin (FLOMAX) 0.4 MG capsule 24 hr capsule, Take 1 capsule by mouth Daily., Disp: 90 capsule, Rfl: 1  •  tiZANidine (ZANAFLEX) 4 MG tablet, Take 4 mg by mouth 2 (Two) Times a Day As Needed for Muscle Spasms., Disp: , Rfl:     The following portions of  "the patient's history were reviewed and updated as appropriate: allergies, current medications, past family history, past medical history, past social history, past surgical history and problem list.      REVIEW OF PERTINENT MEDICAL DATA        Review of Systems   Constitutional: Positive for activity change (decreased). Negative for appetite change.   HENT: Positive for hearing loss. Negative for trouble swallowing.    Eyes: Positive for visual disturbance (due to diabetes).   Respiratory: Positive for apnea. Negative for chest tightness and shortness of breath.    Cardiovascular: Negative for chest pain.   Gastrointestinal: Positive for constipation. Negative for diarrhea and nausea.   Genitourinary: Negative for difficulty urinating and dysuria.   Musculoskeletal: Positive for arthralgias, back pain and neck pain.   Allergic/Immunologic: Negative for immunocompromised state.   Neurological: Positive for headaches (migraines).   Hematological: Does not bruise/bleed easily.   Psychiatric/Behavioral: Positive for sleep disturbance. Negative for suicidal ideas. The patient is nervous/anxious.          Vitals:    05/21/18 1403   BP: 129/86   Pulse: 78   Resp: 16   Temp: 98.9 °F (37.2 °C)   SpO2: 96%   Weight: 111 kg (245 lb)   Height: 180.3 cm (70.98\")   PainSc: 7  Comment: Generalized pain ranges from 3-8/10   PainLoc: Generalized         Objective   Physical Exam   Constitutional: He is oriented to person, place, and time. Vital signs are normal. He appears well-developed and well-nourished. He does not have a sickly appearance.   HENT:   Head: Normocephalic and atraumatic.   Right Ear: Hearing and external ear normal.   Left Ear: Hearing and external ear normal.   Nose: Nose normal.   Mouth/Throat: Uvula is midline and oropharynx is clear and moist.   Eyes: Conjunctivae and EOM are normal. Pupils are equal, round, and reactive to light.   Neck: Neck supple. No JVD present. Muscular tenderness present. Decreased " range of motion present. No Brudzinski's sign and no Kernig's sign noted.   Cardiovascular: Normal rate, regular rhythm and normal heart sounds.    Pulmonary/Chest: Effort normal and breath sounds normal.   Abdominal: Soft. Normal appearance and bowel sounds are normal. There is no hepatosplenomegaly. There is no tenderness. There is no CVA tenderness.   Musculoskeletal:        Right shoulder: He exhibits decreased range of motion and tenderness.        Left shoulder: He exhibits decreased range of motion and tenderness.        Right knee: He exhibits deformity. Tenderness found.        Left knee: He exhibits deformity. Tenderness found.        Cervical back: He exhibits decreased range of motion, tenderness and pain.        Thoracic back: He exhibits tenderness.        Lumbar back: He exhibits decreased range of motion (pain on flexion past 45 deg.  Pain on extension past 5 deg.  Rotation limited to 60 deg bilat.  ), tenderness, pain and spasm.   Lymphadenopathy:     He has no cervical adenopathy.   Neurological: He is alert and oriented to person, place, and time. He displays no tremor. No cranial nerve deficit. Coordination and gait normal.   Skin: Skin is warm and dry.   Psychiatric: He has a normal mood and affect. His behavior is normal. Judgment and thought content normal.   Nursing note and vitals reviewed.      Assessment/Plan   Flaco was seen today for pain.    Diagnoses and all orders for this visit:    Chronic pain syndrome  -     Ambulatory Referral to Psychology  -     POC Urine Drug Screen, Triage  -     Urine Drug Screen Confirmation - Urine, Urine, Clean Catch; Future    Encounter for long-term opiate analgesic use  -     POC Urine Drug Screen, Triage  -     Urine Drug Screen Confirmation - Urine, Urine, Clean Catch; Future    Arthritis  -     POC Urine Drug Screen, Triage  -     Urine Drug Screen Confirmation - Urine, Urine, Clean Catch; Future        --- Follow-up 1 month  -- UDS for routine  "confirmation  -- it seems that this gentleman could be a reasonable candidate to continue opioid therapy from a medical standpoint, but we will not make that final decision without psychological risk assessment.    -- Functional Goals were outlined as part of establishment of a narcotic prescribing agreement.  This should be considered further after the psychological evaluation is reviewed.    -- in general the plan would be to keep him at 40mg or less of opioid per day.  If we are not succeeding in such a regard then we should consider neuromodulation.       -------  Opioid Risk Assessment / Formalized Substance Abuse Risk Stratification:  Why is a consultation with the pain psychologist needed?    The medical decision making when deciding whether or not to treat chronic painful conditions with prescription pain medications is a complex process.  The decisions are even more complex when considering whether or not there is a medical need for the use of a controlled substance.  Not only is there consideration of the medical or anatomic pathology, but also safety, other medical conditions, other medications or supplements used, social factors, psychological factors, and the multimodal or holistic treatment plan that is most reasonable for a given condition.      In our practice, we adhere to the most fundamental value of modern medicine... \"First Do No Harm.\"  There can be significant risks to the use of prescribed controlled substances, including opioid pain medications.  Medications are tools.  All tools can be used properly or improperly.  There are risks to the use of controlled substances.  We will follow best medical practices to the best of our ability, including observance of the [place] statutes as well as national & CDC guidelines.    The risks and benefits of prescription pain medications must be weighed.  Many factors can put a patient at risk for misuse of pain medication.  If we feel that there are " concerns, then we will seek the opinion of our colleague, the pain psychologist, to assist in assist in risk stratification and development of a plan to decrease risk.   (For more information, see Section 4 of [address] [place] 9:260, which is [place] statute.)    If a patient has demonstrated elements of noncompliance, then we are required to seek the opinion of a mental health professional for a substance abuse risk assessment if we want to even consider the continuation of prescription pain medication.  Otherwise, we are required by this law to stop prescribing pain medication altogether, and we will need to discharge the patient from our practice.  (See Section 5 of [address] [place] 9:260 for more information.)    The pain psychologist is a valuable member of our multidisciplinary pain management practice model.  Patients should expect a long discussion with the psychologist to help with understanding about the patient's condition and current treatment.  Also, there may be some psychological testing assessments given.  The information gathered is important to all of us.  Not only do we feel it is necessary as part of your treatment plan, we also feel that the evaluation is required by our best practice guidelines and by the statutes of the [facility].   -------    A medication management agreement is signed by the patient and the provider today.  Reviewed with the patient that this contract is specific to controlled substances, specifically pain medication.  Reviewed core of pain medicine is to decrease pain and increase functional level.  Reviewed the medication must be picked up as a written prescription from this office and will not be called into any pharmacy.  Reviewed the use of Ernesto within the office setting.  Reviewed causes for potential of discontinuation of opiates,  including but not limited to consideration for diversion, obtaining other controlled substances from other license practitioners, or   inappropriate office behavior.  Patient understands to use a controlled substance as prescribed by physician and to avoid improper use of controlled substances.  Patient will also verbalized understanding that prescriptions to be filled at the same pharmacy  unless office staff is made aware of this.  Patient understands they can be submitted to random urine drug screens and/or random pill counts on any request.  Patient understands they are not to receive early refills.  The patient must produce an official police report for any effort to replace controlled substances that are lost or stolen.  No use of illegal street drugs while receiving controlled substances from this prescribing provider.  The patient is to take medication as prescribed  and not deviate from the normal schedule without consultation from the provider.  Patient is not to share the medication with others or to take medication with alcohol or other sedatives.  Use caution when driving or operating machinery.  Alert office if the patient becomes pregnant or begins nursing a child.  Reviewed the use of controlled substances recreates a risk for respiratory depression, which may result in serious harm or even death.  Must always keep the prescription in the original container.  Patient is to store controlled substances in a locked cabinet or other secure storage unit that is cool, dry and out of sunlight.  Patient must immediately notify office if any controlled substances is stolen or improperly taken by another individual.  Reviewed risk for physical dependence, tolerance and addiction.    Patient appears stable with current regimen. No adverse effects. Regarding continuation of opioids, there is no evidence of aberrant behavior or any red flags.  The patient continues with appropriate response to opioid therapy. ADL's remain intact by self.     he does have a significant history of multiple degenerative and arthritic changes and demonstrates  moderate improvement with multimodal therapy including opioid therapy, which allows him to engage in ADLs and family activities. The continuation of opioid therapy is therefore not contraindicated. We will however respect the March 2016 CDC Guidelines and will plan to avoid overexposure to opioids and avoid dose escalation.             CHRISTIANA REPORT    As part of the patient's treatment plan, I am prescribing controlled substances. The patient has been made aware of appropriate use of such medications, including potential risk of somnolence, limited ability to drive and/or work safely, and the potential for dependence or overdose. It has also bee made clear that these medications are for use by this patient only, without concomitant use of alcohol or other substances unless prescribed.     Patient has completed prescribing agreement detailing terms of continued prescribing of controlled substances, including monitoring CHRISTIANA reports, urine drug screening, and pill counts if necessary. The patient is aware that inappropriate use will results in cessation of prescribing such medications.    CHRISTIANA report has been reviewed and scanned into the patient's chart.    As the clinician, I personally reviewed the CHRISTIANA from today as above while the patient was in the office today.    History and physical exam exhibit continued safe and appropriate use of controlled substances.         EMR Dragon/Transcription disclaimer:   Much of this encounter note is an electronic transcription/translation of spoken language to printed text. The electronic translation of spoken language may permit erroneous, or at times, nonsensical words or phrases to be inadvertently transcribed; Although I have reviewed the note for such errors, some may still exist.

## 2018-05-22 ENCOUNTER — RESULTS ENCOUNTER (OUTPATIENT)
Dept: PAIN MEDICINE | Facility: CLINIC | Age: 53
End: 2018-05-22

## 2018-05-22 DIAGNOSIS — G89.4 CHRONIC PAIN SYNDROME: ICD-10-CM

## 2018-05-22 DIAGNOSIS — Z79.891 ENCOUNTER FOR LONG-TERM OPIATE ANALGESIC USE: ICD-10-CM

## 2018-05-22 DIAGNOSIS — M19.90 ARTHRITIS: ICD-10-CM

## 2018-05-22 LAB
POC AMPHETAMINES: NEGATIVE
POC BARBITURATES: POSITIVE
POC BENZODIAZEPHINES: NEGATIVE
POC COCAINE: NEGATIVE
POC METHADONE: NEGATIVE
POC METHAMPHETAMINE SCREEN URINE: NEGATIVE
POC OPIATES: NEGATIVE
POC OXYCODONE: POSITIVE
POC PHENCYCLIDINE: NEGATIVE
POC PROPOXYPHENE: NEGATIVE
POC THC: NEGATIVE
POC TRICYCLIC ANTIDEPRESSANTS: NEGATIVE

## 2018-06-18 RX ORDER — OXYCODONE AND ACETAMINOPHEN 10; 325 MG/1; MG/1
1 TABLET ORAL EVERY 4 HOURS PRN
Qty: 90 TABLET | Refills: 0 | OUTPATIENT
Start: 2018-06-18

## 2018-06-18 NOTE — TELEPHONE ENCOUNTER
Medication Refill Request    Date of phone call: 6/15/18    Medication being requested: Oxycodone-apap 10 sig: q4hrs  Qty: 90    Date of last visit: 5/21/18    Date of last refill: 5/18/18    CHRISTIANA up to date?: 5/18/18    Next Follow up?: 6/20/18    Any new pertinent information? (i.e, new medication allergies, new use of medications, change in patient's health or condition, non-compliance or inconsistency with prescribing agreement?): n/a

## 2018-06-20 ENCOUNTER — OFFICE VISIT (OUTPATIENT)
Dept: PAIN MEDICINE | Facility: CLINIC | Age: 53
End: 2018-06-20

## 2018-06-20 VITALS
HEART RATE: 83 BPM | RESPIRATION RATE: 18 BRPM | TEMPERATURE: 98.5 F | HEIGHT: 71 IN | OXYGEN SATURATION: 95 % | SYSTOLIC BLOOD PRESSURE: 117 MMHG | BODY MASS INDEX: 34.07 KG/M2 | DIASTOLIC BLOOD PRESSURE: 67 MMHG | WEIGHT: 243.4 LBS

## 2018-06-20 DIAGNOSIS — M96.1 POSTLAMINECTOMY SYNDROME: ICD-10-CM

## 2018-06-20 DIAGNOSIS — Z79.899 ENCOUNTER FOR LONG-TERM CURRENT USE OF HIGH RISK MEDICATION: ICD-10-CM

## 2018-06-20 DIAGNOSIS — G89.4 CHRONIC PAIN SYNDROME: Primary | ICD-10-CM

## 2018-06-20 DIAGNOSIS — G62.9 PERIPHERAL POLYNEUROPATHY: ICD-10-CM

## 2018-06-20 DIAGNOSIS — M19.90 ARTHRITIS: ICD-10-CM

## 2018-06-20 PROCEDURE — 99213 OFFICE O/P EST LOW 20 MIN: CPT | Performed by: ANESTHESIOLOGY

## 2018-06-20 RX ORDER — GABAPENTIN 600 MG/1
600 TABLET ORAL 3 TIMES DAILY
Qty: 270 TABLET | Refills: 0 | Status: SHIPPED | OUTPATIENT
Start: 2018-06-20 | End: 2018-06-20 | Stop reason: SDUPTHER

## 2018-06-20 RX ORDER — OXYCODONE AND ACETAMINOPHEN 10; 325 MG/1; MG/1
1 TABLET ORAL EVERY 8 HOURS PRN
Qty: 90 TABLET | Refills: 0 | Status: SHIPPED | OUTPATIENT
Start: 2018-06-20 | End: 2018-07-16 | Stop reason: SDUPTHER

## 2018-06-20 RX ORDER — GABAPENTIN 600 MG/1
600 TABLET ORAL 3 TIMES DAILY
Qty: 270 TABLET | Refills: 0 | Status: SHIPPED | OUTPATIENT
Start: 2018-06-20 | End: 2018-07-16 | Stop reason: SDUPTHER

## 2018-06-20 NOTE — PROGRESS NOTES
CHIEF COMPLAINT  Follow-up for generalized pain. Mr. Miles states that his pain is unchanged from his last appt.    Subjective   Flaco Miles is a 52 y.o. male  who presents to the office for follow-up.He has a history of arthritis, neuropathy, postlaminectomy syndrome.      Back Pain   This is a chronic problem. The current episode started more than 1 year ago. The problem occurs constantly. The problem is unchanged. The pain is present in the thoracic spine and lumbar spine. The quality of the pain is described as aching, burning and shooting. The pain radiates to the right foot, right knee, left knee and left thigh. The pain is severe. The symptoms are aggravated by bending, standing and twisting. Associated symptoms include numbness (right leg,buttucks) and weakness. Pertinent negatives include no chest pain. Risk factors include obesity. He has tried analgesics, bed rest, heat, home exercises, ice, muscle relaxant and NSAIDs for the symptoms.   Neck Pain    This is a chronic problem. The current episode started more than 1 year ago. Associated symptoms include numbness (right leg,buttucks) and weakness. Pertinent negatives include no chest pain.   Knee Pain    The pain is present in the left knee and right knee. The quality of the pain is described as aching. The pain is moderate. The pain has been intermittent since onset. Associated symptoms include numbness (right leg,buttucks). The symptoms are aggravated by movement. The treatment provided mild relief.        PEG Assessment   What number best describes your pain on average in the past week?7  What number best describes how, during the past week, pain has interfered with your enjoyment of life?7  What number best describes how, during the past week, pain has interfered with your general activity?  7      The following portions of the patient's history were reviewed and updated as appropriate: allergies, current medications, past family history, past  "medical history, past social history, past surgical history and problem list.    Review of Systems   Constitutional: Positive for fatigue.   HENT: Negative for congestion.    Eyes: Positive for visual disturbance (blurry vision).   Respiratory: Positive for cough, shortness of breath and wheezing.    Cardiovascular: Positive for leg swelling. Negative for chest pain and palpitations.   Gastrointestinal: Positive for constipation and diarrhea.   Genitourinary: Negative for difficulty urinating.   Musculoskeletal: Positive for back pain and neck pain.   Neurological: Positive for weakness and numbness (right leg,buttucks).   Psychiatric/Behavioral: Positive for sleep disturbance. Negative for suicidal ideas. The patient is not nervous/anxious.                    Vitals:    06/20/18 1247   BP: 117/67   Pulse: 83   Resp: 18   Temp: 98.5 °F (36.9 °C)   SpO2: 95%   Weight: 110 kg (243 lb 6.4 oz)   Height: 180.3 cm (71\")   PainSc: 6  Comment: right leg and buttocks 6/10   PainLoc: Back         Objective   Physical Exam   Constitutional: He is oriented to person, place, and time. Vital signs are normal. He appears well-developed and well-nourished. He does not have a sickly appearance.   HENT:   Head: Normocephalic and atraumatic.   Right Ear: Hearing normal.   Left Ear: Hearing normal.   Mouth/Throat: Uvula is midline.   Eyes: Conjunctivae are normal.   Neck: Neck supple. Muscular tenderness present. Decreased range of motion present. No Brudzinski's sign and no Kernig's sign noted.   Pulmonary/Chest: Effort normal.   Abdominal: Normal appearance. There is no hepatosplenomegaly. There is no tenderness. There is no CVA tenderness.   Musculoskeletal:        Right shoulder: He exhibits decreased range of motion and tenderness.        Left shoulder: He exhibits decreased range of motion and tenderness.        Right knee: He exhibits deformity. Tenderness found.        Left knee: He exhibits deformity. Tenderness found.        " Cervical back: He exhibits decreased range of motion, tenderness and pain.        Thoracic back: He exhibits tenderness.        Lumbar back: He exhibits decreased range of motion (pain on flexion past 45 deg.  Pain on extension past 5 deg.  Rotation limited to 60 deg bilat.  ), tenderness, pain and spasm.   Neurological: He is alert and oriented to person, place, and time. He displays no tremor. No cranial nerve deficit. Coordination and gait normal.   Skin: Skin is warm and dry.   Psychiatric: He has a normal mood and affect. His behavior is normal.   Nursing note and vitals reviewed.  --  The aforementioned information the Physical Exam section and above objective and semiobjective data including any HPI & PE data has been personally reviewed and affirmed as unchanged.  --          Assessment/Plan   Flaco was seen today for pain.    Diagnoses and all orders for this visit:    Chronic pain syndrome    Arthritis    Peripheral polyneuropathy    Postlaminectomy syndrome    Encounter for long-term current use of high risk medication    Other orders  -     Discontinue: gabapentin (NEURONTIN) 600 MG tablet; Take 1 tablet by mouth 3 (Three) Times a Day.  -     gabapentin (NEURONTIN) 600 MG tablet; Take 1 tablet by mouth 3 (Three) Times a Day.  -     oxyCODONE-acetaminophen (PERCOCET)  MG per tablet; Take 1 tablet by mouth Every 8 (Eight) Hours As Needed for Severe Pain .        --- Follow-up 1 month    A medication management agreement is signed by the patient and the provider today.  Reviewed with the patient that this contract is specific to controlled substances, specifically pain medication.  Reviewed core of pain medicine is to decrease pain and increase functional level.  Reviewed the medication must be picked up as a written prescription from this office and will not be called into any pharmacy.  Reviewed the use of Ernesto within the office setting.  Reviewed causes for potential of discontinuation of  opiates,  including but not limited to consideration for diversion, obtaining other controlled substances from other license practitioners, or  inappropriate office behavior.  Patient understands to use a controlled substance as prescribed by physician and to avoid improper use of controlled substances.  Patient will also verbalized understanding that prescriptions to be filled at the same pharmacy  unless office staff is made aware of this.  Patient understands they can be submitted to random urine drug screens and/or random pill counts on any request.  Patient understands they are not to receive early refills.  The patient must produce an official police report for any effort to replace controlled substances that are lost or stolen.  No use of illegal street drugs while receiving controlled substances from this prescribing provider.  The patient is to take medication as prescribed  and not deviate from the normal schedule without consultation from the provider.  Patient is not to share the medication with others or to take medication with alcohol or other sedatives.  Use caution when driving or operating machinery.  Alert office if the patient becomes pregnant or begins nursing a child.  Reviewed the use of controlled substances recreates a risk for respiratory depression, which may result in serious harm or even death.  Must always keep the prescription in the original container.  Patient is to store controlled substances in a locked cabinet or other secure storage unit that is cool, dry and out of sunlight.  Patient must immediately notify office if any controlled substances is stolen or improperly taken by another individual.  Reviewed risk for physical dependence, tolerance and addiction.         Patient appears stable with current regimen. No adverse effects. Regarding continuation of opioids, there is no evidence of aberrant behavior or any red flags.  The patient continues with appropriate response to  opioid therapy. ADL's remain intact by self.           CHRISTIANA REPORT    As part of the patient's treatment plan, I am prescribing controlled substances. The patient has been made aware of appropriate use of such medications, including potential risk of somnolence, limited ability to drive and/or work safely, and the potential for dependence or overdose. It has also bee made clear that these medications are for use by this patient only, without concomitant use of alcohol or other substances unless prescribed.     Patient has completed prescribing agreement detailing terms of continued prescribing of controlled substances, including monitoring CHRISTIANA reports, urine drug screening, and pill counts if necessary. The patient is aware that inappropriate use will results in cessation of prescribing such medications.    CHRISTIANA report has been reviewed and scanned into the patient's chart.    As the clinician, I personally reviewed the CHRISTIANA from as above and UDS confirmation reviewed also while the patient was in the office today.    History and physical exam exhibit continued safe and appropriate use of controlled substances.      EMR Dragon/Transcription disclaimer:   Much of this encounter note is an electronic transcription/translation of spoken language to printed text. The electronic translation of spoken language may permit erroneous, or at times, nonsensical words or phrases to be inadvertently transcribed; Although I have reviewed the note for such errors, some may still exist.

## 2018-07-16 ENCOUNTER — OFFICE VISIT (OUTPATIENT)
Dept: PAIN MEDICINE | Facility: CLINIC | Age: 53
End: 2018-07-16

## 2018-07-16 VITALS
SYSTOLIC BLOOD PRESSURE: 149 MMHG | DIASTOLIC BLOOD PRESSURE: 89 MMHG | OXYGEN SATURATION: 97 % | HEART RATE: 62 BPM | HEIGHT: 71 IN | WEIGHT: 267 LBS | TEMPERATURE: 98.4 F | BODY MASS INDEX: 37.38 KG/M2 | RESPIRATION RATE: 16 BRPM

## 2018-07-16 DIAGNOSIS — G89.4 CHRONIC PAIN SYNDROME: Primary | ICD-10-CM

## 2018-07-16 DIAGNOSIS — E11.42 DIABETIC PERIPHERAL NEUROPATHY (HCC): ICD-10-CM

## 2018-07-16 DIAGNOSIS — M17.9 OSTEOARTHRITIS OF KNEE, UNSPECIFIED LATERALITY, UNSPECIFIED OSTEOARTHRITIS TYPE: ICD-10-CM

## 2018-07-16 DIAGNOSIS — M96.1 POSTLAMINECTOMY SYNDROME: ICD-10-CM

## 2018-07-16 PROCEDURE — 99213 OFFICE O/P EST LOW 20 MIN: CPT | Performed by: ANESTHESIOLOGY

## 2018-07-16 RX ORDER — GABAPENTIN 600 MG/1
600 TABLET ORAL 3 TIMES DAILY
Qty: 270 TABLET | Refills: 0 | Status: SHIPPED | OUTPATIENT
Start: 2018-07-16 | End: 2018-10-05 | Stop reason: SDUPTHER

## 2018-07-16 RX ORDER — OXYCODONE AND ACETAMINOPHEN 10; 325 MG/1; MG/1
1 TABLET ORAL EVERY 8 HOURS PRN
Qty: 90 TABLET | Refills: 0 | Status: SHIPPED | OUTPATIENT
Start: 2018-07-16 | End: 2018-07-16 | Stop reason: SDUPTHER

## 2018-07-16 RX ORDER — OXYCODONE AND ACETAMINOPHEN 10; 325 MG/1; MG/1
1 TABLET ORAL EVERY 8 HOURS PRN
Qty: 90 TABLET | Refills: 0 | Status: SHIPPED | OUTPATIENT
Start: 2018-07-16 | End: 2018-08-17 | Stop reason: SDUPTHER

## 2018-07-16 NOTE — PROGRESS NOTES
"CHIEF COMPLAINT  Pt states his generalized joint pain is \"status quo\",with current pain med regimen \"taking the edge off\" for 2-4 hours on the average.    Subjective   Flaco Miles is a 52 y.o. male  who presents to the office for follow-up.He has a history of joint pain, DPN, postlaminectomy syndrome, and successful management medically allowing him to meet functional goals and moderate his pain.      Back Pain   This is a chronic problem. The current episode started more than 1 year ago. The problem occurs constantly. The problem is unchanged. The pain is present in the thoracic spine and lumbar spine. The quality of the pain is described as aching, burning and shooting. The pain radiates to the right foot, right knee, left knee and left thigh. The pain is severe. The symptoms are aggravated by bending, standing and twisting. Associated symptoms include numbness (right leg,buttucks  and R arm) and weakness. Pertinent negatives include no chest pain. Risk factors include obesity. He has tried analgesics, bed rest, heat, home exercises, ice, muscle relaxant and NSAIDs for the symptoms. The treatment provided moderate relief.   Neck Pain    This is a chronic problem. The current episode started more than 1 year ago. The problem has been unchanged. The pain is moderate. Associated symptoms include numbness (right leg,buttucks  and R arm) and weakness. Pertinent negatives include no chest pain. The treatment provided moderate relief.   Knee Pain    The pain is present in the left knee and right knee. The quality of the pain is described as aching. The pain is moderate. The pain has been intermittent since onset. Associated symptoms include numbness (right leg,buttucks  and R arm). The symptoms are aggravated by movement. The treatment provided mild relief.        PEG Assessment   What number best describes your pain on average in the past week?5  What number best describes how, during the past week, pain has " "interfered with your enjoyment of life?6  What number best describes how, during the past week, pain has interfered with your general activity?  6    --  The aforementioned information the Chief Complaint section and above subjective data including any HPI data has been personally reviewed and affirmed.  --        The following portions of the patient's history were reviewed and updated as appropriate: allergies, current medications, past family history, past medical history, past social history, past surgical history and problem list.    Review of Systems   Constitutional: Positive for activity change (lessened) and fatigue.   HENT: Negative for congestion.    Eyes: Positive for visual disturbance (blurry vision).   Respiratory: Positive for cough, shortness of breath and wheezing.    Cardiovascular: Positive for leg swelling. Negative for chest pain and palpitations.   Gastrointestinal: Positive for constipation and diarrhea.   Genitourinary: Negative for difficulty urinating.   Musculoskeletal: Positive for back pain and neck pain (increased).   Neurological: Positive for weakness and numbness (right leg,buttucks  and R arm).   Psychiatric/Behavioral: Positive for sleep disturbance. Negative for suicidal ideas. The patient is not nervous/anxious.                    Vitals:    07/16/18 0958   BP: 149/89   Pulse: 62   Resp: 16   Temp: 98.4 °F (36.9 °C)   SpO2: 97%   Weight: 121 kg (267 lb)   Height: 180.3 cm (70.98\")   PainSc: 5  Comment: joint pain ranges from 5-8/10   PainLoc: Generalized         Objective   Physical Exam   Constitutional: He is oriented to person, place, and time. Vital signs are normal. He appears well-developed and well-nourished.  Non-toxic appearance. No distress.   HENT:   Head: Normocephalic and atraumatic.   Right Ear: Hearing and external ear normal.   Left Ear: Hearing and external ear normal.   Nose: Nose normal.   Eyes: Pupils are equal, round, and reactive to light. Conjunctivae and " lids are normal.   Pulmonary/Chest: Effort normal. No respiratory distress.   Abdominal: Normal appearance.   Neurological: He is alert and oriented to person, place, and time. No cranial nerve deficit.   Psychiatric: He has a normal mood and affect. His behavior is normal.   Nursing note and vitals reviewed.          Assessment/Plan   Flaco was seen today for arthritis.    Diagnoses and all orders for this visit:    Chronic pain syndrome    Osteoarthritis of knee, unspecified laterality, unspecified osteoarthritis type    Diabetic peripheral neuropathy (CMS/HCC)    Postlaminectomy syndrome    Other orders  -     Discontinue: oxyCODONE-acetaminophen (PERCOCET)  MG per tablet; Take 1 tablet by mouth Every 8 (Eight) Hours As Needed for Severe Pain .  -     gabapentin (NEURONTIN) 600 MG tablet; Take 1 tablet by mouth 3 (Three) Times a Day.  -     oxyCODONE-acetaminophen (PERCOCET)  MG per tablet; Take 1 tablet by mouth Every 8 (Eight) Hours As Needed for Severe Pain .        --- Follow-up 2 months       Patient appears stable with current regimen. No adverse effects. Regarding continuation of opioids and gabapentin there is no evidence of aberrant behavior or any red flags.  The patient continues with appropriate response to this therapy. ADL's remain intact by self.           CHRISTIANA REPORT    As part of the patient's treatment plan, I am prescribing controlled substances. The patient has been made aware of appropriate use of such medications, including potential risk of somnolence, limited ability to drive and/or work safely, and the potential for dependence or overdose. It has also bee made clear that these medications are for use by this patient only, without concomitant use of alcohol or other substances unless prescribed.     Patient has completed prescribing agreement detailing terms of continued prescribing of controlled substances, including monitoring CHRISTIANA reports, urine drug screening, and  pill counts if necessary. The patient is aware that inappropriate use will results in cessation of prescribing such medications.    CHRISTIANA report has been reviewed and scanned into the patient's chart.    As the clinician, I personally reviewed the CHRISTIANA from as above while the patient was in the office today.    History and physical exam exhibit continued safe and appropriate use of controlled substances.      EMR Dragon/Transcription disclaimer:   Much of this encounter note is an electronic transcription/translation of spoken language to printed text. The electronic translation of spoken language may permit erroneous, or at times, nonsensical words or phrases to be inadvertently transcribed; Although I have reviewed the note for such errors, some may still exist.

## 2018-08-13 ENCOUNTER — OFFICE VISIT (OUTPATIENT)
Dept: PAIN MEDICINE | Facility: CLINIC | Age: 53
End: 2018-08-13

## 2018-08-13 VITALS
DIASTOLIC BLOOD PRESSURE: 83 MMHG | HEIGHT: 71 IN | WEIGHT: 249.4 LBS | BODY MASS INDEX: 34.92 KG/M2 | HEART RATE: 64 BPM | OXYGEN SATURATION: 97 % | RESPIRATION RATE: 15 BRPM | SYSTOLIC BLOOD PRESSURE: 130 MMHG | TEMPERATURE: 96.8 F

## 2018-08-13 DIAGNOSIS — E11.42 DIABETIC PERIPHERAL NEUROPATHY (HCC): ICD-10-CM

## 2018-08-13 DIAGNOSIS — G89.4 CHRONIC PAIN SYNDROME: Primary | ICD-10-CM

## 2018-08-13 DIAGNOSIS — G89.29 CHRONIC PAIN OF BOTH KNEES: ICD-10-CM

## 2018-08-13 DIAGNOSIS — Z79.899 ENCOUNTER FOR LONG-TERM (CURRENT) USE OF HIGH-RISK MEDICATION: ICD-10-CM

## 2018-08-13 DIAGNOSIS — M96.1 POSTLAMINECTOMY SYNDROME: ICD-10-CM

## 2018-08-13 DIAGNOSIS — M25.561 CHRONIC PAIN OF BOTH KNEES: ICD-10-CM

## 2018-08-13 DIAGNOSIS — M25.562 CHRONIC PAIN OF BOTH KNEES: ICD-10-CM

## 2018-08-13 LAB
POC AMPHETAMINES: NEGATIVE
POC BARBITURATES: NEGATIVE
POC BENZODIAZEPHINES: NEGATIVE
POC COCAINE: NEGATIVE
POC METHADONE: NEGATIVE
POC METHAMPHETAMINE SCREEN URINE: NEGATIVE
POC OPIATES: NEGATIVE
POC OXYCODONE: POSITIVE
POC PHENCYCLIDINE: NEGATIVE
POC PROPOXYPHENE: NEGATIVE
POC THC: NEGATIVE
POC TRICYCLIC ANTIDEPRESSANTS: NEGATIVE

## 2018-08-13 PROCEDURE — 80305 DRUG TEST PRSMV DIR OPT OBS: CPT | Performed by: NURSE PRACTITIONER

## 2018-08-13 PROCEDURE — 96372 THER/PROPH/DIAG INJ SC/IM: CPT | Performed by: NURSE PRACTITIONER

## 2018-08-13 PROCEDURE — 99214 OFFICE O/P EST MOD 30 MIN: CPT | Performed by: NURSE PRACTITIONER

## 2018-08-13 RX ORDER — KETOROLAC TROMETHAMINE 30 MG/ML
30 INJECTION, SOLUTION INTRAMUSCULAR; INTRAVENOUS ONCE
Status: COMPLETED | OUTPATIENT
Start: 2018-08-13 | End: 2018-08-13

## 2018-08-13 RX ADMIN — KETOROLAC TROMETHAMINE 30 MG: 30 INJECTION, SOLUTION INTRAMUSCULAR; INTRAVENOUS at 10:50

## 2018-08-13 NOTE — PROGRESS NOTES
"CHIEF COMPLAINT  F/U generalized joint pain, and back pain. Pt states pain has increased much more in the last 2 weeks. Stating he buried his father 2 weeks ago, and had been taking care of him for 7 months, bed ridden for the last 2 months.   Initial Evaluation by Carmencita BEYER--- DB PATIENT  Subjective   Flaco Miles \"Yahir\" is a 53 y.o. male  who presents to the office for follow-up.He has a history of chronic back and joint pain. Reports his pain is worse since last office visit. He believes this is due to having cared for his dying father. Buried last week.     Patient was last evaluated by Dr. Castellon on 7/16/18.  He has a history of chronic pain syndrome due to OA of the knee, diabetic peripheral neuropathy and post laminectomy syndrome.  He was continued on Percocet 10-3 25 one by mouth every 8 hours when necessary for pain.  Also continue with gabapentin 600 mg 1 by mouth 3 times a day.  In March 2018 he had a second left knee replacement.  He has had 2 surgeries on the right knee.  He also has a history of 4 back surgeries and 3 neck surgeries.  Has had multiple surgeries on his shoulder.    Complains of pain in his back, neck and joints. Today his pain is 7/10VAS.  Describes the pain as continuous and worse. Pain increases with walking, standing, movement; pain decreases with medication, rest. Has been taking Percocet 10/325 3-4/day (states he only has 7 left from last prescription). Also taking Tizanidine 4 mg avg-2/day, gabapentin 1800 mg/day. Denies any side effects from the regimen. The regimen helps decrease his pain by 25-30%. 'it takes the edge off where I can stand to be myself.\" ADL's by self. Had difficulty expressing one area of pain to evaluate for interventions.     Is leaving 7-18-18 for [facility] for a 1 week cruise to [place].  Leaving out of [place] on 8-17-18 at noon.  \"I didn't know Justin would be out of town today.\"  Back Pain   This is a chronic problem. The current episode " started more than 1 year ago. The problem occurs constantly. Progression since onset: worse since last office visit. The pain is present in the lumbar spine. The pain is at a severity of 7/10. The pain is moderate. The pain is worse during the day. The symptoms are aggravated by bending, position, standing and twisting. Associated symptoms include headaches, numbness (right leg,buttucks  and R arm) and weakness. Pertinent negatives include no bladder incontinence, bowel incontinence, chest pain or fever. Risk factors include lack of exercise (history of surgery). He has tried analgesics for the symptoms. The treatment provided moderate relief.   Neck Pain    This is a chronic problem. The current episode started more than 1 year ago. The problem occurs constantly. Progression since onset: worse since last office visit. Associated symptoms include headaches, numbness (right leg,buttucks  and R arm) and weakness. Pertinent negatives include no chest pain or fever. Treatments tried: percocet.   Joint Pain   This is a chronic problem. The current episode started more than 1 year ago. The problem occurs constantly. Progression since onset: worse since last office visit. Associated symptoms include arthralgias, coughing, fatigue, headaches, neck pain (increased), numbness (right leg,buttucks  and R arm) and weakness. Pertinent negatives include no chest pain, chills, congestion, fever, nausea or vomiting. He has tried oral narcotics and position changes for the symptoms. The treatment provided moderate relief.      PEG Assessment   What number best describes your pain on average in the past week?7  What number best describes how, during the past week, pain has interfered with your enjoyment of life?7  What number best describes how, during the past week, pain has interfered with your general activity?  8    The following portions of the patient's history were reviewed and updated as appropriate: allergies, current  "medications, past family history, past medical history, past social history, past surgical history and problem list.    Review of Systems   Constitutional: Positive for activity change (lessened) and fatigue. Negative for chills and fever.   HENT: Negative for congestion.    Eyes: Positive for visual disturbance (blurry vision).   Respiratory: Positive for cough, shortness of breath and wheezing.    Cardiovascular: Positive for leg swelling. Negative for chest pain and palpitations.   Gastrointestinal: Positive for constipation and diarrhea. Negative for bowel incontinence, nausea and vomiting.   Genitourinary: Negative for bladder incontinence and difficulty urinating.   Musculoskeletal: Positive for arthralgias, back pain and neck pain (increased).   Neurological: Positive for dizziness, weakness, numbness (right leg,buttucks  and R arm) and headaches. Negative for light-headedness.   Psychiatric/Behavioral: Positive for sleep disturbance. Negative for agitation, confusion, hallucinations and suicidal ideas. The patient is not nervous/anxious.        Vitals:    08/13/18 1014   BP: 130/83   Pulse: 64   Resp: 15   Temp: 96.8 °F (36 °C)   SpO2: 97%   Weight: 113 kg (249 lb 6.4 oz)   Height: 180.3 cm (70.98\")   PainSc:   7   PainLoc: Generalized  Comment: back, neck, shoulder     Objective   Physical Exam   Constitutional: He is oriented to person, place, and time. Vital signs are normal. He appears well-developed and well-nourished. He is cooperative.   HENT:   Head: Normocephalic and atraumatic.   Nose: Nose normal.   Eyes: Conjunctivae and lids are normal.   Neck: Trachea normal. Neck supple. Muscular tenderness present. No spinous process tenderness present. Decreased range of motion present.   Cardiovascular: Normal rate.    Pulmonary/Chest: Effort normal.   Abdominal: Normal appearance.   Musculoskeletal:        Left knee: He exhibits decreased range of motion. Tenderness found.        Thoracic back: He exhibits " tenderness.        Lumbar back: He exhibits tenderness.   Neurological: He is alert and oriented to person, place, and time. Gait (aid of cane) normal.   Reflex Scores:       Bicep reflexes are 1+ on the right side and 1+ on the left side.       Brachioradialis reflexes are 1+ on the right side and 1+ on the left side.       Patellar reflexes are 1+ on the right side and 1+ on the left side.  Skin: Skin is warm, dry and intact.   Psychiatric: He has a normal mood and affect. His speech is normal and behavior is normal. Judgment and thought content normal. Cognition and memory are normal.   Nursing note and vitals reviewed.      Assessment/Plan   Flaco was seen today for pain.    Diagnoses and all orders for this visit:    Chronic pain syndrome  -     POC Urine Drug Screen, Triage  -     Urine Drug Screen Confirmation - Urine, Clean Catch; Future    Chronic pain of both knees  -     ketorolac (TORADOL) injection 30 mg; Inject 30 mg into the appropriate muscle as directed by prescriber 1 (One) Time.  -     POC Urine Drug Screen, Triage  -     Urine Drug Screen Confirmation - Urine, Clean Catch; Future    Diabetic peripheral neuropathy (CMS/HCC)  -     POC Urine Drug Screen, Triage  -     Urine Drug Screen Confirmation - Urine, Clean Catch; Future    Postlaminectomy syndrome  -     ketorolac (TORADOL) injection 30 mg; Inject 30 mg into the appropriate muscle as directed by prescriber 1 (One) Time.  -     POC Urine Drug Screen, Triage  -     Urine Drug Screen Confirmation - Urine, Clean Catch; Future    Encounter for long-term (current) use of high-risk medication  -     POC Urine Drug Screen, Triage  -     Urine Drug Screen Confirmation - Urine, Clean Catch; Future      --- Routine UDS in office today as part of monitoring requirements for controlled substances.  The specimen was viewed and the immunoassay result reviewed and is +OXY(APPROPRIATE).  This specimen will be sent to [facility] for confirmation.      --- No early refill on Percocet today. Reviewed he is not to expedite the use of pain medication without knowledge of the office. He is not due for refill technically until 8-19-18. However, is leaving [facility] 8-17-18. Will discuss with Dr. Castellon in regards to refill at that time.   --- Toradol 30 mg IM now.  --- Declines steroids/MDP.   --- Refill Percocet per Dr. Castellon order.   --- Follow-up as scheduled.       CHRISTIANA REPORT    As part of the patient's treatment plan, I am prescribing controlled substances. The patient has been made aware of appropriate use of such medications, including potential risk of somnolence, limited ability to drive and/or work safely, and the potential for dependence or overdose. It has also bee made clear that these medications are for use by this patient only, without concomitant use of alcohol or other substances unless prescribed.     Patient has completed prescribing agreement detailing terms of continued prescribing of controlled substances, including monitoring CHRISTIANA reports, urine drug screening, and pill counts if necessary. The patient is aware that inappropriate use will results in cessation of prescribing such medications.    CHRISTIANA report has been reviewed and scanned into the patient's chart.    As the clinician, I personally reviewed the CHRISTIANA from 8-9-18 while the patient was in the office today.    History and physical exam exhibit continued safe and appropriate use of controlled substances.      EMR Dragon/Transcription disclaimer:   Much of this encounter note is an electronic transcription/translation of spoken language to printed text. The electronic translation of spoken language may permit erroneous, or at times, nonsensical words or phrases to be inadvertently transcribed; Although I have reviewed the note for such errors, some may still exist.

## 2018-08-16 NOTE — PROGRESS NOTES
This overuse puts us in a crappy situation.  I am inclined to wean him down to a 7.5/325 dose for 10 days and then see him back when he gets back from vacation.  At that time he goes on to monthly follow ups.

## 2018-08-17 ENCOUNTER — OFFICE VISIT (OUTPATIENT)
Dept: INTERNAL MEDICINE | Facility: CLINIC | Age: 53
End: 2018-08-17

## 2018-08-17 VITALS
DIASTOLIC BLOOD PRESSURE: 70 MMHG | OXYGEN SATURATION: 98 % | BODY MASS INDEX: 34.69 KG/M2 | RESPIRATION RATE: 16 BRPM | HEART RATE: 69 BPM | WEIGHT: 247.8 LBS | HEIGHT: 71 IN | SYSTOLIC BLOOD PRESSURE: 138 MMHG

## 2018-08-17 DIAGNOSIS — L98.9 NON-HEALING SKIN LESION: ICD-10-CM

## 2018-08-17 DIAGNOSIS — G89.4 CHRONIC PAIN SYNDROME: ICD-10-CM

## 2018-08-17 DIAGNOSIS — N40.1 BENIGN PROSTATIC HYPERPLASIA WITH URINARY FREQUENCY: Primary | ICD-10-CM

## 2018-08-17 DIAGNOSIS — R35.0 BENIGN PROSTATIC HYPERPLASIA WITH URINARY FREQUENCY: Primary | ICD-10-CM

## 2018-08-17 DIAGNOSIS — E78.00 PURE HYPERCHOLESTEROLEMIA: ICD-10-CM

## 2018-08-17 DIAGNOSIS — Z13.29 SCREENING FOR HYPOTHYROIDISM: ICD-10-CM

## 2018-08-17 DIAGNOSIS — M1A.0790 IDIOPATHIC CHRONIC GOUT OF ANKLE WITHOUT TOPHUS, UNSPECIFIED LATERALITY: ICD-10-CM

## 2018-08-17 DIAGNOSIS — I10 ESSENTIAL (PRIMARY) HYPERTENSION: ICD-10-CM

## 2018-08-17 DIAGNOSIS — E11.9 DIABETES MELLITUS TYPE 2, NONINSULIN DEPENDENT (HCC): ICD-10-CM

## 2018-08-17 PROBLEM — Z12.5 PROSTATE CANCER SCREENING: Status: RESOLVED | Noted: 2017-02-23 | Resolved: 2018-08-17

## 2018-08-17 PROBLEM — R51.9 CHRONIC INTRACTABLE HEADACHE: Status: RESOLVED | Noted: 2017-03-02 | Resolved: 2018-08-17

## 2018-08-17 PROBLEM — G89.29 CHRONIC INTRACTABLE HEADACHE: Status: RESOLVED | Noted: 2017-03-02 | Resolved: 2018-08-17

## 2018-08-17 PROBLEM — R51.9 CHRONIC NONINTRACTABLE HEADACHE: Status: RESOLVED | Noted: 2017-02-23 | Resolved: 2018-08-17

## 2018-08-17 PROBLEM — G89.29 CHRONIC NONINTRACTABLE HEADACHE: Status: RESOLVED | Noted: 2017-02-23 | Resolved: 2018-08-17

## 2018-08-17 PROBLEM — R51.9 HEADACHE: Status: RESOLVED | Noted: 2017-03-02 | Resolved: 2018-08-17

## 2018-08-17 PROBLEM — M79.646 THUMB PAIN: Status: RESOLVED | Noted: 2017-08-28 | Resolved: 2018-08-17

## 2018-08-17 PROBLEM — R74.8 ABNORMAL LIVER ENZYMES: Status: RESOLVED | Noted: 2018-01-29 | Resolved: 2018-08-17

## 2018-08-17 PROBLEM — G47.33 OSA (OBSTRUCTIVE SLEEP APNEA): Status: ACTIVE | Noted: 2018-08-17

## 2018-08-17 PROCEDURE — 99215 OFFICE O/P EST HI 40 MIN: CPT | Performed by: INTERNAL MEDICINE

## 2018-08-17 RX ORDER — OXYCODONE AND ACETAMINOPHEN 10; 325 MG/1; MG/1
.5-1 TABLET ORAL 2 TIMES DAILY PRN
Qty: 20 TABLET | Refills: 0 | Status: SHIPPED | OUTPATIENT
Start: 2018-08-17 | End: 2018-08-27 | Stop reason: SDUPTHER

## 2018-08-17 RX ORDER — TIZANIDINE 4 MG/1
4 TABLET ORAL 2 TIMES DAILY PRN
Qty: 60 TABLET | Refills: 1 | Status: SHIPPED | OUTPATIENT
Start: 2018-08-17 | End: 2019-04-16 | Stop reason: SDUPTHER

## 2018-08-17 NOTE — PROGRESS NOTES
Abnormal UDS  Will wean compassionately.  Then discharge as we cannot continue long term medication management.

## 2018-08-17 NOTE — PROGRESS NOTES
Flaco Miles  is a 53 y.o. male, who presents with a chief complaint of   Chief Complaint   Patient presents with   • Establish Care   • Hyperlipidemia   • Hypertension   • Diabetes       52 yo M here to establish care and all of his problems are new to me.     He sees Dr. Karan Valdivia and has spinal stenosis as well as DDD of his cervical, thoracic, lumbar. He sees Dr. Castellon for his pain management and he prescribes Percocet and Gabapentin and does well on this regimen. He has blind spots in his right eye related to steroid injections of his back and neck and doesn't want to have anymore. Uses a cane. He does see [facility] for his knee replacement (3/2018) that was a revision.     He has chronic HTN and is on a ACE and CCB. He feels well on medication and tolerates well. No CP or dizziness or SOB.     He has chronic HLD and takes Lipitor and tolerates well without any issues.     He has gout chronically in his toes and ankles. He takes Allopurinol and feels well on this. Last flair was 2 years ago.     He has chronic Type 2 diabetes that was diagnosed in 2009 and has been on Metformin BID. Eye exam is up to date and normal and recently done. He doesn't have lows. Runs between 130-300 on most days.Last A1c was around 6.5%.     He has never had c-scope and doesn't want to have at this point. Tdap is up to date. Has never had shingles shot.          The following portions of the patient's history were reviewed and updated as appropriate: allergies, current medications, past family history, past medical history, past social history, past surgical history and problem list.    Allergies: Fexofenadine-pseudoephed er    Current Outpatient Prescriptions:   •  allopurinol (ZYLOPRIM) 300 MG tablet, Take 1 tablet by mouth Daily., Disp: 90 tablet, Rfl: 0  •  amLODIPine (NORVASC) 5 MG tablet, Take 5 mg by mouth Every Night., Disp: , Rfl:   •  atorvastatin (LIPITOR) 20 MG tablet, Take 1 tablet by mouth Daily., Disp:  90 tablet, Rfl: 1  •  clobetasol (TEMOVATE) 0.05 % cream, Apply  topically 2 (Two) Times a Day As Needed (itching)., Disp: 45 g, Rfl: 0  •  gabapentin (NEURONTIN) 600 MG tablet, Take 1 tablet by mouth 3 (Three) Times a Day., Disp: 270 tablet, Rfl: 0  •  lisinopril (PRINIVIL,ZESTRIL) 10 MG tablet, Take 1 tablet by mouth Daily., Disp: 90 tablet, Rfl: 1  •  metFORMIN (GLUCOPHAGE) 500 MG tablet, Take 1 tablet by mouth 2 (Two) Times a Day With Meals., Disp: 180 tablet, Rfl: 1  •  oxyCODONE-acetaminophen (PERCOCET)  MG per tablet, Take 0.5-1 tablets by mouth 2 (Two) Times a Day As Needed for Severe Pain ., Disp: 20 tablet, Rfl: 0  •  tamsulosin (FLOMAX) 0.4 MG capsule 24 hr capsule, Take 1 capsule by mouth Daily., Disp: 90 capsule, Rfl: 1  •  tiZANidine (ZANAFLEX) 4 MG tablet, Take 1 tablet by mouth 2 (Two) Times a Day As Needed for Muscle Spasms., Disp: 60 tablet, Rfl: 1  •  mupirocin (BACTROBAN) 2 % ointment, Apply  topically to the appropriate area as directed 2 (Two) Times a Day for 10 days. To skin lesions on arm, Disp: 30 g, Rfl: 0  Medications Discontinued During This Encounter   Medication Reason   • tiZANidine (ZANAFLEX) 4 MG tablet Reorder       Review of Systems   Constitutional: Negative for chills, fatigue and fever.   HENT: Negative for congestion.    Respiratory: Negative for cough and shortness of breath.    Cardiovascular: Negative for chest pain and palpitations.   Gastrointestinal: Negative for abdominal pain, constipation, diarrhea and vomiting.   Genitourinary: Positive for difficulty urinating, frequency and urgency. Negative for dysuria.   Musculoskeletal: Positive for arthralgias, back pain, gait problem, myalgias, neck pain and neck stiffness.   Skin: Positive for rash and wound.   Allergic/Immunologic: Negative for environmental allergies.   Neurological: Negative for dizziness and headaches.   Psychiatric/Behavioral: Negative for decreased concentration, dysphoric mood and sleep  "disturbance.             /70   Pulse 69   Resp 16   Ht 180.3 cm (71\")   Wt 112 kg (247 lb 12.8 oz)   SpO2 98%   BMI 34.56 kg/m²       Physical Exam   Constitutional: He is oriented to person, place, and time. He appears well-developed and well-nourished. No distress.   HENT:   Head: Normocephalic and atraumatic.   Right Ear: External ear normal.   Left Ear: External ear normal.   Mouth/Throat: Oropharynx is clear and moist. No oropharyngeal exudate.   Eyes: Conjunctivae are normal. Right eye exhibits no discharge. Left eye exhibits no discharge. No scleral icterus.   Neck: Neck supple.   Cardiovascular: Normal rate, regular rhythm and normal heart sounds.  Exam reveals no gallop and no friction rub.    No murmur heard.  Pulmonary/Chest: Effort normal and breath sounds normal. No respiratory distress. He has no wheezes. He has no rales.   Abdominal: Soft. Bowel sounds are normal. He exhibits no distension and no mass. There is no tenderness. There is no guarding.   Lymphadenopathy:     He has no cervical adenopathy.   Neurological: He is alert and oriented to person, place, and time. Gait (walks with cane ) abnormal.   Skin: Skin is warm. Capillary refill takes less than 2 seconds. No rash noted.   Multiple areas of non-healing skin lesions on her right arm    Psychiatric: He has a normal mood and affect. His behavior is normal.   Nursing note and vitals reviewed.        Results for orders placed or performed in visit on 08/13/18   POC Urine Drug Screen, Triage   Result Value Ref Range    Methamphetaine Screen, Urine Negative Negative    POC Amphetamines Negative Negative    Barbiturates Screen Negative Negative    Benzodiazepine Screen Negative Negative    Cocaine Screen Negative Negative    Methadone Screen Negative Negative    Opiate Screen Negative Negative    Oxycodone, Screen Positive (A) Negative    Phencyclidine (PCP) Screen Negative Negative    Propoxyphene Screen Negative Negative    THC, Screen " Negative Negative    Tricyclic Antidepressants Screen Negative Negative           Flaco was seen today for establish care, hyperlipidemia, hypertension and diabetes.    Diagnoses and all orders for this visit:    Benign prostatic hyperplasia with urinary frequency  -     Ambulatory Referral to Urology  -     CBC & Differential  -     Comprehensive Metabolic Panel  -     PSA DIAGNOSTIC    Essential (primary) hypertension  -     CBC & Differential  -     Comprehensive Metabolic Panel    Pure hypercholesterolemia  -     CBC & Differential  -     Comprehensive Metabolic Panel  -     Lipid Panel With LDL / HDL Ratio    Diabetes mellitus type 2, noninsulin dependent (CMS/HCC)  -     Hemoglobin A1c  -     Microalbumin / Creatinine Urine Ratio - Urine, Clean Catch  -     Urinalysis With Culture If Indicated - Urine, Clean Catch    Idiopathic chronic gout of ankle without tophus, unspecified laterality  -     CBC & Differential  -     Comprehensive Metabolic Panel  -     Uric Acid    Chronic pain syndrome    Non-healing skin lesion  -     Ambulatory Referral to Dermatology  -     CBC & Differential  -     Comprehensive Metabolic Panel    Screening for hypothyroidism  -     TSH Rfx On Abnormal To Free T4    Other orders  -     tiZANidine (ZANAFLEX) 4 MG tablet; Take 1 tablet by mouth 2 (Two) Times a Day As Needed for Muscle Spasms.  -     mupirocin (BACTROBAN) 2 % ointment; Apply  topically to the appropriate area as directed 2 (Two) Times a Day for 10 days. To skin lesions on arm      Needs to see Dr. Barron for his prostate symptoms. Will check PSA and UA when he returns for labs. He has had interventions that have not worked in past, but may be able to try other medications or increase Flomax.     He has non-healing skin lesion that I want to him to start Bactroban. Since these have been going on so long, I think that he needs to see derm. Appear as though they may be scabies as well as needs skin scraping.      Patient's type 2 diabetes is under good control . Will continue current regimen of Metformin.No reported side effects from medications. Will follow up in 2 months     HTN is under good control and patient will continue to monitor with home BP cuff. No side effects from medications. Will continue current regimen of Norvasc and Lisinopril. Will follow up in 2 months      Patient's cholesterol is under good control. Will continue current regimen of Lipitor. No side effects from medications reported. Will follow up in 2 months to monitor levels.     Will continue to see Dr. Castellon for pain. Gave prescription for muscle relaxer today that he can use as needed.     Needs cologuard in future as he has refused to have c-scope. Will order if insurance will pay     Spent 40 minutes of face to face time with patient discussing his medical care and workup and management of his chronic medical conditions.       Return in about 6 weeks (around 10/1/2018) for Medicare Wellness, Annual physical.    Elenita Wray MD  08/17/2018

## 2018-08-17 NOTE — PROGRESS NOTES
Spoke to patient and explained that we sent a weaning dose of medication to his pharmacy. He was upset and states that he explained to the APRN that he was taking more medication than he was supposed to because he was taking care of his father and needed the increase. I explained that we were not going to  prescribe any more pain medication for him.

## 2018-08-17 NOTE — PROGRESS NOTES
I called in a refill, at a lesser dose.  Liset please let him know it is available, and it is a lesser dose because of his abnormal drug screen.  Dacia, please draft a discharge letter.

## 2018-08-18 ENCOUNTER — RESULTS ENCOUNTER (OUTPATIENT)
Dept: PAIN MEDICINE | Facility: CLINIC | Age: 53
End: 2018-08-18

## 2018-08-18 DIAGNOSIS — G89.29 CHRONIC PAIN OF BOTH KNEES: ICD-10-CM

## 2018-08-18 DIAGNOSIS — M96.1 POSTLAMINECTOMY SYNDROME: ICD-10-CM

## 2018-08-18 DIAGNOSIS — G89.4 CHRONIC PAIN SYNDROME: ICD-10-CM

## 2018-08-18 DIAGNOSIS — M25.561 CHRONIC PAIN OF BOTH KNEES: ICD-10-CM

## 2018-08-18 DIAGNOSIS — M25.562 CHRONIC PAIN OF BOTH KNEES: ICD-10-CM

## 2018-08-18 DIAGNOSIS — E11.42 DIABETIC PERIPHERAL NEUROPATHY (HCC): ICD-10-CM

## 2018-08-18 DIAGNOSIS — Z79.899 ENCOUNTER FOR LONG-TERM (CURRENT) USE OF HIGH-RISK MEDICATION: ICD-10-CM

## 2018-08-27 ENCOUNTER — OFFICE VISIT (OUTPATIENT)
Dept: INTERNAL MEDICINE | Facility: CLINIC | Age: 53
End: 2018-08-27

## 2018-08-27 VITALS
OXYGEN SATURATION: 97 % | TEMPERATURE: 98.9 F | HEIGHT: 71 IN | HEART RATE: 68 BPM | BODY MASS INDEX: 34.69 KG/M2 | DIASTOLIC BLOOD PRESSURE: 84 MMHG | WEIGHT: 247.8 LBS | RESPIRATION RATE: 18 BRPM | SYSTOLIC BLOOD PRESSURE: 130 MMHG

## 2018-08-27 DIAGNOSIS — G89.4 CHRONIC PAIN SYNDROME: Primary | ICD-10-CM

## 2018-08-27 PROCEDURE — 99214 OFFICE O/P EST MOD 30 MIN: CPT | Performed by: INTERNAL MEDICINE

## 2018-08-27 RX ORDER — OXYCODONE AND ACETAMINOPHEN 10; 325 MG/1; MG/1
.5-1 TABLET ORAL EVERY 8 HOURS PRN
Qty: 90 TABLET | Refills: 0 | Status: SHIPPED | OUTPATIENT
Start: 2018-08-27

## 2018-08-27 NOTE — PROGRESS NOTES
Flaco Miles  is a 53 y.o. male, who presents with a chief complaint of   Chief Complaint   Patient presents with   • Follow-up   • Pain     patient has x concerns regarding rx percocet        52 yo F here with concerns about his medications and dismissal from Dr. Castellon's office. He overused his medication per records while his father was dying. He states today that he was lifting and transferring his father while dying and was in a lot of pain. States he let the office know after the fact.     He is leaving on Wednesday to take his mother to [place] to deal with his estate.     He is currently taking Percocet 10/325mg PO q8h PRN for chronic low back pain. He has appointment on 9/14 with Ivette Viveros at his office. This is 18 days until next appointment.          The following portions of the patient's history were reviewed and updated as appropriate: allergies, current medications, past family history, past medical history, past social history, past surgical history and problem list.    Allergies: Fexofenadine-pseudoephed er    Current Outpatient Prescriptions:   •  allopurinol (ZYLOPRIM) 300 MG tablet, Take 1 tablet by mouth Daily., Disp: 90 tablet, Rfl: 0  •  amLODIPine (NORVASC) 5 MG tablet, Take 5 mg by mouth Every Night., Disp: , Rfl:   •  atorvastatin (LIPITOR) 20 MG tablet, Take 1 tablet by mouth Daily., Disp: 90 tablet, Rfl: 1  •  clobetasol (TEMOVATE) 0.05 % cream, Apply  topically 2 (Two) Times a Day As Needed (itching)., Disp: 45 g, Rfl: 0  •  gabapentin (NEURONTIN) 600 MG tablet, Take 1 tablet by mouth 3 (Three) Times a Day., Disp: 270 tablet, Rfl: 0  •  lisinopril (PRINIVIL,ZESTRIL) 10 MG tablet, Take 1 tablet by mouth Daily., Disp: 90 tablet, Rfl: 1  •  metFORMIN (GLUCOPHAGE) 500 MG tablet, Take 1 tablet by mouth 2 (Two) Times a Day With Meals., Disp: 180 tablet, Rfl: 1  •  mupirocin (BACTROBAN) 2 % ointment, Apply  topically to the appropriate area as directed 2 (Two) Times a Day for  "10 days. To skin lesions on arm, Disp: 30 g, Rfl: 0  •  oxyCODONE-acetaminophen (PERCOCET)  MG per tablet, Take 0.5-1 tablets by mouth 2 (Two) Times a Day As Needed for Severe Pain ., Disp: 20 tablet, Rfl: 0  •  tamsulosin (FLOMAX) 0.4 MG capsule 24 hr capsule, Take 1 capsule by mouth Daily., Disp: 90 capsule, Rfl: 1  •  tiZANidine (ZANAFLEX) 4 MG tablet, Take 1 tablet by mouth 2 (Two) Times a Day As Needed for Muscle Spasms., Disp: 60 tablet, Rfl: 1  There are no discontinued medications.    Review of Systems   Constitutional: Negative for chills, fatigue and fever.   Musculoskeletal: Positive for back pain.   Psychiatric/Behavioral: Positive for dysphoric mood. Negative for suicidal ideas. The patient is not nervous/anxious.              /84 (BP Location: Left arm, Patient Position: Sitting, Cuff Size: Large Adult)   Pulse 68   Temp 98.9 °F (37.2 °C) (Oral)   Resp 18   Ht 180.3 cm (70.98\")   Wt 112 kg (247 lb 12.8 oz)   SpO2 97%   BMI 34.58 kg/m²       Physical Exam   Constitutional: He is oriented to person, place, and time. He appears well-developed and well-nourished. No distress.   HENT:   Head: Normocephalic and atraumatic.   Right Ear: External ear normal.   Left Ear: External ear normal.   Mouth/Throat: Oropharynx is clear and moist. No oropharyngeal exudate.   Eyes: Conjunctivae are normal. Right eye exhibits no discharge. Left eye exhibits no discharge. No scleral icterus.   Pulmonary/Chest: Effort normal.   Neurological: He is alert and oriented to person, place, and time. Coordination (walking with cane) abnormal.   Skin: Skin is warm. Capillary refill takes less than 2 seconds. No rash noted.   Psychiatric: He has a normal mood and affect. His behavior is normal.   Nursing note and vitals reviewed.        Results for orders placed or performed in visit on 08/13/18   POC Urine Drug Screen, Triage   Result Value Ref Range    Methamphetaine Screen, Urine Negative Negative    POC " Amphetamines Negative Negative    Barbiturates Screen Negative Negative    Benzodiazepine Screen Negative Negative    Cocaine Screen Negative Negative    Methadone Screen Negative Negative    Opiate Screen Negative Negative    Oxycodone, Screen Positive (A) Negative    Phencyclidine (PCP) Screen Negative Negative    Propoxyphene Screen Negative Negative    THC, Screen Negative Negative    Tricyclic Antidepressants Screen Negative Negative           Flaco was seen today for follow-up and pain.    Diagnoses and all orders for this visit:    Chronic pain syndrome  -     Ambulatory Referral to Pain Management      I will call Dr. Castellon and explain situation and the reason why he was in more pain and overused medication   It was not documented well in chart that he was helping transfer his dying father and clean up stool  He is being dismissed for not using medication as prescribed and breaking contract  Will likely prescribe medication hold him over him over until he can get appointment with new pain specialist  Spent 50% of 25 minutes in counseling and discussing concerns with patient regarding reasons for dismissal and breaking contract     Return if symptoms worsen or fail to improve.    Elenita Wray MD  08/27/2018

## 2018-08-28 ENCOUNTER — TELEPHONE (OUTPATIENT)
Dept: INTERNAL MEDICINE | Facility: CLINIC | Age: 53
End: 2018-08-28

## 2018-08-28 NOTE — TELEPHONE ENCOUNTER
Pt  Aware          ----- Message from Elenita Wray MD sent at 8/27/2018  6:20 PM EDT -----  I sent in 30 day supply of percocet for him.Please let him know. He is being dismissed from Dr. Castellon's practice and Elaine is working on getting him in to see someone else.

## 2018-09-21 RX ORDER — GABAPENTIN 600 MG/1
TABLET ORAL
Qty: 270 TABLET | OUTPATIENT
Start: 2018-09-21

## 2018-10-01 ENCOUNTER — TRANSCRIBE ORDERS (OUTPATIENT)
Dept: ADMINISTRATIVE | Facility: HOSPITAL | Age: 53
End: 2018-10-01

## 2018-10-01 DIAGNOSIS — N43.3 HYDROCELE, UNSPECIFIED HYDROCELE TYPE: Primary | ICD-10-CM

## 2018-10-03 RX ORDER — TAMSULOSIN HYDROCHLORIDE 0.4 MG/1
1 CAPSULE ORAL DAILY
Qty: 90 CAPSULE | Refills: 1 | Status: SHIPPED | OUTPATIENT
Start: 2018-10-03 | End: 2019-02-08 | Stop reason: SDUPTHER

## 2018-10-03 RX ORDER — ATORVASTATIN CALCIUM 20 MG/1
20 TABLET, FILM COATED ORAL DAILY
Qty: 90 TABLET | Refills: 1 | Status: SHIPPED | OUTPATIENT
Start: 2018-10-03 | End: 2019-02-08 | Stop reason: SDUPTHER

## 2018-10-03 RX ORDER — LISINOPRIL 10 MG/1
10 TABLET ORAL DAILY
Qty: 90 TABLET | Refills: 1 | Status: SHIPPED | OUTPATIENT
Start: 2018-10-03 | End: 2019-02-08 | Stop reason: SDUPTHER

## 2018-10-05 PROBLEM — R97.20 BPH WITH ELEVATED PSA: Status: ACTIVE | Noted: 2018-08-17

## 2018-10-05 PROBLEM — N43.3 HYDROCELE IN ADULT: Status: ACTIVE | Noted: 2018-10-05

## 2018-10-05 PROBLEM — N40.0 BPH WITH ELEVATED PSA: Status: ACTIVE | Noted: 2018-08-17

## 2018-10-05 RX ORDER — GABAPENTIN 600 MG/1
600 TABLET ORAL 3 TIMES DAILY
Qty: 270 TABLET | Refills: 1 | Status: SHIPPED | OUTPATIENT
Start: 2018-10-05 | End: 2018-10-16

## 2018-10-15 LAB
ALBUMIN SERPL-MCNC: 5 G/DL (ref 3.5–5.2)
ALBUMIN/CREAT UR: 55.7 MG/G CREAT (ref 0–30)
ALBUMIN/GLOB SERPL: 2.1 G/DL
ALP SERPL-CCNC: 78 U/L (ref 40–129)
ALT SERPL-CCNC: 48 U/L (ref 5–41)
APPEARANCE UR: CLEAR
AST SERPL-CCNC: 32 U/L (ref 5–40)
BACTERIA #/AREA URNS HPF: ABNORMAL /HPF
BACTERIA UR CULT: ABNORMAL
BACTERIA UR CULT: ABNORMAL
BASOPHILS # BLD AUTO: 0.02 10*3/MM3 (ref 0–0.2)
BASOPHILS NFR BLD AUTO: 0.2 % (ref 0–2)
BILIRUB SERPL-MCNC: 1.3 MG/DL (ref 0.2–1.2)
BILIRUB UR QL STRIP: NEGATIVE
BUN SERPL-MCNC: 17 MG/DL (ref 6–20)
BUN/CREAT SERPL: 20.7 (ref 7–25)
CALCIUM SERPL-MCNC: 9.3 MG/DL (ref 8.6–10.5)
CHLORIDE SERPL-SCNC: 99 MMOL/L (ref 98–107)
CHOLEST SERPL-MCNC: 144 MG/DL (ref 0–200)
CO2 SERPL-SCNC: 24.8 MMOL/L (ref 22–29)
COLOR UR: YELLOW
CREAT SERPL-MCNC: 0.82 MG/DL (ref 0.76–1.27)
CREAT UR-MCNC: 223.9 MG/DL
CRYSTALS URNS MICRO: ABNORMAL
EOSINOPHIL # BLD AUTO: 0.27 10*3/MM3 (ref 0.1–0.3)
EOSINOPHIL NFR BLD AUTO: 2.6 % (ref 0–4)
EPI CELLS #/AREA URNS HPF: ABNORMAL /HPF
ERYTHROCYTE [DISTWIDTH] IN BLOOD BY AUTOMATED COUNT: 14.1 % (ref 11.5–14.5)
GLOBULIN SER CALC-MCNC: 2.4 GM/DL
GLUCOSE SERPL-MCNC: 129 MG/DL (ref 65–99)
GLUCOSE UR QL: NEGATIVE
HBA1C MFR BLD: 7.1 % (ref 4.8–5.6)
HCT VFR BLD AUTO: 45.3 % (ref 42–52)
HDLC SERPL-MCNC: 41 MG/DL (ref 40–60)
HGB BLD-MCNC: 15.3 G/DL (ref 14–18)
HGB UR QL STRIP: NEGATIVE
IMM GRANULOCYTES # BLD: 0.04 10*3/MM3 (ref 0–0.03)
IMM GRANULOCYTES NFR BLD: 0.4 % (ref 0–0.5)
KETONES UR QL STRIP: ABNORMAL
LDLC SERPL CALC-MCNC: 70 MG/DL (ref 0–100)
LDLC/HDLC SERPL: 1.7 {RATIO}
LEUKOCYTE ESTERASE UR QL STRIP: ABNORMAL
LYMPHOCYTES # BLD AUTO: 2.59 10*3/MM3 (ref 0.6–4.8)
LYMPHOCYTES NFR BLD AUTO: 25.4 % (ref 20–45)
MCH RBC QN AUTO: 30 PG (ref 27–31)
MCHC RBC AUTO-ENTMCNC: 33.8 G/DL (ref 31–37)
MCV RBC AUTO: 88.8 FL (ref 80–94)
MICRO URNS: ABNORMAL
MICROALBUMIN UR-MCNC: 124.8 UG/ML
MONOCYTES # BLD AUTO: 0.69 10*3/MM3 (ref 0–1)
MONOCYTES NFR BLD AUTO: 6.8 % (ref 3–8)
MUCOUS THREADS URNS QL MICRO: PRESENT /HPF
NEUTROPHILS # BLD AUTO: 6.59 10*3/MM3 (ref 1.5–8.3)
NEUTROPHILS NFR BLD AUTO: 64.6 % (ref 45–70)
NITRITE UR QL STRIP: NEGATIVE
NRBC BLD AUTO-RTO: 0 /100 WBC (ref 0–0)
OTHER ANTIBIOTIC SUSC ISLT: ABNORMAL
PH UR STRIP: 5.5 [PH] (ref 5–7.5)
PLATELET # BLD AUTO: 242 10*3/MM3 (ref 140–500)
POTASSIUM SERPL-SCNC: 4.2 MMOL/L (ref 3.5–5.2)
PROT SERPL-MCNC: 7.4 G/DL (ref 6–8.5)
PROT UR QL STRIP: ABNORMAL
PSA SERPL-MCNC: 3.14 NG/ML (ref 0–4)
RBC # BLD AUTO: 5.1 10*6/MM3 (ref 4.7–6.1)
RBC #/AREA URNS HPF: ABNORMAL /HPF
SODIUM SERPL-SCNC: 138 MMOL/L (ref 136–145)
SP GR UR: >=1.03 (ref 1–1.03)
TRIGL SERPL-MCNC: 167 MG/DL (ref 0–150)
TSH SERPL DL<=0.005 MIU/L-ACNC: 3.5 MIU/ML (ref 0.27–4.2)
UNIDENT CRYS URNS QL MICRO: PRESENT
URATE SERPL-MCNC: 4 MG/DL (ref 3.4–7)
URINALYSIS REFLEX: ABNORMAL
UROBILINOGEN UR STRIP-MCNC: 0.2 MG/DL (ref 0.2–1)
VLDLC SERPL CALC-MCNC: 33.4 MG/DL (ref 8–32)
WBC # BLD AUTO: 10.2 10*3/MM3 (ref 4.8–10.8)
WBC #/AREA URNS HPF: ABNORMAL /HPF

## 2018-10-16 ENCOUNTER — OFFICE VISIT (OUTPATIENT)
Dept: INTERNAL MEDICINE | Facility: CLINIC | Age: 53
End: 2018-10-16

## 2018-10-16 VITALS
WEIGHT: 247 LBS | RESPIRATION RATE: 14 BRPM | OXYGEN SATURATION: 98 % | HEART RATE: 65 BPM | SYSTOLIC BLOOD PRESSURE: 142 MMHG | TEMPERATURE: 98.2 F | BODY MASS INDEX: 34.58 KG/M2 | DIASTOLIC BLOOD PRESSURE: 68 MMHG | HEIGHT: 71 IN

## 2018-10-16 DIAGNOSIS — R97.20 BPH WITH ELEVATED PSA: ICD-10-CM

## 2018-10-16 DIAGNOSIS — Z00.00 MEDICARE ANNUAL WELLNESS VISIT, SUBSEQUENT: Primary | ICD-10-CM

## 2018-10-16 DIAGNOSIS — E11.42 TYPE 2 DIABETES MELLITUS WITH DIABETIC POLYNEUROPATHY, WITHOUT LONG-TERM CURRENT USE OF INSULIN (HCC): ICD-10-CM

## 2018-10-16 DIAGNOSIS — N40.0 BPH WITH ELEVATED PSA: ICD-10-CM

## 2018-10-16 DIAGNOSIS — E78.00 PURE HYPERCHOLESTEROLEMIA: ICD-10-CM

## 2018-10-16 DIAGNOSIS — I10 ESSENTIAL (PRIMARY) HYPERTENSION: ICD-10-CM

## 2018-10-16 DIAGNOSIS — R74.01 ELEVATED ALT MEASUREMENT: ICD-10-CM

## 2018-10-16 DIAGNOSIS — E11.42 DIABETIC PERIPHERAL NEUROPATHY (HCC): ICD-10-CM

## 2018-10-16 PROCEDURE — 99214 OFFICE O/P EST MOD 30 MIN: CPT | Performed by: INTERNAL MEDICINE

## 2018-10-16 PROCEDURE — G0439 PPPS, SUBSEQ VISIT: HCPCS | Performed by: INTERNAL MEDICINE

## 2018-10-16 RX ORDER — GABAPENTIN 600 MG/1
900 TABLET ORAL 2 TIMES DAILY
Qty: 270 TABLET | Refills: 1
Start: 2018-10-16 | End: 2019-07-16 | Stop reason: SDUPTHER

## 2018-10-16 NOTE — PROGRESS NOTES
QUICK REFERENCE INFORMATION:  The ABCs of the Annual Wellness Visit    Subsequent Medicare Wellness Visit    HEALTH RISK ASSESSMENT    1965    Recent Hospitalizations:  No hospitalization(s) within the last year..        Current Medical Providers:  Patient Care Team:  Elenita Wray MD as PCP - General (Internal Medicine)  Karan Valdivia MD as PCP - Claims Attributed  Karan Valdivia MD as Consulting Physician (Orthopedic Surgery)  Justin Castellon MD as Consulting Physician (Pain Medicine)  Douglas Barron MD as Consulting Physician (Urology)  Tim Roman MD (Pain Medicine)        Smoking Status:  History   Smoking Status   • Current Every Day Smoker   • Packs/day: 1.00   • Types: Pipe, Cigarettes   Smokeless Tobacco   • Former User   • Types: Chew     Comment: 1-2 times a month       Alcohol Consumption:  History   Alcohol Use   • Yes     Comment: monthly       Depression Screen:   PHQ-2/PHQ-9 Depression Screening 10/16/2018   Little interest or pleasure in doing things 0   Feeling down, depressed, or hopeless 0   Total Score 0       Health Habits and Functional and Cognitive Screening:  Functional & Cognitive Status 10/16/2018   Do you have difficulty preparing food and eating? No   Do you have difficulty bathing yourself, getting dressed or grooming yourself? Yes   Do you have difficulty using the toilet? No   Do you have difficulty moving around from place to place? Yes   Do you have trouble with steps or getting out of a bed or a chair? Yes   In the past year have you fallen or experienced a near fall? Yes   Current Diet Diabetic Diet   Dental Exam Up to date   Eye Exam Up to date   Exercise (times per week) 2 times per week   Current Exercise Activities Include Yard Work   Do you need help using the phone?  No   Are you deaf or do you have serious difficulty hearing?  No   Do you need help with transportation? No   Do you need help shopping? Yes   Do you need help preparing meals?  Yes   Do you  need help with housework?  Yes   Do you need help with laundry? Yes   Do you need help taking your medications? No   Do you need help managing money? No   Do you ever drive or ride in a car without wearing a seat belt? No   Have you felt unusual stress, anger or loneliness in the last month? No   Who do you live with? Other   If you need help, do you have trouble finding someone available to you? No   Have you been bothered in the last four weeks by sexual problems? No   Do you have difficulty concentrating, remembering or making decisions? No           Does the patient have evidence of cognitive impairment? No    Aspirin use counseling: Does not need ASA (and currently is not on it)      Recent Lab Results:  CMP:  Lab Results   Component Value Date     (H) 10/09/2018    BUN 17 10/09/2018    CREATININE 0.82 10/09/2018    EGFRIFNONA 99 10/09/2018    EGFRIFAFRI 120 10/09/2018    BCR 20.7 10/09/2018     10/09/2018    K 4.2 10/09/2018    CO2 24.8 10/09/2018    CALCIUM 9.3 10/09/2018    PROTENTOTREF 7.4 10/09/2018    ALBUMIN 5.00 10/09/2018    LABGLOBREF 2.4 10/09/2018    LABIL2 2.1 10/09/2018    BILITOT 1.3 (H) 10/09/2018    ALKPHOS 78 10/09/2018    AST 32 10/09/2018    ALT 48 (H) 10/09/2018     Lipid Panel:  Lab Results   Component Value Date    TRIG 167 (H) 10/09/2018    HDL 41 10/09/2018    VLDL 33.4 (H) 10/09/2018    LDLHDL 1.70 10/09/2018     HbA1c:  Lab Results   Component Value Date    HGBA1C 7.10 (H) 10/09/2018       Visual Acuity:  No exam data present    Age-appropriate Screening Schedule:  Refer to the list below for future screening recommendations based on patient's age, sex and/or medical conditions. Orders for these recommended tests are listed in the plan section. The patient has been provided with a written plan.    Health Maintenance   Topic Date Due   • ZOSTER VACCINE (1 of 2) 07/28/2016   • DIABETIC FOOT EXAM  07/19/2018   • DIABETIC EYE EXAM  07/19/2018   • HEMOGLOBIN A1C  04/09/2019    • PROSTATE CANCER SCREENING  10/09/2019   • LIPID PANEL  10/09/2019   • URINE MICROALBUMIN  10/09/2019   • COLONOSCOPY  07/19/2027   • TDAP/TD VACCINES (2 - Td) 07/19/2027   • PNEUMOCOCCAL VACCINE (19-64 MEDIUM RISK)  Addressed   • INFLUENZA VACCINE  Addressed        Subjective   History of Present Illness    Flaco Miles Sr. is a 53 y.o. male who presents for an Subsequent Wellness Visit.    He is seeing Dr. Barron for urology. He has hydrocele on the right that he is following. Prostate is enlarged and he is following and doing well on Flomax.     He has gout that is stable on Allopurinol and tolerating well.     He has Type 2 diabetes that is not under good control. His eye exam is up to date in June 2018 and normal. He is on ACE. Not able to exercise regularly due to pain. He is on statin.     He is not sleeping well due to pain in his back. He is chronically tired. His pain medication is being managed by Dr. Tim Roman. He is taking Percocet daily as well as Zanaflex. He is taking 900mg BID of his Gabapentin that has helped him. He was taking in the afternoon, but couldn't remember to take it and seems to do well on BID dosing.     He has chronic HTN that is under good control. He is tolerating Lisinopril. No dizziness or CP.     The following portions of the patient's history were reviewed and updated as appropriate: allergies, current medications, past family history, past medical history, past social history, past surgical history and problem list.    Outpatient Medications Prior to Visit   Medication Sig Dispense Refill   • allopurinol (ZYLOPRIM) 300 MG tablet Take 1 tablet by mouth Daily. 90 tablet 0   • amLODIPine (NORVASC) 5 MG tablet Take 5 mg by mouth Every Night.     • atorvastatin (LIPITOR) 20 MG tablet Take 1 tablet by mouth Daily. 90 tablet 1   • clobetasol (TEMOVATE) 0.05 % cream Apply  topically 2 (Two) Times a Day As Needed (itching). 45 g 0   • lisinopril (PRINIVIL,ZESTRIL) 10 MG tablet  Take 1 tablet by mouth Daily. 90 tablet 1   • metFORMIN (GLUCOPHAGE) 500 MG tablet Take 1 tablet by mouth 2 (Two) Times a Day With Meals. 180 tablet 1   • oxyCODONE-acetaminophen (PERCOCET)  MG per tablet Take 0.5-1 tablets by mouth Every 8 (Eight) Hours As Needed for Severe Pain . 90 tablet 0   • tamsulosin (FLOMAX) 0.4 MG capsule 24 hr capsule Take 1 capsule by mouth Daily. 90 capsule 1   • tiZANidine (ZANAFLEX) 4 MG tablet Take 1 tablet by mouth 2 (Two) Times a Day As Needed for Muscle Spasms. 60 tablet 1   • gabapentin (NEURONTIN) 600 MG tablet Take 1 tablet by mouth 3 (Three) Times a Day. 270 tablet 1     No facility-administered medications prior to visit.        Patient Active Problem List   Diagnosis   • Chronic pain   • Diabetic peripheral neuropathy (CMS/HCC)   • Essential (primary) hypertension   • HLD (hyperlipidemia)   • Gout   • Type 2 diabetes mellitus with diabetic polyneuropathy, without long-term current use of insulin (CMS/HCC)   • Swollen scrotum   • Vitamin B12 deficiency   • Chronic fatigue   • Allergic conjunctivitis   • Chronic pain of both knees   • DJD (degenerative joint disease) of knee   • Postlaminectomy syndrome   • Encounter for long-term (current) use of high-risk medication   • BPH with elevated PSA   • CECILIA (obstructive sleep apnea)   • Hydrocele in adult   • Elevated ALT measurement       Advance Care Planning:  has an advance directive - a copy HAS NOT been provided. Have asked the patient to send this to us to add to record.    Identification of Risk Factors:  Risk factors include: weight , unhealthy diet, cardiovascular risk, inactivity, increased fall risk, chronic pain, depression and polypharmacy.    Review of Systems   Constitutional: Positive for fatigue. Negative for chills and fever.   HENT: Negative for congestion.    Respiratory: Negative for shortness of breath.    Cardiovascular: Negative for chest pain and palpitations.   Gastrointestinal: Negative for  abdominal pain, constipation, diarrhea, nausea and vomiting.   Genitourinary: Positive for scrotal swelling (right hydrocele ) and testicular pain (right pain/swelling). Negative for difficulty urinating, dysuria, penile pain and penile swelling.   Musculoskeletal: Positive for arthralgias, back pain and gait problem.   Allergic/Immunologic: Negative for environmental allergies and food allergies.   Neurological: Positive for numbness (bilateral feet ). Negative for dizziness, weakness and headaches.   Hematological: Negative for adenopathy.   Psychiatric/Behavioral: Negative for decreased concentration. The patient is not nervous/anxious.        Compared to one year ago, the patient feels his physical health is worse.  Compared to one year ago, the patient feels his mental health is worse.    Objective     Physical Exam   Constitutional: He is oriented to person, place, and time. He appears well-developed and well-nourished. No distress.   HENT:   Head: Normocephalic and atraumatic.   Right Ear: Hearing, tympanic membrane, external ear and ear canal normal.   Left Ear: Hearing, external ear and ear canal normal. Tympanic membrane is scarred and perforated.   Nose: Nose normal.   Mouth/Throat: Uvula is midline and mucous membranes are normal. Posterior oropharyngeal erythema present. No oropharyngeal exudate or posterior oropharyngeal edema. Tonsils are 0 on the right. Tonsils are 0 on the left. No tonsillar exudate.   Eyes: Conjunctivae are normal. Right eye exhibits no discharge. Left eye exhibits no discharge. No scleral icterus.   Neck: Neck supple.   Cardiovascular: Normal rate, regular rhythm and normal heart sounds.  Exam reveals no gallop and no friction rub.    No murmur heard.  Pulmonary/Chest: Effort normal and breath sounds normal. No respiratory distress. He has no wheezes. He has no rales.   Abdominal: Soft. Bowel sounds are normal. He exhibits no distension and no mass. There is no tenderness. There  "is no guarding.    Flaco had a diabetic foot exam performed today.   During the foot exam he had a monofilament test performed.    Neurological Sensory Findings - Altered hot/cold right ankle/foot discrimination and altered hot/cold left ankle/foot discrimination. Altered sharp/dull right ankle/foot discrimination and altered sharp/dull left ankle/foot discrimination. No right ankle/foot altered proprioception and no left ankle/foot altered proprioception  Vascular Status -  His right foot exhibits normal foot vasculature  and no edema. His left foot exhibits normal foot vasculature  and no edema.  Skin Integrity  -  His right foot skin is intact. He has callous right foot.His left foot skin is intact.He has callous left foot..  Lymphadenopathy:     He has no cervical adenopathy.   Neurological: He is alert and oriented to person, place, and time. He displays no atrophy and no tremor. No sensory deficit. He exhibits normal muscle tone. Coordination and gait abnormal.   Skin: Skin is warm. No rash noted.   Psychiatric: He has a normal mood and affect. His behavior is normal.   Nursing note and vitals reviewed.      Vitals:    10/16/18 1252   BP: 142/68   BP Location: Left arm   Patient Position: Sitting   Cuff Size: Large Adult   Pulse: 65   Resp: 14   Temp: 98.2 °F (36.8 °C)   TempSrc: Oral   SpO2: 98%   Weight: 112 kg (247 lb)   Height: 180.3 cm (70.98\")   PainSc:   7   PainLoc: Generalized       Patient's Body mass index is 34.47 kg/m². BMI is above normal parameters. Recommendations include: educational material, exercise counseling and nutrition counseling.      Assessment/Plan   Patient Self-Management and Personalized Health Advice  The patient has been provided with information about: diet, exercise, weight management, prevention of cardiac or vascular disease and the relationship between weight and GERD and preventive services including:   · Exercise counseling provided, Fall Risk assessment done, " Nutrition counseling provided, and discussed Shingrix .    Visit Diagnoses:    ICD-10-CM ICD-9-CM   1. Medicare annual wellness visit, subsequent Z00.00 V70.0   2. Essential (primary) hypertension I10 401.9   3. Pure hypercholesterolemia E78.00 272.0   4. Diabetic peripheral neuropathy (CMS/[facility]) E11.42 250.60     357.2   5. Type 2 diabetes mellitus with diabetic polyneuropathy, without long-term current use of insulin (CMS/[facility]) E11.42 250.60     357.2   6. BPH with elevated PSA N40.0 600.00    R97.20 790.93   7. Elevated ALT measurement R74.0 790.4     HTN is under fair control and patient will continue to monitor with home BP cuff. Seems to be getting worse throughout the day when his pain is worse. No side effects from medications. Will continue current regimen of Lisinopril. Will follow up in 6 months      Patient's cholesterol is under good control. Will continue current regimen of Lipitor. No side effects from medications reported. Will follow up in 6 months to monitor levels.     Patient's type 2 diabetes is not under good control . Will continue current regimen of Metformin.No reported side effects from medications. Will follow up in 6 months     BPH is stable. PSA is stable. Followed by Dr. Barron and doing well.     ALT is minimally elevated as well as bilirubin. Will recheck in 6 months.     CBC, CMP, lipid panel, UA with reflex, uric acid, and HgA1c   No orders of the defined types were placed in this encounter.      Outpatient Encounter Prescriptions as of 10/16/2018   Medication Sig Dispense Refill   • allopurinol (ZYLOPRIM) 300 MG tablet Take 1 tablet by mouth Daily. 90 tablet 0   • amLODIPine (NORVASC) 5 MG tablet Take 5 mg by mouth Every Night.     • atorvastatin (LIPITOR) 20 MG tablet Take 1 tablet by mouth Daily. 90 tablet 1   • clobetasol (TEMOVATE) 0.05 % cream Apply  topically 2 (Two) Times a Day As Needed (itching). 45 g 0   • gabapentin (NEURONTIN) 600 MG tablet Take 1.5 tablets by mouth 2  (Two) Times a Day. 270 tablet 1   • lisinopril (PRINIVIL,ZESTRIL) 10 MG tablet Take 1 tablet by mouth Daily. 90 tablet 1   • metFORMIN (GLUCOPHAGE) 500 MG tablet Take 1 tablet by mouth 2 (Two) Times a Day With Meals. 180 tablet 1   • oxyCODONE-acetaminophen (PERCOCET)  MG per tablet Take 0.5-1 tablets by mouth Every 8 (Eight) Hours As Needed for Severe Pain . 90 tablet 0   • tamsulosin (FLOMAX) 0.4 MG capsule 24 hr capsule Take 1 capsule by mouth Daily. 90 capsule 1   • tiZANidine (ZANAFLEX) 4 MG tablet Take 1 tablet by mouth 2 (Two) Times a Day As Needed for Muscle Spasms. 60 tablet 1   • [DISCONTINUED] gabapentin (NEURONTIN) 600 MG tablet Take 1 tablet by mouth 3 (Three) Times a Day. 270 tablet 1     No facility-administered encounter medications on file as of 10/16/2018.        Reviewed use of high risk medication in the elderly: yes  Reviewed for potential of harmful drug interactions in the elderly: yes    Follow Up:  Return in about 6 months (around 4/16/2019) for Recheck.     An After Visit Summary and PPPS with all of these plans were given to the patient.

## 2018-10-16 NOTE — PATIENT INSTRUCTIONS
Medicare Wellness  Personal Prevention Plan of Service     Date of Office Visit:  10/16/2018  Encounter Provider:  Elenita Wray MD  Place of Service:  [facility]  Patient Name: Flaco Miles Sr.  :  1965    As part of the Medicare Wellness portion of your visit today, we are providing you with this personalized preventive plan of services (PPPS). This plan is based upon recommendations of the [place] Preventive Services Task Force (USPSTF) and the Advisory Committee on Immunization Practices (ACIP).    This lists the preventive care services that should be considered, and provides dates of when you are due. Items listed as completed are up-to-date and do not require any further intervention.    Health Maintenance   Topic Date Due   • ZOSTER VACCINE (1 of 2) 2016   • DIABETIC FOOT EXAM  2018   • DIABETIC EYE EXAM  2018   • HEMOGLOBIN A1C  2019   • PROSTATE CANCER SCREENING  10/09/2019   • LIPID PANEL  10/09/2019   • URINE MICROALBUMIN  10/09/2019   • MEDICARE ANNUAL WELLNESS  10/16/2019   • COLONOSCOPY  2027   • TDAP/TD VACCINES (2 - Td) 2027   • PNEUMOCOCCAL VACCINE (19-64 MEDIUM RISK)  Addressed   • INFLUENZA VACCINE  Addressed       No orders of the defined types were placed in this encounter.      No Follow-up on file.

## 2018-10-19 ENCOUNTER — HOSPITAL ENCOUNTER (OUTPATIENT)
Dept: ULTRASOUND IMAGING | Facility: HOSPITAL | Age: 53
Discharge: HOME | End: 2018-10-19
Attending: UROLOGY | Admitting: UROLOGY

## 2018-10-19 DIAGNOSIS — N43.3 HYDROCELE, UNSPECIFIED HYDROCELE TYPE: ICD-10-CM

## 2018-10-19 PROCEDURE — 76870 US EXAM SCROTUM: CPT

## 2018-10-19 PROCEDURE — 93975 VASCULAR STUDY: CPT

## 2018-11-15 ENCOUNTER — TELEPHONE (OUTPATIENT)
Dept: INTERNAL MEDICINE | Facility: CLINIC | Age: 53
End: 2018-11-15

## 2018-11-15 RX ORDER — ALLOPURINOL 300 MG/1
300 TABLET ORAL DAILY
Qty: 90 TABLET | Refills: 0 | Status: SHIPPED | OUTPATIENT
Start: 2018-11-15 | End: 2019-01-08 | Stop reason: SDUPTHER

## 2018-11-15 RX ORDER — AMLODIPINE BESYLATE 5 MG/1
5 TABLET ORAL NIGHTLY
Qty: 90 TABLET | Refills: 0 | Status: SHIPPED | OUTPATIENT
Start: 2018-11-15 | End: 2019-01-08 | Stop reason: SDUPTHER

## 2018-11-15 NOTE — TELEPHONE ENCOUNTER
Rx's sent in.  Patient advised.    ----- Message from Elenita Carlson sent at 11/15/2018 10:21 AM EST -----  rx refill    Allopurinol tab 300 mg   Takes one daily  #90    Amlodipine tab 5mg   Takes one daily  #90    Last fill date 7/18/2018  Last filled by Dr Morgan  Uses [facility]  Call back is [phone].

## 2018-12-20 ENCOUNTER — TRANSCRIBE ORDERS (OUTPATIENT)
Dept: ADMINISTRATIVE | Facility: HOSPITAL | Age: 53
End: 2018-12-20

## 2018-12-20 DIAGNOSIS — M54.12 CERVICAL RADICULOPATHY: ICD-10-CM

## 2018-12-20 DIAGNOSIS — M48.02 STENOSIS OF CERVICAL SPINE: Primary | ICD-10-CM

## 2019-01-04 ENCOUNTER — HOSPITAL ENCOUNTER (OUTPATIENT)
Dept: MRI IMAGING | Facility: HOSPITAL | Age: 54
Discharge: HOME | End: 2019-01-04
Admitting: ORTHOPAEDIC SURGERY

## 2019-01-04 DIAGNOSIS — M54.12 CERVICAL RADICULOPATHY: ICD-10-CM

## 2019-01-04 DIAGNOSIS — M48.02 STENOSIS OF CERVICAL SPINE: ICD-10-CM

## 2019-01-04 PROCEDURE — 72141 MRI NECK SPINE W/O DYE: CPT

## 2019-01-08 RX ORDER — AMLODIPINE BESYLATE 5 MG/1
5 TABLET ORAL NIGHTLY
Qty: 90 TABLET | Refills: 0 | Status: SHIPPED | OUTPATIENT
Start: 2019-01-08 | End: 2019-02-11 | Stop reason: SDUPTHER

## 2019-01-08 RX ORDER — ALLOPURINOL 300 MG/1
300 TABLET ORAL DAILY
Qty: 90 TABLET | Refills: 0 | Status: SHIPPED | OUTPATIENT
Start: 2019-01-08 | End: 2019-02-11 | Stop reason: SDUPTHER

## 2019-02-11 RX ORDER — ALLOPURINOL 300 MG/1
300 TABLET ORAL DAILY
Qty: 90 TABLET | Refills: 1 | Status: SHIPPED | OUTPATIENT
Start: 2019-02-11 | End: 2019-09-04 | Stop reason: SDUPTHER

## 2019-02-11 RX ORDER — ATORVASTATIN CALCIUM 20 MG/1
20 TABLET, FILM COATED ORAL DAILY
Qty: 90 TABLET | Refills: 1 | Status: SHIPPED | OUTPATIENT
Start: 2019-02-11 | End: 2019-05-02 | Stop reason: SDUPTHER

## 2019-02-11 RX ORDER — TAMSULOSIN HYDROCHLORIDE 0.4 MG/1
1 CAPSULE ORAL DAILY
Qty: 90 CAPSULE | Refills: 1 | Status: SHIPPED | OUTPATIENT
Start: 2019-02-11 | End: 2019-05-02 | Stop reason: SDUPTHER

## 2019-02-11 RX ORDER — LISINOPRIL 10 MG/1
10 TABLET ORAL DAILY
Qty: 90 TABLET | Refills: 1 | Status: SHIPPED | OUTPATIENT
Start: 2019-02-11 | End: 2019-05-02 | Stop reason: SDUPTHER

## 2019-02-11 RX ORDER — AMLODIPINE BESYLATE 5 MG/1
5 TABLET ORAL NIGHTLY
Qty: 90 TABLET | Refills: 1 | Status: SHIPPED | OUTPATIENT
Start: 2019-02-11 | End: 2019-09-04 | Stop reason: SDUPTHER

## 2019-02-21 ENCOUNTER — TRANSCRIBE ORDERS (OUTPATIENT)
Dept: ADMINISTRATIVE | Facility: HOSPITAL | Age: 54
End: 2019-02-21

## 2019-02-21 DIAGNOSIS — R13.10 DYSPHAGIA, UNSPECIFIED TYPE: ICD-10-CM

## 2019-02-21 DIAGNOSIS — M48.02 CERVICAL STENOSIS OF SPINE: Primary | ICD-10-CM

## 2019-02-25 ENCOUNTER — TRANSCRIBE ORDERS (OUTPATIENT)
Dept: ADMINISTRATIVE | Facility: HOSPITAL | Age: 54
End: 2019-02-25

## 2019-02-25 DIAGNOSIS — M54.6 PAIN IN THORACIC SPINE: Primary | ICD-10-CM

## 2019-02-26 ENCOUNTER — APPOINTMENT (OUTPATIENT)
Dept: CT IMAGING | Facility: HOSPITAL | Age: 54
End: 2019-02-26

## 2019-02-27 ENCOUNTER — APPOINTMENT (OUTPATIENT)
Dept: CT IMAGING | Facility: HOSPITAL | Age: 54
End: 2019-02-27

## 2019-02-27 ENCOUNTER — HOSPITAL ENCOUNTER (OUTPATIENT)
Dept: CT IMAGING | Facility: HOSPITAL | Age: 54
Discharge: HOME | End: 2019-02-27
Admitting: ORTHOPAEDIC SURGERY

## 2019-02-27 DIAGNOSIS — M48.02 CERVICAL STENOSIS OF SPINE: ICD-10-CM

## 2019-02-27 DIAGNOSIS — M54.6 PAIN IN THORACIC SPINE: ICD-10-CM

## 2019-02-27 DIAGNOSIS — R13.10 DYSPHAGIA, UNSPECIFIED TYPE: ICD-10-CM

## 2019-02-27 PROCEDURE — 72128 CT CHEST SPINE W/O DYE: CPT

## 2019-02-27 PROCEDURE — 72125 CT NECK SPINE W/O DYE: CPT

## 2019-03-14 ENCOUNTER — TRANSCRIBE ORDERS (OUTPATIENT)
Dept: ADMINISTRATIVE | Facility: HOSPITAL | Age: 54
End: 2019-03-14

## 2019-03-14 DIAGNOSIS — M54.6 ACUTE THORACIC BACK PAIN, UNSPECIFIED BACK PAIN LATERALITY: Primary | ICD-10-CM

## 2019-03-20 ENCOUNTER — HOSPITAL ENCOUNTER (OUTPATIENT)
Dept: MRI IMAGING | Facility: HOSPITAL | Age: 54
Discharge: HOME | End: 2019-03-20
Admitting: ORTHOPAEDIC SURGERY

## 2019-03-20 DIAGNOSIS — M54.6 ACUTE THORACIC BACK PAIN, UNSPECIFIED BACK PAIN LATERALITY: ICD-10-CM

## 2019-03-20 PROCEDURE — 72146 MRI CHEST SPINE W/O DYE: CPT

## 2019-04-16 ENCOUNTER — OFFICE VISIT (OUTPATIENT)
Dept: INTERNAL MEDICINE | Facility: CLINIC | Age: 54
End: 2019-04-16

## 2019-04-16 VITALS
TEMPERATURE: 98.1 F | RESPIRATION RATE: 16 BRPM | WEIGHT: 241 LBS | OXYGEN SATURATION: 96 % | HEART RATE: 69 BPM | BODY MASS INDEX: 33.74 KG/M2 | HEIGHT: 71 IN | SYSTOLIC BLOOD PRESSURE: 142 MMHG | DIASTOLIC BLOOD PRESSURE: 82 MMHG

## 2019-04-16 DIAGNOSIS — E11.42 DIABETIC PERIPHERAL NEUROPATHY (HCC): ICD-10-CM

## 2019-04-16 DIAGNOSIS — R53.82 CHRONIC FATIGUE: ICD-10-CM

## 2019-04-16 DIAGNOSIS — I10 ESSENTIAL (PRIMARY) HYPERTENSION: ICD-10-CM

## 2019-04-16 DIAGNOSIS — M10.9 GOUT, UNSPECIFIED CAUSE, UNSPECIFIED CHRONICITY, UNSPECIFIED SITE: ICD-10-CM

## 2019-04-16 DIAGNOSIS — E11.42 TYPE 2 DIABETES MELLITUS WITH DIABETIC POLYNEUROPATHY, WITHOUT LONG-TERM CURRENT USE OF INSULIN (HCC): Primary | ICD-10-CM

## 2019-04-16 DIAGNOSIS — F51.01 PRIMARY INSOMNIA: ICD-10-CM

## 2019-04-16 DIAGNOSIS — R74.01 ELEVATED ALT MEASUREMENT: ICD-10-CM

## 2019-04-16 DIAGNOSIS — E78.00 PURE HYPERCHOLESTEROLEMIA: ICD-10-CM

## 2019-04-16 PROCEDURE — 99214 OFFICE O/P EST MOD 30 MIN: CPT | Performed by: INTERNAL MEDICINE

## 2019-04-16 RX ORDER — TIZANIDINE 4 MG/1
4 TABLET ORAL 2 TIMES DAILY PRN
Qty: 60 TABLET | Refills: 1 | Status: SHIPPED | OUTPATIENT
Start: 2019-04-16 | End: 2019-10-29 | Stop reason: SDUPTHER

## 2019-04-16 NOTE — PROGRESS NOTES
Flaco Miles . is a 53 y.o. male, who presents with a chief complaint of   Chief Complaint   Patient presents with   • Follow-up     6 x month f/u- patient fasting for labs.    • Hypertension       54 yo M here for follow up and doing well    He has chronic HTN and is doing well on current regimen. He is taking Lisinopril and Norvasc. No CP or SOB. He had spine surgery over the winter and is doing fair from this.     He has had chronic issues sleeping. Sleeps with the TV on. Sleep medications have not worked including prescriptions, Melatonin included.     He has chronic type 2 diabetes and is doing well on Metformin. His BG's are running 300-500 in the AM. Taking 500mg PO BID. He is not eating well, eating lots of sweets.     He has chronic pain in his back and knees and feet that are managed by his pain specialist. Does well with this. I prescribe his Gabapentin for his neuroapthy and his muscle relaxers. They also help his pain.              The following portions of the patient's history were reviewed and updated as appropriate: allergies, current medications, past family history, past medical history, past social history, past surgical history and problem list.    Allergies: Fexofenadine-pseudoephed er    Current Outpatient Medications:   •  allopurinol (ZYLOPRIM) 300 MG tablet, Take 1 tablet by mouth Daily., Disp: 90 tablet, Rfl: 1  •  amLODIPine (NORVASC) 5 MG tablet, Take 1 tablet by mouth Every Night., Disp: 90 tablet, Rfl: 1  •  atorvastatin (LIPITOR) 20 MG tablet, TAKE 1 TABLET BY MOUTH  DAILY, Disp: 90 tablet, Rfl: 1  •  gabapentin (NEURONTIN) 600 MG tablet, Take 1.5 tablets by mouth 2 (Two) Times a Day., Disp: 270 tablet, Rfl: 1  •  lisinopril (PRINIVIL,ZESTRIL) 10 MG tablet, TAKE 1 TABLET BY MOUTH  DAILY, Disp: 90 tablet, Rfl: 1  •  metFORMIN (GLUCOPHAGE) 500 MG tablet, TAKE 1 TABLET BY MOUTH TWO  TIMES DAILY WITH MEALS, Disp: 180 tablet, Rfl: 1  •  oxyCODONE-acetaminophen (PERCOCET)   "MG per tablet, Take 0.5-1 tablets by mouth Every 8 (Eight) Hours As Needed for Severe Pain ., Disp: 90 tablet, Rfl: 0  •  tamsulosin (FLOMAX) 0.4 MG capsule 24 hr capsule, TAKE 1 CAPSULE BY MOUTH  DAILY, Disp: 90 capsule, Rfl: 1  •  tiZANidine (ZANAFLEX) 4 MG tablet, Take 1 tablet by mouth 2 (Two) Times a Day As Needed for Muscle Spasms., Disp: 60 tablet, Rfl: 1  Medications Discontinued During This Encounter   Medication Reason   • clobetasol (TEMOVATE) 0.05 % cream *Therapy completed   • tiZANidine (ZANAFLEX) 4 MG tablet Reorder       Review of Systems   Constitutional: Positive for fatigue and unexpected weight change. Negative for chills and fever.   Respiratory: Negative for cough and shortness of breath.    Gastrointestinal: Negative for abdominal pain, constipation, diarrhea and nausea.   Musculoskeletal: Positive for arthralgias and back pain. Negative for gait problem.   Neurological: Negative for dizziness and headaches.             /82 (BP Location: Left arm, Patient Position: Sitting, Cuff Size: Large Adult)   Pulse 69   Temp 98.1 °F (36.7 °C) (Oral)   Resp 16   Ht 180.3 cm (71\")   Wt 109 kg (241 lb)   SpO2 96%   BMI 33.61 kg/m²       Physical Exam   Constitutional: He is oriented to person, place, and time. He appears well-developed and well-nourished. No distress.   HENT:   Head: Normocephalic and atraumatic.   Right Ear: External ear normal.   Left Ear: External ear normal.   Mouth/Throat: Oropharynx is clear and moist. No oropharyngeal exudate.   Eyes: Conjunctivae are normal. Right eye exhibits no discharge. Left eye exhibits no discharge. No scleral icterus.   Neck: Neck supple.   Cardiovascular: Normal rate, regular rhythm and normal heart sounds. Exam reveals no gallop and no friction rub.   No murmur heard.  Pulmonary/Chest: Effort normal and breath sounds normal. No respiratory distress. He has no wheezes. He has no rales.   Lymphadenopathy:     He has no cervical adenopathy. "   Neurological: He is alert and oriented to person, place, and time. Coordination (walking with cane ) abnormal.   Skin: Skin is warm. No rash noted.   Psychiatric: He has a normal mood and affect. His behavior is normal.   Nursing note and vitals reviewed.        Results for orders placed or performed in visit on 08/17/18   Urine culture, Comprehensive   Result Value Ref Range    Urine Culture Final report (A)     Result 1 Enterococcus faecalis (A)     Susceptibility Testing Comment    Comprehensive Metabolic Panel   Result Value Ref Range    Glucose 129 (H) 65 - 99 mg/dL    BUN 17 6 - 20 mg/dL    Creatinine 0.82 0.76 - 1.27 mg/dL    eGFR Non African Am 99 >60 mL/min/1.73    eGFR African Am 120 >60 mL/min/1.73    BUN/Creatinine Ratio 20.7 7.0 - 25.0    Sodium 138 136 - 145 mmol/L    Potassium 4.2 3.5 - 5.2 mmol/L    Chloride 99 98 - 107 mmol/L    Total CO2 24.8 22.0 - 29.0 mmol/L    Calcium 9.3 8.6 - 10.5 mg/dL    Total Protein 7.4 6.0 - 8.5 g/dL    Albumin 5.00 3.50 - 5.20 g/dL    Globulin 2.4 gm/dL    A/G Ratio 2.1 g/dL    Total Bilirubin 1.3 (H) 0.2 - 1.2 mg/dL    Alkaline Phosphatase 78 40 - 129 U/L    AST (SGOT) 32 5 - 40 U/L    ALT (SGPT) 48 (H) 5 - 41 U/L   Hemoglobin A1c   Result Value Ref Range    Hemoglobin A1C 7.10 (H) 4.80 - 5.60 %   Microalbumin / Creatinine Urine Ratio - Urine, Clean Catch   Result Value Ref Range    Creatinine, Urine 223.9 Not Estab. mg/dL    Microalbumin, Urine 124.8 Not Estab. ug/mL    Microalbumin/Creatinine Ratio 55.7 (H) 0.0 - 30.0 mg/g creat   Urinalysis With Culture If Indicated - Urine, Clean Catch   Result Value Ref Range    Specific Gravity, UA      >=1.030 (A) 1.005 - 1.030    pH, UA 5.5 5.0 - 7.5    Color, UA Yellow Yellow    Appearance, UA Clear Clear    Leukocytes, UA Trace (A) Negative    Protein 1+ (A) Negative/Trace    Glucose, UA Negative Negative    Ketones Trace (A) Negative    Blood, UA Negative Negative    Bilirubin, UA Negative Negative    Urobilinogen, UA 0.2  0.2 - 1.0 mg/dL    Nitrite, UA Negative Negative    Microscopic Examination See below:     Urinalysis Reflex Comment    TSH Rfx On Abnormal To Free T4   Result Value Ref Range    TSH 3.50 0.27 - 4.2 mIU/mL   Lipid Panel With LDL / HDL Ratio   Result Value Ref Range    Total Cholesterol 144 0 - 200 mg/dL    Triglycerides 167 (H) 0 - 150 mg/dL    HDL Cholesterol 41 40 - 60 mg/dL    VLDL Cholesterol 33.4 (H) 8 - 32 mg/dL    LDL Cholesterol  70 0 - 100 mg/dL    LDL/HDL Ratio 1.70    Uric Acid   Result Value Ref Range    Uric Acid 4.0 3.4 - 7.0 mg/dL   PSA DIAGNOSTIC   Result Value Ref Range    PSA 3.140 0.000 - 4.000 ng/mL   Microscopic Examination   Result Value Ref Range    WBC, UA 6-10 (A) 0 - 5 /hpf    RBC, UA 0-2 0 - 2 /hpf    Epithelial Cells (non renal) 0-10 0 - 10 /hpf    Crystals, UA Present (A) N/A    Crystal Type Calcium Oxalate N/A    Mucus, UA Present Not Estab. /hpf    Bacteria, UA Few None seen/Few /hpf   CBC & Differential   Result Value Ref Range    WBC 10.20 4.80 - 10.80 10*3/mm3    RBC 5.10 4.70 - 6.10 10*6/mm3    Hemoglobin 15.3 14.0 - 18.0 g/dL    Hematocrit 45.3 42.0 - 52.0 %    MCV 88.8 80.0 - 94.0 fL    MCH 30.0 27.0 - 31.0 pg    MCHC 33.8 31.0 - 37.0 g/dL    RDW 14.1 11.5 - 14.5 %    Platelets 242 140 - 500 10*3/mm3    Neutrophil Rel % 64.6 45.0 - 70.0 %    Lymphocyte Rel % 25.4 20.0 - 45.0 %    Monocyte Rel % 6.8 3.0 - 8.0 %    Eosinophil Rel % 2.6 0.0 - 4.0 %    Basophil Rel % 0.2 0.0 - 2.0 %    Neutrophils Absolute 6.59 1.50 - 8.30 10*3/mm3    Lymphocytes Absolute 2.59 0.60 - 4.80 10*3/mm3    Monocytes Absolute 0.69 0.00 - 1.00 10*3/mm3    Eosinophils Absolute 0.27 0.10 - 0.30 10*3/mm3    Basophils Absolute 0.02 0.00 - 0.20 10*3/mm3    Immature Granulocyte Rel % 0.4 0.0 - 0.5 %    Immature Grans Absolute 0.04 (H) 0.00 - 0.03 10*3/mm3    nRBC 0.0 0.0 - 0.0 /100 WBC           Flaco was seen today for follow-up and hypertension.    Diagnoses and all orders for this visit:    Type 2 diabetes  mellitus with diabetic polyneuropathy, without long-term current use of insulin (CMS/HCC)  -     CBC & Differential  -     Comprehensive Metabolic Panel  -     Hemoglobin A1c  -     Microalbumin / Creatinine Urine Ratio - Urine, Clean Catch  -     Urinalysis With Culture If Indicated - Urine, Clean Catch  -     Vitamin B12    Diabetic peripheral neuropathy (CMS/HCC)  -     Vitamin B12    Essential (primary) hypertension  -     CBC & Differential  -     Comprehensive Metabolic Panel    Pure hypercholesterolemia  -     CBC & Differential  -     Comprehensive Metabolic Panel  -     Lipid Panel With LDL / HDL Ratio    Elevated ALT measurement  -     Comprehensive Metabolic Panel    Chronic fatigue    Primary insomnia    Gout, unspecified cause, unspecified chronicity, unspecified site  -     Uric Acid    Other orders  -     tiZANidine (ZANAFLEX) 4 MG tablet; Take 1 tablet by mouth 2 (Two) Times a Day As Needed for Muscle Spasms.        I have a feeling that his diabetes is not going to under good control. He did not have his labs prior to this visit and needs to schedule them. Will decide then on increasing his Metformin. Discussed with him today about eating better.     Will continue his Gabapentin for his neuroapthy.     Needs to check his cholesterol and will call with results.Continue Lipitor at current dose. Follow up in 3 months.     Gout is stable. Continue Allopurinol.     HTN is under good control and patient will continue to monitor with home BP cuff. No side effects from medications. Will continue current regimen of Lisinopril and Norvasc. Will follow up in 6 months        Return in about 3 months (around 7/16/2019) for Recheck.    Elenita Wray MD  04/16/2019

## 2019-04-25 LAB
ALBUMIN SERPL-MCNC: 4.7 G/DL (ref 3.5–5.2)
ALBUMIN/CREAT UR: 26 MG/G CREAT (ref 0–30)
ALBUMIN/GLOB SERPL: 1.8 G/DL
ALP SERPL-CCNC: 86 U/L (ref 39–117)
ALT SERPL-CCNC: 42 U/L (ref 1–41)
APPEARANCE UR: CLEAR
AST SERPL-CCNC: 32 U/L (ref 1–40)
BACTERIA #/AREA URNS HPF: ABNORMAL /HPF
BACTERIA UR CULT: NORMAL
BACTERIA UR CULT: NORMAL
BASOPHILS # BLD AUTO: 0.03 10*3/MM3 (ref 0–0.2)
BASOPHILS NFR BLD AUTO: 0.3 % (ref 0–1.5)
BILIRUB SERPL-MCNC: 0.9 MG/DL (ref 0.2–1.2)
BILIRUB UR QL STRIP: NEGATIVE
BUN SERPL-MCNC: 16 MG/DL (ref 6–20)
BUN/CREAT SERPL: 20.5 (ref 7–25)
CALCIUM SERPL-MCNC: 9.9 MG/DL (ref 8.6–10.5)
CHLORIDE SERPL-SCNC: 102 MMOL/L (ref 98–107)
CHOLEST SERPL-MCNC: 155 MG/DL (ref 0–200)
CO2 SERPL-SCNC: 25 MMOL/L (ref 22–29)
COLOR UR: YELLOW
CREAT SERPL-MCNC: 0.78 MG/DL (ref 0.76–1.27)
CREAT UR-MCNC: 171.3 MG/DL
CRYSTALS URNS MICRO: ABNORMAL
EOSINOPHIL # BLD AUTO: 0.47 10*3/MM3 (ref 0–0.4)
EOSINOPHIL NFR BLD AUTO: 5.3 % (ref 0.3–6.2)
EPI CELLS #/AREA URNS HPF: ABNORMAL /HPF
ERYTHROCYTE [DISTWIDTH] IN BLOOD BY AUTOMATED COUNT: 13.8 % (ref 12.3–15.4)
GLOBULIN SER CALC-MCNC: 2.6 GM/DL
GLUCOSE SERPL-MCNC: 180 MG/DL (ref 65–99)
GLUCOSE UR QL: NEGATIVE
HBA1C MFR BLD: 8.4 % (ref 4.8–5.6)
HCT VFR BLD AUTO: 47.1 % (ref 37.5–51)
HDLC SERPL-MCNC: 35 MG/DL (ref 40–60)
HGB BLD-MCNC: 15.2 G/DL (ref 13–17.7)
HGB UR QL STRIP: NEGATIVE
IMM GRANULOCYTES # BLD AUTO: 0.03 10*3/MM3 (ref 0–0.05)
IMM GRANULOCYTES NFR BLD AUTO: 0.3 % (ref 0–0.5)
KETONES UR QL STRIP: NEGATIVE
LDLC SERPL CALC-MCNC: 83 MG/DL (ref 0–100)
LDLC/HDLC SERPL: 2.37 {RATIO}
LEUKOCYTE ESTERASE UR QL STRIP: ABNORMAL
LYMPHOCYTES # BLD AUTO: 2.31 10*3/MM3 (ref 0.7–3.1)
LYMPHOCYTES NFR BLD AUTO: 26.1 % (ref 19.6–45.3)
MCH RBC QN AUTO: 28.8 PG (ref 26.6–33)
MCHC RBC AUTO-ENTMCNC: 32.3 G/DL (ref 31.5–35.7)
MCV RBC AUTO: 89.4 FL (ref 79–97)
MICRO URNS: ABNORMAL
MICROALBUMIN UR-MCNC: 44.5 UG/ML
MONOCYTES # BLD AUTO: 0.66 10*3/MM3 (ref 0.1–0.9)
MONOCYTES NFR BLD AUTO: 7.5 % (ref 5–12)
MUCOUS THREADS URNS QL MICRO: PRESENT /HPF
NEUTROPHILS # BLD AUTO: 5.34 10*3/MM3 (ref 1.7–7)
NEUTROPHILS NFR BLD AUTO: 60.5 % (ref 42.7–76)
NITRITE UR QL STRIP: NEGATIVE
NRBC BLD AUTO-RTO: 0 /100 WBC (ref 0–0.2)
PH UR STRIP: 5 [PH] (ref 5–7.5)
PLATELET # BLD AUTO: 248 10*3/MM3 (ref 140–450)
POTASSIUM SERPL-SCNC: 4.4 MMOL/L (ref 3.5–5.2)
PROT SERPL-MCNC: 7.3 G/DL (ref 6–8.5)
PROT UR QL STRIP: NEGATIVE
RBC # BLD AUTO: 5.27 10*6/MM3 (ref 4.14–5.8)
RBC #/AREA URNS HPF: ABNORMAL /HPF
SODIUM SERPL-SCNC: 138 MMOL/L (ref 136–145)
SP GR UR: 1.03 (ref 1–1.03)
TRIGL SERPL-MCNC: 185 MG/DL (ref 0–150)
UNIDENT CRYS URNS QL MICRO: PRESENT
URATE SERPL-MCNC: 3.9 MG/DL (ref 3.4–7)
URINALYSIS REFLEX: ABNORMAL
UROBILINOGEN UR STRIP-MCNC: 0.2 MG/DL (ref 0.2–1)
VIT B12 SERPL-MCNC: 869 PG/ML (ref 211–946)
VLDLC SERPL CALC-MCNC: 37 MG/DL
WBC # BLD AUTO: 8.84 10*3/MM3 (ref 3.4–10.8)
WBC #/AREA URNS HPF: ABNORMAL /HPF

## 2019-04-26 NOTE — PROGRESS NOTES
Diabetes number is higher which is what we expected. Please have him go up to 1000mg PO BID of his Metformin (he is on 500mg PO BID now I think).

## 2019-04-29 ENCOUNTER — TELEPHONE (OUTPATIENT)
Dept: INTERNAL MEDICINE | Facility: CLINIC | Age: 54
End: 2019-04-29

## 2019-04-29 NOTE — TELEPHONE ENCOUNTER
LVM with results on personal cell phone. Advised to return call if needed.     ----- Message from Elenita Wray MD sent at 4/26/2019  8:11 AM EDT -----  Diabetes number is higher which is what we expected. Please have him go up to 1000mg PO BID of his Metformin (he is on 500mg PO BID now I think).

## 2019-05-02 ENCOUNTER — TELEPHONE (OUTPATIENT)
Dept: INTERNAL MEDICINE | Facility: CLINIC | Age: 54
End: 2019-05-02

## 2019-05-02 RX ORDER — LISINOPRIL 10 MG/1
10 TABLET ORAL DAILY
Qty: 90 TABLET | Refills: 1 | Status: SHIPPED | OUTPATIENT
Start: 2019-05-02 | End: 2019-09-19 | Stop reason: SDUPTHER

## 2019-05-02 RX ORDER — ATORVASTATIN CALCIUM 20 MG/1
20 TABLET, FILM COATED ORAL DAILY
Qty: 90 TABLET | Refills: 1 | Status: SHIPPED | OUTPATIENT
Start: 2019-05-02 | End: 2019-09-19 | Stop reason: SDUPTHER

## 2019-05-02 RX ORDER — TAMSULOSIN HYDROCHLORIDE 0.4 MG/1
1 CAPSULE ORAL DAILY
Qty: 90 CAPSULE | Refills: 1 | Status: SHIPPED | OUTPATIENT
Start: 2019-05-02 | End: 2019-09-19 | Stop reason: SDUPTHER

## 2019-05-02 NOTE — TELEPHONE ENCOUNTER
Sent to pharmacy          ----- Message from Vidya Cooper sent at 5/1/2019  3:27 PM EDT -----  Regarding: SCRIPT REQUEST  AHMET    Patient calling stating he needs to have following scripts sent to [facility].  Patient says he can't call the pharmacy because it's on-line?    tamsulosin (FLOMAX) 0.4 MG capsule 24 hr capsule     atorvastatin (LIPITOR) 20 MG tablet     lisinopril (PRINIVIL,ZESTRIL) 10 MG tablet     metFORMIN (GLUCOPHAGE) 500 MG tablet  (pt says dosage was increased to 1000 mg tablet - one in am and one in pm)

## 2019-07-05 RX ORDER — GABAPENTIN 600 MG/1
TABLET ORAL
Qty: 270 TABLET | Refills: 0 | Status: SHIPPED | OUTPATIENT
Start: 2019-07-05 | End: 2019-07-16

## 2019-07-08 DIAGNOSIS — E78.00 PURE HYPERCHOLESTEROLEMIA: ICD-10-CM

## 2019-07-08 DIAGNOSIS — E11.42 TYPE 2 DIABETES MELLITUS WITH DIABETIC POLYNEUROPATHY, WITHOUT LONG-TERM CURRENT USE OF INSULIN (HCC): Primary | ICD-10-CM

## 2019-07-09 ENCOUNTER — LAB (OUTPATIENT)
Dept: INTERNAL MEDICINE | Facility: CLINIC | Age: 54
End: 2019-07-09

## 2019-07-09 DIAGNOSIS — E78.00 PURE HYPERCHOLESTEROLEMIA: ICD-10-CM

## 2019-07-09 DIAGNOSIS — E11.42 TYPE 2 DIABETES MELLITUS WITH DIABETIC POLYNEUROPATHY, WITHOUT LONG-TERM CURRENT USE OF INSULIN (HCC): ICD-10-CM

## 2019-07-10 LAB
ALBUMIN SERPL-MCNC: 4.6 G/DL (ref 3.5–5.2)
ALBUMIN/GLOB SERPL: 1.8 G/DL
ALP SERPL-CCNC: 82 U/L (ref 39–117)
ALT SERPL-CCNC: 34 U/L (ref 1–41)
AST SERPL-CCNC: 34 U/L (ref 1–40)
BASOPHILS # BLD AUTO: 0.03 10*3/MM3 (ref 0–0.2)
BASOPHILS NFR BLD AUTO: 0.3 % (ref 0–1.5)
BILIRUB SERPL-MCNC: 1.3 MG/DL (ref 0.2–1.2)
BUN SERPL-MCNC: 20 MG/DL (ref 6–20)
BUN/CREAT SERPL: 26.7 (ref 7–25)
CALCIUM SERPL-MCNC: 10.3 MG/DL (ref 8.6–10.5)
CHLORIDE SERPL-SCNC: 101 MMOL/L (ref 98–107)
CHOLEST SERPL-MCNC: 134 MG/DL (ref 0–200)
CO2 SERPL-SCNC: 27.6 MMOL/L (ref 22–29)
CREAT SERPL-MCNC: 0.75 MG/DL (ref 0.76–1.27)
EOSINOPHIL # BLD AUTO: 0.46 10*3/MM3 (ref 0–0.4)
EOSINOPHIL NFR BLD AUTO: 4.3 % (ref 0.3–6.2)
ERYTHROCYTE [DISTWIDTH] IN BLOOD BY AUTOMATED COUNT: 14.8 % (ref 12.3–15.4)
GLOBULIN SER CALC-MCNC: 2.5 GM/DL
GLUCOSE SERPL-MCNC: 139 MG/DL (ref 65–99)
HBA1C MFR BLD: 7.7 % (ref 4.8–5.6)
HCT VFR BLD AUTO: 46.6 % (ref 37.5–51)
HDLC SERPL-MCNC: 37 MG/DL (ref 40–60)
HGB BLD-MCNC: 15.1 G/DL (ref 13–17.7)
IMM GRANULOCYTES # BLD AUTO: 0.03 10*3/MM3 (ref 0–0.05)
IMM GRANULOCYTES NFR BLD AUTO: 0.3 % (ref 0–0.5)
LDLC SERPL CALC-MCNC: 64 MG/DL (ref 0–100)
LDLC/HDLC SERPL: 1.73 {RATIO}
LYMPHOCYTES # BLD AUTO: 2.47 10*3/MM3 (ref 0.7–3.1)
LYMPHOCYTES NFR BLD AUTO: 23 % (ref 19.6–45.3)
MCH RBC QN AUTO: 29.7 PG (ref 26.6–33)
MCHC RBC AUTO-ENTMCNC: 32.4 G/DL (ref 31.5–35.7)
MCV RBC AUTO: 91.6 FL (ref 79–97)
MONOCYTES # BLD AUTO: 0.71 10*3/MM3 (ref 0.1–0.9)
MONOCYTES NFR BLD AUTO: 6.6 % (ref 5–12)
NEUTROPHILS # BLD AUTO: 7.02 10*3/MM3 (ref 1.7–7)
NEUTROPHILS NFR BLD AUTO: 65.5 % (ref 42.7–76)
NRBC BLD AUTO-RTO: 0 /100 WBC (ref 0–0.2)
PLATELET # BLD AUTO: 234 10*3/MM3 (ref 140–450)
POTASSIUM SERPL-SCNC: 4.5 MMOL/L (ref 3.5–5.2)
PROT SERPL-MCNC: 7.1 G/DL (ref 6–8.5)
RBC # BLD AUTO: 5.09 10*6/MM3 (ref 4.14–5.8)
SODIUM SERPL-SCNC: 142 MMOL/L (ref 136–145)
TRIGL SERPL-MCNC: 165 MG/DL (ref 0–150)
VLDLC SERPL CALC-MCNC: 33 MG/DL
WBC # BLD AUTO: 10.72 10*3/MM3 (ref 3.4–10.8)

## 2019-07-16 ENCOUNTER — OFFICE VISIT (OUTPATIENT)
Dept: INTERNAL MEDICINE | Facility: CLINIC | Age: 54
End: 2019-07-16

## 2019-07-16 VITALS
WEIGHT: 239 LBS | OXYGEN SATURATION: 99 % | HEART RATE: 58 BPM | SYSTOLIC BLOOD PRESSURE: 130 MMHG | RESPIRATION RATE: 16 BRPM | DIASTOLIC BLOOD PRESSURE: 72 MMHG | HEIGHT: 71 IN | TEMPERATURE: 97.9 F | BODY MASS INDEX: 33.46 KG/M2

## 2019-07-16 DIAGNOSIS — I10 ESSENTIAL (PRIMARY) HYPERTENSION: ICD-10-CM

## 2019-07-16 DIAGNOSIS — E11.42 DIABETIC PERIPHERAL NEUROPATHY (HCC): ICD-10-CM

## 2019-07-16 DIAGNOSIS — E11.42 TYPE 2 DIABETES MELLITUS WITH DIABETIC POLYNEUROPATHY, WITHOUT LONG-TERM CURRENT USE OF INSULIN (HCC): Primary | ICD-10-CM

## 2019-07-16 DIAGNOSIS — F51.01 PRIMARY INSOMNIA: ICD-10-CM

## 2019-07-16 PROCEDURE — 99214 OFFICE O/P EST MOD 30 MIN: CPT | Performed by: INTERNAL MEDICINE

## 2019-07-16 RX ORDER — PREGABALIN 50 MG/1
50 CAPSULE ORAL 2 TIMES DAILY
Qty: 60 CAPSULE | Refills: 0 | Status: SHIPPED | OUTPATIENT
Start: 2019-07-16 | End: 2019-10-29 | Stop reason: SDUPTHER

## 2019-07-16 NOTE — PROGRESS NOTES
Flaco Miles  is a 53 y.o. male, who presents with a chief complaint of   Chief Complaint   Patient presents with   • Follow-up     3 x month f/u, lab results   • Diabetes   • Hypertension       52 yo M here for follow up of his diabetes. He is in pain on most day and has tingling in his legs bilaterally from neuropathy and his low back issues. He sleeps about 45 minutes to 2 hours at night and cat naps during the day. He has tried sleep aids that have not helped. He is prescribed Gabapentin by me as well as Oxycodone by pain doctor. Misses after dose of Gabapentin and feels that it no longer helps.     He has Type 2 diabetes and it is better. He just increased Metformin 1000mg PO BID 3 months ago. Feels well on the increase. He has lost about 15 lbs as well since I saw him last.              The following portions of the patient's history were reviewed and updated as appropriate: allergies, current medications, past family history, past medical history, past social history, past surgical history and problem list.    Allergies: Fexofenadine-pseudoephed er    Current Outpatient Medications:   •  allopurinol (ZYLOPRIM) 300 MG tablet, Take 1 tablet by mouth Daily., Disp: 90 tablet, Rfl: 1  •  amLODIPine (NORVASC) 5 MG tablet, Take 1 tablet by mouth Every Night., Disp: 90 tablet, Rfl: 1  •  atorvastatin (LIPITOR) 20 MG tablet, Take 1 tablet by mouth Daily., Disp: 90 tablet, Rfl: 1  •  lisinopril (PRINIVIL,ZESTRIL) 10 MG tablet, Take 1 tablet by mouth Daily., Disp: 90 tablet, Rfl: 1  •  metFORMIN (GLUCOPHAGE) 1000 MG tablet, Take 1 tablet by mouth 2 (Two) Times a Day With Meals., Disp: 180 tablet, Rfl: 1  •  oxyCODONE-acetaminophen (PERCOCET)  MG per tablet, Take 0.5-1 tablets by mouth Every 8 (Eight) Hours As Needed for Severe Pain ., Disp: 90 tablet, Rfl: 0  •  tamsulosin (FLOMAX) 0.4 MG capsule 24 hr capsule, Take 1 capsule by mouth Daily., Disp: 90 capsule, Rfl: 1  •  tiZANidine (ZANAFLEX) 4 MG  "tablet, Take 1 tablet by mouth 2 (Two) Times a Day As Needed for Muscle Spasms., Disp: 60 tablet, Rfl: 1  •  pregabalin (LYRICA) 50 MG capsule, Take 1 capsule by mouth 2 (Two) Times a Day., Disp: 60 capsule, Rfl: 0  Medications Discontinued During This Encounter   Medication Reason   • gabapentin (NEURONTIN) 600 MG tablet Duplicate order   • gabapentin (NEURONTIN) 600 MG tablet *Therapy completed       Review of Systems   Constitutional: Positive for fatigue. Negative for chills and fever.   Gastrointestinal: Negative for constipation, diarrhea, nausea and vomiting.   Musculoskeletal: Positive for arthralgias and back pain.   Neurological: Negative for dizziness and headaches.   Psychiatric/Behavioral: Positive for sleep disturbance.             /72 (BP Location: Left arm, Patient Position: Sitting, Cuff Size: Large Adult)   Pulse 58   Temp 97.9 °F (36.6 °C) (Oral)   Resp 16   Ht 180.3 cm (71\")   Wt 108 kg (239 lb)   SpO2 99%   BMI 33.33 kg/m²       Physical Exam   Constitutional: He is oriented to person, place, and time. He appears well-developed and well-nourished. No distress.   HENT:   Head: Normocephalic and atraumatic.   Right Ear: External ear normal.   Left Ear: External ear normal.   Mouth/Throat: Oropharynx is clear and moist. No oropharyngeal exudate.   Eyes: Conjunctivae are normal. Right eye exhibits no discharge. Left eye exhibits no discharge. No scleral icterus.   Neck: Neck supple.   Cardiovascular: Normal rate, regular rhythm and normal heart sounds. Exam reveals no gallop and no friction rub.   No murmur heard.  Pulmonary/Chest: Effort normal and breath sounds normal. No respiratory distress. He has no wheezes. He has no rales.   Lymphadenopathy:     He has no cervical adenopathy.   Neurological: He is alert and oriented to person, place, and time. He exhibits normal muscle tone. Coordination (walking with cane) abnormal.   Skin: Skin is warm. No rash noted.   Psychiatric: He has a " normal mood and affect. His behavior is normal.   Nursing note and vitals reviewed.        Results for orders placed or performed in visit on 07/09/19   Hemoglobin A1c   Result Value Ref Range    Hemoglobin A1C 7.70 (H) 4.80 - 5.60 %   Lipid Panel With LDL / HDL Ratio   Result Value Ref Range    Total Cholesterol 134 0 - 200 mg/dL    Triglycerides 165 (H) 0 - 150 mg/dL    HDL Cholesterol 37 (L) 40 - 60 mg/dL    VLDL Cholesterol 33 mg/dL    LDL Cholesterol  64 0 - 100 mg/dL    LDL/HDL Ratio 1.73    Comprehensive metabolic panel   Result Value Ref Range    Glucose 139 (H) 65 - 99 mg/dL    BUN 20 6 - 20 mg/dL    Creatinine 0.75 (L) 0.76 - 1.27 mg/dL    eGFR Non African Am 109 >60 mL/min/1.73    eGFR African Am 133 >60 mL/min/1.73    BUN/Creatinine Ratio 26.7 (H) 7.0 - 25.0    Sodium 142 136 - 145 mmol/L    Potassium 4.5 3.5 - 5.2 mmol/L    Chloride 101 98 - 107 mmol/L    Total CO2 27.6 22.0 - 29.0 mmol/L    Calcium 10.3 8.6 - 10.5 mg/dL    Total Protein 7.1 6.0 - 8.5 g/dL    Albumin 4.60 3.50 - 5.20 g/dL    Globulin 2.5 gm/dL    A/G Ratio 1.8 g/dL    Total Bilirubin 1.3 (H) 0.2 - 1.2 mg/dL    Alkaline Phosphatase 82 39 - 117 U/L    AST (SGOT) 34 1 - 40 U/L    ALT (SGPT) 34 1 - 41 U/L   CBC & Differential   Result Value Ref Range    WBC 10.72 3.40 - 10.80 10*3/mm3    RBC 5.09 4.14 - 5.80 10*6/mm3    Hemoglobin 15.1 13.0 - 17.7 g/dL    Hematocrit 46.6 37.5 - 51.0 %    MCV 91.6 79.0 - 97.0 fL    MCH 29.7 26.6 - 33.0 pg    MCHC 32.4 31.5 - 35.7 g/dL    RDW 14.8 12.3 - 15.4 %    Platelets 234 140 - 450 10*3/mm3    Neutrophil Rel % 65.5 42.7 - 76.0 %    Lymphocyte Rel % 23.0 19.6 - 45.3 %    Monocyte Rel % 6.6 5.0 - 12.0 %    Eosinophil Rel % 4.3 0.3 - 6.2 %    Basophil Rel % 0.3 0.0 - 1.5 %    Neutrophils Absolute 7.02 (H) 1.70 - 7.00 10*3/mm3    Lymphocytes Absolute 2.47 0.70 - 3.10 10*3/mm3    Monocytes Absolute 0.71 0.10 - 0.90 10*3/mm3    Eosinophils Absolute 0.46 (H) 0.00 - 0.40 10*3/mm3    Basophils Absolute 0.03 0.00  - 0.20 10*3/mm3    Immature Granulocyte Rel % 0.3 0.0 - 0.5 %    Immature Grans Absolute 0.03 0.00 - 0.05 10*3/mm3    nRBC 0.0 0.0 - 0.2 /100 WBC           Flaco was seen today for follow-up, diabetes and hypertension.    Diagnoses and all orders for this visit:    Type 2 diabetes mellitus with diabetic polyneuropathy, without long-term current use of insulin (CMS/HCC)    Primary insomnia    Essential (primary) hypertension    Diabetic peripheral neuropathy (CMS/HCC)    Other orders  -     pregabalin (LYRICA) 50 MG capsule; Take 1 capsule by mouth 2 (Two) Times a Day.      We are going to stop Gabapentin and will start Lyrica 50mg BID. Will increase gradually and he will call me and let me know if this is helping with sleep and his pain.     Will continue Metformin since his HgA1c has gone down. Goal is 7% of more. Will recheck in 3 months and if still above 7%, will add Januvia next time.     HTN is under good control and patient will continue to monitor with home BP cuff. No side effects from medications. Will continue current regimen of Lisinopril and Norvasc. Michael follow up in 3 months          Return in about 3 months (around 10/16/2019) for Recheck.    Elenita Wray MD  07/16/2019

## 2019-07-25 RX ORDER — DULOXETIN HYDROCHLORIDE 30 MG/1
30 CAPSULE, DELAYED RELEASE ORAL DAILY
Qty: 30 CAPSULE | Refills: 0 | Status: SHIPPED | OUTPATIENT
Start: 2019-07-25 | End: 2021-08-26 | Stop reason: SDUPTHER

## 2019-08-30 RX ORDER — GABAPENTIN 600 MG/1
TABLET ORAL
Qty: 270 TABLET | OUTPATIENT
Start: 2019-08-30

## 2019-09-04 ENCOUNTER — TRANSCRIBE ORDERS (OUTPATIENT)
Dept: ADMINISTRATIVE | Facility: HOSPITAL | Age: 54
End: 2019-09-04

## 2019-09-04 DIAGNOSIS — M54.6 THORACIC BACK PAIN, UNSPECIFIED BACK PAIN LATERALITY, UNSPECIFIED CHRONICITY: Primary | ICD-10-CM

## 2019-09-04 DIAGNOSIS — M51.34 THORACIC DEGENERATIVE DISC DISEASE: ICD-10-CM

## 2019-09-05 RX ORDER — AMLODIPINE BESYLATE 5 MG/1
5 TABLET ORAL NIGHTLY
Qty: 90 TABLET | Refills: 1 | Status: SHIPPED | OUTPATIENT
Start: 2019-09-05 | End: 2020-07-30 | Stop reason: SDUPTHER

## 2019-09-05 RX ORDER — ALLOPURINOL 300 MG/1
300 TABLET ORAL DAILY
Qty: 90 TABLET | Refills: 1 | Status: SHIPPED | OUTPATIENT
Start: 2019-09-05 | End: 2020-11-02 | Stop reason: SDUPTHER

## 2019-09-12 ENCOUNTER — HOSPITAL ENCOUNTER (OUTPATIENT)
Dept: MRI IMAGING | Facility: HOSPITAL | Age: 54
Discharge: HOME | End: 2019-09-12
Admitting: ORTHOPAEDIC SURGERY

## 2019-09-12 DIAGNOSIS — M54.6 THORACIC BACK PAIN, UNSPECIFIED BACK PAIN LATERALITY, UNSPECIFIED CHRONICITY: ICD-10-CM

## 2019-09-12 DIAGNOSIS — M51.34 THORACIC DEGENERATIVE DISC DISEASE: ICD-10-CM

## 2019-09-12 PROCEDURE — 72146 MRI CHEST SPINE W/O DYE: CPT

## 2019-09-16 RX ORDER — GABAPENTIN 600 MG/1
TABLET ORAL
Qty: 90 TABLET | Refills: 0 | OUTPATIENT
Start: 2019-09-16

## 2019-09-20 RX ORDER — TAMSULOSIN HYDROCHLORIDE 0.4 MG/1
1 CAPSULE ORAL DAILY
Qty: 90 CAPSULE | Refills: 1 | Status: SHIPPED | OUTPATIENT
Start: 2019-09-20 | End: 2020-03-24

## 2019-09-20 RX ORDER — ATORVASTATIN CALCIUM 20 MG/1
20 TABLET, FILM COATED ORAL DAILY
Qty: 90 TABLET | Refills: 1 | Status: SHIPPED | OUTPATIENT
Start: 2019-09-20 | End: 2020-03-24

## 2019-09-20 RX ORDER — LISINOPRIL 10 MG/1
10 TABLET ORAL DAILY
Qty: 90 TABLET | Refills: 1 | Status: SHIPPED | OUTPATIENT
Start: 2019-09-20 | End: 2020-03-24

## 2019-10-21 DIAGNOSIS — I10 ESSENTIAL (PRIMARY) HYPERTENSION: ICD-10-CM

## 2019-10-21 DIAGNOSIS — E11.42 TYPE 2 DIABETES MELLITUS WITH DIABETIC POLYNEUROPATHY, WITHOUT LONG-TERM CURRENT USE OF INSULIN (HCC): ICD-10-CM

## 2019-10-21 DIAGNOSIS — E53.8 VITAMIN B12 DEFICIENCY: Primary | ICD-10-CM

## 2019-10-21 DIAGNOSIS — E78.00 PURE HYPERCHOLESTEROLEMIA: ICD-10-CM

## 2019-10-22 ENCOUNTER — RESULTS ENCOUNTER (OUTPATIENT)
Dept: INTERNAL MEDICINE | Facility: CLINIC | Age: 54
End: 2019-10-22

## 2019-10-22 DIAGNOSIS — E53.8 VITAMIN B12 DEFICIENCY: ICD-10-CM

## 2019-10-22 DIAGNOSIS — E78.00 PURE HYPERCHOLESTEROLEMIA: ICD-10-CM

## 2019-10-22 DIAGNOSIS — I10 ESSENTIAL (PRIMARY) HYPERTENSION: ICD-10-CM

## 2019-10-22 DIAGNOSIS — E11.42 TYPE 2 DIABETES MELLITUS WITH DIABETIC POLYNEUROPATHY, WITHOUT LONG-TERM CURRENT USE OF INSULIN (HCC): ICD-10-CM

## 2019-10-25 LAB
ALBUMIN SERPL-MCNC: 4.8 G/DL (ref 3.5–5.2)
ALBUMIN/CREAT UR: 13.5 MG/G CREAT (ref 0–30)
ALBUMIN/GLOB SERPL: 2.1 G/DL
ALP SERPL-CCNC: 70 U/L (ref 39–117)
ALT SERPL-CCNC: 40 U/L (ref 1–41)
APPEARANCE UR: CLEAR
AST SERPL-CCNC: 34 U/L (ref 1–40)
BACTERIA #/AREA URNS HPF: ABNORMAL /HPF
BACTERIA UR CULT: NORMAL
BACTERIA UR CULT: NORMAL
BASOPHILS # BLD AUTO: 0.04 10*3/MM3 (ref 0–0.2)
BASOPHILS NFR BLD AUTO: 0.3 % (ref 0–1.5)
BILIRUB SERPL-MCNC: 1.4 MG/DL (ref 0.2–1.2)
BILIRUB UR QL STRIP: NEGATIVE
BUN SERPL-MCNC: 13 MG/DL (ref 6–20)
BUN/CREAT SERPL: 18.8 (ref 7–25)
CALCIUM SERPL-MCNC: 9.9 MG/DL (ref 8.6–10.5)
CHLORIDE SERPL-SCNC: 101 MMOL/L (ref 98–107)
CHOLEST SERPL-MCNC: 141 MG/DL (ref 0–200)
CO2 SERPL-SCNC: 27.1 MMOL/L (ref 22–29)
COLOR UR: YELLOW
CREAT SERPL-MCNC: 0.69 MG/DL (ref 0.76–1.27)
CREAT UR-MCNC: 263.9 MG/DL
CRYSTALS URNS MICRO: ABNORMAL
EOSINOPHIL # BLD AUTO: 0.35 10*3/MM3 (ref 0–0.4)
EOSINOPHIL NFR BLD AUTO: 2.9 % (ref 0.3–6.2)
EPI CELLS #/AREA URNS HPF: ABNORMAL /HPF
ERYTHROCYTE [DISTWIDTH] IN BLOOD BY AUTOMATED COUNT: 13.7 % (ref 12.3–15.4)
GLOBULIN SER CALC-MCNC: 2.3 GM/DL
GLUCOSE SERPL-MCNC: 145 MG/DL (ref 65–99)
GLUCOSE UR QL: NEGATIVE
HBA1C MFR BLD: 7.2 % (ref 4.8–5.6)
HCT VFR BLD AUTO: 44.2 % (ref 37.5–51)
HDLC SERPL-MCNC: 39 MG/DL (ref 40–60)
HGB BLD-MCNC: 15.1 G/DL (ref 13–17.7)
HGB UR QL STRIP: NEGATIVE
IMM GRANULOCYTES # BLD AUTO: 0.05 10*3/MM3 (ref 0–0.05)
IMM GRANULOCYTES NFR BLD AUTO: 0.4 % (ref 0–0.5)
KETONES UR QL STRIP: ABNORMAL
LDLC SERPL CALC-MCNC: 71 MG/DL (ref 0–100)
LDLC/HDLC SERPL: 1.82 {RATIO}
LEUKOCYTE ESTERASE UR QL STRIP: ABNORMAL
LYMPHOCYTES # BLD AUTO: 2.05 10*3/MM3 (ref 0.7–3.1)
LYMPHOCYTES NFR BLD AUTO: 16.7 % (ref 19.6–45.3)
MCH RBC QN AUTO: 30.8 PG (ref 26.6–33)
MCHC RBC AUTO-ENTMCNC: 34.2 G/DL (ref 31.5–35.7)
MCV RBC AUTO: 90 FL (ref 79–97)
MICRO URNS: ABNORMAL
MICROALBUMIN UR-MCNC: 35.7 UG/ML
MONOCYTES # BLD AUTO: 0.61 10*3/MM3 (ref 0.1–0.9)
MONOCYTES NFR BLD AUTO: 5 % (ref 5–12)
MUCOUS THREADS URNS QL MICRO: PRESENT /HPF
NEUTROPHILS # BLD AUTO: 9.17 10*3/MM3 (ref 1.7–7)
NEUTROPHILS NFR BLD AUTO: 74.7 % (ref 42.7–76)
NITRITE UR QL STRIP: NEGATIVE
NRBC BLD AUTO-RTO: 0 /100 WBC (ref 0–0.2)
PH UR STRIP: 6.5 [PH] (ref 5–7.5)
PLATELET # BLD AUTO: 254 10*3/MM3 (ref 140–450)
POTASSIUM SERPL-SCNC: 4.4 MMOL/L (ref 3.5–5.2)
PROT SERPL-MCNC: 7.1 G/DL (ref 6–8.5)
PROT UR QL STRIP: ABNORMAL
RBC # BLD AUTO: 4.91 10*6/MM3 (ref 4.14–5.8)
RBC #/AREA URNS HPF: ABNORMAL /HPF
SODIUM SERPL-SCNC: 142 MMOL/L (ref 136–145)
SP GR UR: 1.03 (ref 1–1.03)
TRIGL SERPL-MCNC: 156 MG/DL (ref 0–150)
UNIDENT CRYS URNS QL MICRO: PRESENT
URINALYSIS REFLEX: ABNORMAL
UROBILINOGEN UR STRIP-MCNC: 0.2 MG/DL (ref 0.2–1)
VIT B12 SERPL-MCNC: 1001 PG/ML (ref 211–946)
VLDLC SERPL CALC-MCNC: 31.2 MG/DL
WBC # BLD AUTO: 12.27 10*3/MM3 (ref 3.4–10.8)
WBC #/AREA URNS HPF: ABNORMAL /HPF

## 2019-10-29 ENCOUNTER — OFFICE VISIT (OUTPATIENT)
Dept: INTERNAL MEDICINE | Facility: CLINIC | Age: 54
End: 2019-10-29

## 2019-10-29 VITALS
SYSTOLIC BLOOD PRESSURE: 124 MMHG | TEMPERATURE: 98.1 F | WEIGHT: 238 LBS | RESPIRATION RATE: 16 BRPM | OXYGEN SATURATION: 98 % | BODY MASS INDEX: 33.32 KG/M2 | DIASTOLIC BLOOD PRESSURE: 82 MMHG | HEART RATE: 68 BPM | HEIGHT: 71 IN

## 2019-10-29 DIAGNOSIS — R07.9 CHEST PAIN ON EXERTION: ICD-10-CM

## 2019-10-29 DIAGNOSIS — R06.02 SOB (SHORTNESS OF BREATH) ON EXERTION: ICD-10-CM

## 2019-10-29 DIAGNOSIS — E78.00 PURE HYPERCHOLESTEROLEMIA: ICD-10-CM

## 2019-10-29 DIAGNOSIS — R00.2 POUNDING HEARTBEAT: ICD-10-CM

## 2019-10-29 DIAGNOSIS — F17.219 CIGARETTE NICOTINE DEPENDENCE WITH NICOTINE-INDUCED DISORDER: ICD-10-CM

## 2019-10-29 DIAGNOSIS — R42 POSTURAL DIZZINESS WITH PRESYNCOPE: ICD-10-CM

## 2019-10-29 DIAGNOSIS — I10 ESSENTIAL (PRIMARY) HYPERTENSION: Primary | ICD-10-CM

## 2019-10-29 DIAGNOSIS — E11.42 TYPE 2 DIABETES MELLITUS WITH DIABETIC POLYNEUROPATHY, WITHOUT LONG-TERM CURRENT USE OF INSULIN (HCC): ICD-10-CM

## 2019-10-29 DIAGNOSIS — R55 POSTURAL DIZZINESS WITH PRESYNCOPE: ICD-10-CM

## 2019-10-29 DIAGNOSIS — I49.9 IRREGULAR HEART BEAT: ICD-10-CM

## 2019-10-29 DIAGNOSIS — E11.42 DIABETIC PERIPHERAL NEUROPATHY (HCC): ICD-10-CM

## 2019-10-29 PROCEDURE — 93000 ELECTROCARDIOGRAM COMPLETE: CPT | Performed by: INTERNAL MEDICINE

## 2019-10-29 PROCEDURE — 99214 OFFICE O/P EST MOD 30 MIN: CPT | Performed by: INTERNAL MEDICINE

## 2019-10-29 RX ORDER — PREGABALIN 50 MG/1
50 CAPSULE ORAL 2 TIMES DAILY
Qty: 60 CAPSULE | Refills: 0 | Status: SHIPPED | OUTPATIENT
Start: 2019-10-29 | End: 2021-02-22

## 2019-10-29 RX ORDER — VARENICLINE TARTRATE 1 MG/1
1 TABLET, FILM COATED ORAL 2 TIMES DAILY
Qty: 60 TABLET | Refills: 5 | Status: SHIPPED | OUTPATIENT
Start: 2019-10-29 | End: 2020-07-30

## 2019-10-29 RX ORDER — TIZANIDINE 4 MG/1
4 TABLET ORAL 2 TIMES DAILY PRN
Qty: 60 TABLET | Refills: 1 | Status: SHIPPED | OUTPATIENT
Start: 2019-10-29

## 2019-10-29 NOTE — PROGRESS NOTES
Flaco Miles Chi is a 54 y.o. male, who presents with a chief complaint of   Chief Complaint   Patient presents with   • Follow-up     3 x month, lab results   • Diabetes   • Hypertension       55 yo M with chronic low back pain, diabetes, HTN and HLD.     He has been having some irregular heart beat and more forceful heart beat at times at rest and on exertion. Does feel a tightness at times on exertion. Has had stress test and heart cath in past that was negative per him. SOB when he has the tightness and is chronic, but is worsening. No longer hunting a lone because he is concerned about this.     Doing well on blood pressure medications. Tolerating well. Does not check BP at home. Incidentally, he has fallen 5 times in the last month. Feels that this is occurring when standing. One episode happened when he was urinating.     Doing well on this diabetes medication. Not changed his diet much.     Taking Cymbalta and doing well on this. Feels that Lyrica has helped a lot with his pain.          The following portions of the patient's history were reviewed and updated as appropriate: allergies, current medications, past family history, past medical history, past social history, past surgical history and problem list.    Allergies: Fexofenadine-pseudoephed er    Current Outpatient Medications:   •  allopurinol (ZYLOPRIM) 300 MG tablet, TAKE 1 TABLET BY MOUTH  DAILY, Disp: 90 tablet, Rfl: 1  •  amLODIPine (NORVASC) 5 MG tablet, TAKE 1 TABLET BY MOUTH  EVERY NIGHT, Disp: 90 tablet, Rfl: 1  •  atorvastatin (LIPITOR) 20 MG tablet, TAKE 1 TABLET BY MOUTH  DAILY, Disp: 90 tablet, Rfl: 1  •  DULoxetine (CYMBALTA) 30 MG capsule, Take 1 capsule by mouth Daily., Disp: 30 capsule, Rfl: 0  •  lisinopril (PRINIVIL,ZESTRIL) 10 MG tablet, TAKE 1 TABLET BY MOUTH  DAILY, Disp: 90 tablet, Rfl: 1  •  metFORMIN (GLUCOPHAGE) 1000 MG tablet, TAKE 1 TABLET BY MOUTH TWO  TIMES DAILY WITH MEALS, Disp: 180 tablet, Rfl: 1  •   "oxyCODONE-acetaminophen (PERCOCET)  MG per tablet, Take 0.5-1 tablets by mouth Every 8 (Eight) Hours As Needed for Severe Pain ., Disp: 90 tablet, Rfl: 0  •  pregabalin (LYRICA) 50 MG capsule, Take 1 capsule by mouth 2 (Two) Times a Day., Disp: 60 capsule, Rfl: 0  •  tamsulosin (FLOMAX) 0.4 MG capsule 24 hr capsule, TAKE 1 CAPSULE BY MOUTH  DAILY, Disp: 90 capsule, Rfl: 1  •  tiZANidine (ZANAFLEX) 4 MG tablet, Take 1 tablet by mouth 2 (Two) Times a Day As Needed for Muscle Spasms., Disp: 60 tablet, Rfl: 1  •  varenicline (CHANTIX CONTINUING MONTH GAYLE) 1 MG tablet, Take 1 tablet by mouth 2 (Two) Times a Day., Disp: 60 tablet, Rfl: 5  •  varenicline (CHANTIX STARTING MONTH GAYLE) 0.5 MG X 11 & 1 MG X 42 tablet, Take 0.5 mg one daily on days 1-3 and and 0.5 mg twice daily on days 4-7.Then 1 mg twice daily for a total of 12 weeks., Disp: 53 tablet, Rfl: 0  Medications Discontinued During This Encounter   Medication Reason   • tiZANidine (ZANAFLEX) 4 MG tablet Reorder   • pregabalin (LYRICA) 50 MG capsule Reorder       Review of Systems   Constitutional: Positive for fatigue. Negative for chills.   HENT: Negative for congestion.    Respiratory: Positive for shortness of breath. Negative for cough.    Cardiovascular: Positive for chest pain and palpitations.   Gastrointestinal: Negative for abdominal pain, constipation, diarrhea and nausea.   Musculoskeletal: Positive for arthralgias and back pain.   Neurological: Positive for dizziness, light-headedness and numbness. Negative for tremors and syncope.             /82 (BP Location: Left arm, Patient Position: Sitting, Cuff Size: Large Adult)   Pulse 68   Temp 98.1 °F (36.7 °C) (Oral)   Resp 16   Ht 180.3 cm (71\")   Wt 108 kg (238 lb)   SpO2 98%   BMI 33.19 kg/m²         Physical Exam   Constitutional: He is oriented to person, place, and time. He appears well-developed and well-nourished. No distress.   HENT:   Head: Normocephalic and atraumatic.   Right " Ear: External ear normal.   Left Ear: External ear normal.   Mouth/Throat: Oropharynx is clear and moist. No oropharyngeal exudate.   Eyes: Conjunctivae are normal. Right eye exhibits no discharge. Left eye exhibits no discharge. No scleral icterus.   Cardiovascular: Normal rate, regular rhythm and normal heart sounds. Exam reveals no gallop and no friction rub.   No murmur heard.  Pulmonary/Chest: Effort normal and breath sounds normal. No respiratory distress. He has no wheezes. He has no rales.   Neurological: He is alert and oriented to person, place, and time. He exhibits normal muscle tone. Coordination (walking with cane ) abnormal.   Skin: Skin is warm. Capillary refill takes less than 2 seconds. No rash noted.   Psychiatric: He has a normal mood and affect. His behavior is normal.   Nursing note and vitals reviewed.          Results for orders placed or performed in visit on 10/22/19   Urine culture, Comprehensive - ,   Result Value Ref Range    Urine Culture Final report     Result 1 Comment    Comprehensive metabolic panel   Result Value Ref Range    Glucose 145 (H) 65 - 99 mg/dL    BUN 13 6 - 20 mg/dL    Creatinine 0.69 (L) 0.76 - 1.27 mg/dL    eGFR Non African Am 120 >60 mL/min/1.73    eGFR African Am 145 >60 mL/min/1.73    BUN/Creatinine Ratio 18.8 7.0 - 25.0    Sodium 142 136 - 145 mmol/L    Potassium 4.4 3.5 - 5.2 mmol/L    Chloride 101 98 - 107 mmol/L    Total CO2 27.1 22.0 - 29.0 mmol/L    Calcium 9.9 8.6 - 10.5 mg/dL    Total Protein 7.1 6.0 - 8.5 g/dL    Albumin 4.80 3.50 - 5.20 g/dL    Globulin 2.3 gm/dL    A/G Ratio 2.1 g/dL    Total Bilirubin 1.4 (H) 0.2 - 1.2 mg/dL    Alkaline Phosphatase 70 39 - 117 U/L    AST (SGOT) 34 1 - 40 U/L    ALT (SGPT) 40 1 - 41 U/L   Lipid Panel With LDL / HDL Ratio   Result Value Ref Range    Total Cholesterol 141 0 - 200 mg/dL    Triglycerides 156 (H) 0 - 150 mg/dL    HDL Cholesterol 39 (L) 40 - 60 mg/dL    VLDL Cholesterol 31.2 mg/dL    LDL Cholesterol  71 0 -  100 mg/dL    LDL/HDL Ratio 1.82    Urinalysis With Culture If Indicated -   Result Value Ref Range    Specific Gravity, UA 1.029 1.005 - 1.030    pH, UA 6.5 5.0 - 7.5    Color, UA Yellow Yellow    Appearance, UA Clear Clear    Leukocytes, UA 1+ (A) Negative    Protein Trace Negative/Trace    Glucose, UA Negative Negative    Ketones Trace (A) Negative    Blood, UA Negative Negative    Bilirubin, UA Negative Negative    Urobilinogen, UA 0.2 0.2 - 1.0 mg/dL    Nitrite, UA Negative Negative    Microscopic Examination See below:     Urinalysis Reflex Comment    Microalbumin / Creatinine Urine Ratio - Urine, Clean Catch   Result Value Ref Range    Creatinine, Urine 263.9 Not Estab. mg/dL    Microalbumin, Urine 35.7 Not Estab. ug/mL    Microalbumin/Creatinine Ratio 13.5 0.0 - 30.0 mg/g creat   Hemoglobin A1c   Result Value Ref Range    Hemoglobin A1C 7.20 (H) 4.80 - 5.60 %   Vitamin B12   Result Value Ref Range    Vitamin B-12 1,001 (H) 211 - 946 pg/mL   Microscopic Examination -   Result Value Ref Range    WBC, UA 6-10 (A) 0 - 5 /hpf    RBC, UA 0-2 0 - 2 /hpf    Epithelial Cells (non renal) None seen 0 - 10 /hpf    Crystals, UA Present (A) N/A    Crystal Type Calcium Oxalate N/A    Mucus, UA Present Not Estab. /hpf    Bacteria, UA Few None seen/Few /hpf   CBC & Differential   Result Value Ref Range    WBC 12.27 (H) 3.40 - 10.80 10*3/mm3    RBC 4.91 4.14 - 5.80 10*6/mm3    Hemoglobin 15.1 13.0 - 17.7 g/dL    Hematocrit 44.2 37.5 - 51.0 %    MCV 90.0 79.0 - 97.0 fL    MCH 30.8 26.6 - 33.0 pg    MCHC 34.2 31.5 - 35.7 g/dL    RDW 13.7 12.3 - 15.4 %    Platelets 254 140 - 450 10*3/mm3    Neutrophil Rel % 74.7 42.7 - 76.0 %    Lymphocyte Rel % 16.7 (L) 19.6 - 45.3 %    Monocyte Rel % 5.0 5.0 - 12.0 %    Eosinophil Rel % 2.9 0.3 - 6.2 %    Basophil Rel % 0.3 0.0 - 1.5 %    Neutrophils Absolute 9.17 (H) 1.70 - 7.00 10*3/mm3    Lymphocytes Absolute 2.05 0.70 - 3.10 10*3/mm3    Monocytes Absolute 0.61 0.10 - 0.90 10*3/mm3     Eosinophils Absolute 0.35 0.00 - 0.40 10*3/mm3    Basophils Absolute 0.04 0.00 - 0.20 10*3/mm3    Immature Granulocyte Rel % 0.4 0.0 - 0.5 %    Immature Grans Absolute 0.05 0.00 - 0.05 10*3/mm3    nRBC 0.0 0.0 - 0.2 /100 WBC     ECG 12 Lead  Date/Time: 10/29/2019 12:43 PM  Performed by: Elenita Wray MD  Authorized by: Elenita Wray MD   Comparison: compared with previous ECG from 2/27/2018  Similar to previous ECG  Rhythm: sinus rhythm  Rate: normal  Conduction: conduction normal  Conduction: 1st degree AV block and non-specific intraventricular conduction delay  ST Segments: ST segments normal  T Waves: T waves normal  T flattening: III and aVF  QRS axis: normal  Other: no other findings    Clinical impression: normal ECG                Flaco was seen today for follow-up, diabetes and hypertension.    Diagnoses and all orders for this visit:    Essential (primary) hypertension    Pure hypercholesterolemia    Diabetic peripheral neuropathy (CMS/HCC)    Type 2 diabetes mellitus with diabetic polyneuropathy, without long-term current use of insulin (CMS/HCC)    Chest pain on exertion  -     ECG 12 Lead  -     Stress Test With Myocardial Perfusion One Day    SOB (shortness of breath) on exertion  -     ECG 12 Lead  -     Stress Test With Myocardial Perfusion One Day    Irregular heart beat  -     ECG 12 Lead  -     Stress Test With Myocardial Perfusion One Day    Pounding heartbeat  -     ECG 12 Lead  -     Stress Test With Myocardial Perfusion One Day    Cigarette nicotine dependence with nicotine-induced disorder    Postural dizziness with presyncope    Other orders  -     tiZANidine (ZANAFLEX) 4 MG tablet; Take 1 tablet by mouth 2 (Two) Times a Day As Needed for Muscle Spasms.  -     pregabalin (LYRICA) 50 MG capsule; Take 1 capsule by mouth 2 (Two) Times a Day.  -     varenicline (CHANTIX STARTING MONTH PAK) 0.5 MG X 11 & 1 MG X 42 tablet; Take 0.5 mg one daily on days 1-3 and and 0.5 mg twice daily on  days 4-7.Then 1 mg twice daily for a total of 12 weeks.  -     varenicline (CHANTIX CONTINUING MONTH GAYLE) 1 MG tablet; Take 1 tablet by mouth 2 (Two) Times a Day.      Will check Lexiscan due his CP and palpations and pounding heart beat. EKG is stable with slightly worsening conduction delay. Will call back with results. Knows to go to ER if he acutely worsens.     Discussed smoking cessation and will prescribe Chantix.     Labs are stable. Not under perfect control, but close. Repeat labs in 3-4 months     Orthostatic pressures are stable here. Stay on Norvasc and Lisinopril at current doses. If Lexiscan is negative, would consider checking holter. I also want him to check pressures at home and call me those results. If running low at home, would go down on his Norvasc. May also consider carotid u/s to rule out carotid stenosis although this sounds orthostatic in nature as well as vasovagal when he was urinating.   Return in about 4 months (around 2/29/2020) for Recheck.    Elenita Wray MD  10/29/2019

## 2019-11-06 ENCOUNTER — TELEPHONE (OUTPATIENT)
Dept: INTERNAL MEDICINE | Facility: CLINIC | Age: 54
End: 2019-11-06

## 2019-11-06 NOTE — TELEPHONE ENCOUNTER
Pt states he was advised to ask PCP regarding any RXs he should stop before stress test on 11/18/19. Please advise. Thank you

## 2019-11-07 ENCOUNTER — TELEPHONE (OUTPATIENT)
Dept: INTERNAL MEDICINE | Facility: CLINIC | Age: 54
End: 2019-11-07

## 2019-11-07 NOTE — TELEPHONE ENCOUNTER
11-7-19 patient called to get instruction regarding meds before test on 11/8.  Reply from pcp was shared with pt and he voiced understanding.    2 - pt request call back about med that pain clinic has prescribed which he believes is Seroquel. PCP has prescribed Duloxetine.

## 2019-11-08 ENCOUNTER — HOSPITAL ENCOUNTER (OUTPATIENT)
Dept: NUCLEAR MEDICINE | Facility: HOSPITAL | Age: 54
Discharge: HOME | End: 2019-11-08

## 2019-11-08 ENCOUNTER — HOSPITAL ENCOUNTER (OUTPATIENT)
Dept: CARDIOLOGY | Facility: HOSPITAL | Age: 54
Discharge: HOME | End: 2019-11-08

## 2019-11-08 LAB
BH CV NUCLEAR PRIOR STUDY: 3
BH CV STRESS BP STAGE 1: NORMAL
BH CV STRESS COMMENTS STAGE 1: NORMAL
BH CV STRESS DOSE REGADENOSON STAGE 1: 0.4
BH CV STRESS DURATION MIN STAGE 1: 0
BH CV STRESS DURATION SEC STAGE 1: 30
BH CV STRESS HR STAGE 1: 65
BH CV STRESS O2 STAGE 1: 94
BH CV STRESS PROTOCOL 1: NORMAL
BH CV STRESS RECOVERY BP: NORMAL MMHG
BH CV STRESS RECOVERY HR: 74 BPM
BH CV STRESS RECOVERY O2: 97 %
BH CV STRESS STAGE 1: 1
LV EF NUC BP: 67 %
MAXIMAL PREDICTED HEART RATE: 167 BPM
PERCENT MAX PREDICTED HR: 46.71 %
STRESS BASELINE BP: NORMAL MMHG
STRESS BASELINE HR: 65 BPM
STRESS O2 SAT REST: 92 %
STRESS PERCENT HR: 55 %
STRESS POST ESTIMATED WORKLOAD: 1 METS
STRESS POST EXERCISE DUR SEC: 30 SEC
STRESS POST O2 SAT PEAK: 97 %
STRESS POST PEAK BP: NORMAL MMHG
STRESS POST PEAK HR: 78 BPM
STRESS TARGET HR: 142 BPM

## 2019-11-08 PROCEDURE — 25010000002 REGADENOSON 0.4 MG/5ML SOLUTION: Performed by: INTERNAL MEDICINE

## 2019-11-08 PROCEDURE — 0 TECHNETIUM SESTAMIBI: Performed by: INTERNAL MEDICINE

## 2019-11-08 PROCEDURE — A9500 TC99M SESTAMIBI: HCPCS | Performed by: INTERNAL MEDICINE

## 2019-11-08 PROCEDURE — 93018 CV STRESS TEST I&R ONLY: CPT | Performed by: INTERNAL MEDICINE

## 2019-11-08 PROCEDURE — 78452 HT MUSCLE IMAGE SPECT MULT: CPT | Performed by: INTERNAL MEDICINE

## 2019-11-08 PROCEDURE — 78452 HT MUSCLE IMAGE SPECT MULT: CPT

## 2019-11-08 PROCEDURE — 93016 CV STRESS TEST SUPVJ ONLY: CPT | Performed by: INTERNAL MEDICINE

## 2019-11-08 PROCEDURE — 93017 CV STRESS TEST TRACING ONLY: CPT

## 2019-11-08 RX ADMIN — REGADENOSON 0.4 MG: 0.08 INJECTION, SOLUTION INTRAVENOUS at 09:24

## 2019-11-08 RX ADMIN — TECHNETIUM TC 99M SESTAMIBI 1 DOSE: 1 INJECTION INTRAVENOUS at 07:58

## 2019-11-08 RX ADMIN — TECHNETIUM TC 99M SESTAMIBI 1 DOSE: 1 INJECTION INTRAVENOUS at 09:24

## 2020-02-25 RX ORDER — ALLOPURINOL 300 MG/1
300 TABLET ORAL DAILY
Qty: 90 TABLET | Refills: 1 | OUTPATIENT
Start: 2020-02-25

## 2020-02-25 RX ORDER — AMLODIPINE BESYLATE 5 MG/1
5 TABLET ORAL NIGHTLY
Qty: 90 TABLET | Refills: 1 | OUTPATIENT
Start: 2020-02-25

## 2020-03-24 RX ORDER — TAMSULOSIN HYDROCHLORIDE 0.4 MG/1
1 CAPSULE ORAL DAILY
Qty: 90 CAPSULE | Refills: 1 | Status: SHIPPED | OUTPATIENT
Start: 2020-03-24 | End: 2020-08-12

## 2020-03-24 RX ORDER — ATORVASTATIN CALCIUM 20 MG/1
20 TABLET, FILM COATED ORAL DAILY
Qty: 90 TABLET | Refills: 1 | Status: SHIPPED | OUTPATIENT
Start: 2020-03-24 | End: 2020-08-12

## 2020-03-24 RX ORDER — LISINOPRIL 10 MG/1
10 TABLET ORAL DAILY
Qty: 90 TABLET | Refills: 1 | Status: SHIPPED | OUTPATIENT
Start: 2020-03-24 | End: 2020-07-30

## 2020-07-30 ENCOUNTER — OFFICE VISIT (OUTPATIENT)
Dept: FAMILY MEDICINE CLINIC | Facility: CLINIC | Age: 55
End: 2020-07-30

## 2020-07-30 VITALS
OXYGEN SATURATION: 98 % | BODY MASS INDEX: 32.34 KG/M2 | TEMPERATURE: 97.1 F | SYSTOLIC BLOOD PRESSURE: 140 MMHG | HEART RATE: 68 BPM | HEIGHT: 71 IN | WEIGHT: 231 LBS | DIASTOLIC BLOOD PRESSURE: 80 MMHG

## 2020-07-30 DIAGNOSIS — F51.01 PRIMARY INSOMNIA: ICD-10-CM

## 2020-07-30 DIAGNOSIS — I10 ESSENTIAL (PRIMARY) HYPERTENSION: ICD-10-CM

## 2020-07-30 DIAGNOSIS — Z00.01 ENCOUNTER FOR WELL ADULT EXAM WITH ABNORMAL FINDINGS: ICD-10-CM

## 2020-07-30 DIAGNOSIS — E78.2 MIXED HYPERLIPIDEMIA: ICD-10-CM

## 2020-07-30 DIAGNOSIS — R19.4 BOWEL HABIT CHANGES: ICD-10-CM

## 2020-07-30 DIAGNOSIS — N40.0 BPH WITH ELEVATED PSA: ICD-10-CM

## 2020-07-30 DIAGNOSIS — M25.561 CHRONIC PAIN OF BOTH KNEES: ICD-10-CM

## 2020-07-30 DIAGNOSIS — M25.562 CHRONIC PAIN OF BOTH KNEES: ICD-10-CM

## 2020-07-30 DIAGNOSIS — M1A.0790 IDIOPATHIC CHRONIC GOUT OF ANKLE WITHOUT TOPHUS, UNSPECIFIED LATERALITY: ICD-10-CM

## 2020-07-30 DIAGNOSIS — G47.33 OSA (OBSTRUCTIVE SLEEP APNEA): ICD-10-CM

## 2020-07-30 DIAGNOSIS — G89.29 CHRONIC PAIN OF BOTH KNEES: ICD-10-CM

## 2020-07-30 DIAGNOSIS — Z79.899 HIGH RISK MEDICATION USE: ICD-10-CM

## 2020-07-30 DIAGNOSIS — R55 SYNCOPE, UNSPECIFIED SYNCOPE TYPE: ICD-10-CM

## 2020-07-30 DIAGNOSIS — R74.01 ELEVATED ALT MEASUREMENT: ICD-10-CM

## 2020-07-30 DIAGNOSIS — I95.1 ORTHOSTATIC HYPOTENSION: ICD-10-CM

## 2020-07-30 DIAGNOSIS — E11.42 TYPE 2 DIABETES MELLITUS WITH DIABETIC POLYNEUROPATHY, WITHOUT LONG-TERM CURRENT USE OF INSULIN (HCC): ICD-10-CM

## 2020-07-30 DIAGNOSIS — R97.20 BPH WITH ELEVATED PSA: ICD-10-CM

## 2020-07-30 DIAGNOSIS — R42 VERTIGO: Primary | ICD-10-CM

## 2020-07-30 PROCEDURE — 99205 OFFICE O/P NEW HI 60 MIN: CPT | Performed by: FAMILY MEDICINE

## 2020-07-30 PROCEDURE — G0439 PPPS, SUBSEQ VISIT: HCPCS | Performed by: FAMILY MEDICINE

## 2020-07-30 RX ORDER — LISINOPRIL 2.5 MG/1
2.5 TABLET ORAL DAILY
Qty: 30 TABLET | Refills: 5 | Status: SHIPPED | OUTPATIENT
Start: 2020-07-30 | End: 2020-08-12 | Stop reason: SDUPTHER

## 2020-07-30 RX ORDER — AMLODIPINE BESYLATE 5 MG/1
5 TABLET ORAL NIGHTLY
Qty: 90 TABLET | Refills: 1 | Status: SHIPPED | OUTPATIENT
Start: 2020-07-30 | End: 2020-11-02 | Stop reason: SDUPTHER

## 2020-07-30 NOTE — ASSESSMENT & PLAN NOTE
He states he is not going to stop drinking milk.  He states he is going to cut back from 1/2 gallon per day  to 1/3 gallon per day. He agrees to eliminate bread, pasta, and gluten. He has never had a colonoscopy.  Refer to gastroenterology.

## 2020-07-30 NOTE — PATIENT INSTRUCTIONS
Medicare Wellness  Personal Prevention Plan of Service     Date of Office Visit:  2020  Encounter Provider:  Frank Zambrano Jr., DO  Place of Service:  [facility]  Patient Name: Flaco Miles Sr.  :  1965    As part of the Medicare Wellness portion of your visit today, we are providing you with this personalized preventive plan of services (PPPS). This plan is based upon recommendations of the [place] Preventive Services Task Force (USPSTF) and the Advisory Committee on Immunization Practices (ACIP).    This lists the preventive care services that should be considered, and provides dates of when you are due. Items listed as completed are up-to-date and do not require any further intervention.    Health Maintenance   Topic Date Due   • DIABETIC EYE EXAM  2019   • PROSTATE CANCER SCREENING  10/09/2019   • MEDICARE ANNUAL WELLNESS  10/16/2019   • DIABETIC FOOT EXAM  10/16/2019   • HEMOGLOBIN A1C  2020   • ZOSTER VACCINE (1 of 2) 2020 (Originally 2016)   • INFLUENZA VACCINE  2020   • LIPID PANEL  10/22/2020   • URINE MICROALBUMIN  10/22/2020   • COLONOSCOPY  2027   • TDAP/TD VACCINES (2 - Td) 2027   • HEPATITIS C SCREENING  Completed   • PNEUMOCOCCAL VACCINE (19-64 MEDIUM RISK)  Addressed       No orders of the defined types were placed in this encounter.      No follow-ups on file.

## 2020-07-30 NOTE — ASSESSMENT & PLAN NOTE
Decrease lisinopril from 10 to 2.5 mg daily.  Patient is advised if he gets worsening dizziness when starting at this lower dose of lisinopril to stop the medicine and follow-up here.

## 2020-07-30 NOTE — PROGRESS NOTES
Subsequent Medicare Wellness Visit   The ABC's of the Annual Wellness Visit    Chief Complaint   Patient presents with   • Diabetes   • Back Pain   • Hyperlipidemia   • Hypertension   • Insomnia   • Sleep Apnea   • Dizziness   • Gout       HPI:  Flaco Miles Sr., -1965, is a 55 y.o. male who presents for a Subsequent Medicare Wellness Visit.    Recent Hospitalizations:  No hospitalization(s) within the last year..    Current Medical Providers:  Patient Care Team:  Frank Zambrano Jr., DO as PCP - General (Family Medicine)  Tim Roman MD as PCP - Claims Attributed  Karan Valdivia MD as Consulting Physician (Orthopedic Surgery)  Douglas Barron MD as Consulting Physician (Urology)  Tim Roman MD (Pain Medicine)  Vinod Crooks MD as Consulting Physician (Orthopedic Surgery)    Health Habits and Functional and Cognitive Screening and Depression Screening:  Functional & Cognitive Status 2020   Do you have difficulty preparing food and eating? No   Do you have difficulty bathing yourself, getting dressed or grooming yourself? No   Do you have difficulty using the toilet? No   Do you have difficulty moving around from place to place? Yes   Do you have trouble with steps or getting out of a bed or a chair? Yes   Current Diet Well Balanced Diet   Dental Exam Up to date   Eye Exam Not up to date   Exercise (times per week) 0 times per week   Current Exercise Activities Include None   Do you need help using the phone?  No   Are you deaf or do you have serious difficulty hearing?  No   Do you need help with transportation? No   Do you need help shopping? No   Do you need help preparing meals?  No   Do you need help with housework?  No   Do you need help with laundry? No   Do you need help taking your medications? No   Do you need help managing money? No   Do you ever drive or ride in a car without wearing a seat belt? No   Have you felt unusual stress, anger or loneliness in the last month? No    Who do you live with? Spouse   If you need help, do you have trouble finding someone available to you? No   Have you been bothered in the last four weeks by sexual problems? No   Do you have difficulty concentrating, remembering or making decisions? No       Compared to one year ago, the patient feels his physical health is the same and his mental health is the same.    Depression Screen:  PHQ-2/PHQ-9 Depression Screening 7/30/2020   Little interest or pleasure in doing things 2   Feeling down, depressed, or hopeless 0   Trouble falling or staying asleep, or sleeping too much 3   Feeling tired or having little energy 3   Poor appetite or overeating 3   Feeling bad about yourself - or that you are a failure or have let yourself or your family down 1   Trouble concentrating on things, such as reading the newspaper or watching television 3   Moving or speaking so slowly that other people could have noticed. Or the opposite - being so fidgety or restless that you have been moving around a lot more than usual 0   Thoughts that you would be better off dead, or of hurting yourself in some way 0   Total Score 15   If you checked off any problems, how difficult have these problems made it for you to do your work, take care of things at home, or get along with other people? Extremely dIfficult         Past Medical/Family/Social History:  The following portions of the patient's history were reviewed and updated as appropriate: allergies, current medications, past family history, past medical history, past social history, past surgical history and problem list.    Allergies   Allergen Reactions   • Fexofenadine-Pseudoephed Er Other (See Comments)     TACHYCARDIA         Current Outpatient Medications:   •  allopurinol (ZYLOPRIM) 300 MG tablet, TAKE 1 TABLET BY MOUTH  DAILY, Disp: 90 tablet, Rfl: 1  •  amLODIPine (NORVASC) 5 MG tablet, TAKE 1 TABLET BY MOUTH  EVERY NIGHT, Disp: 90 tablet, Rfl: 1  •  atorvastatin (LIPITOR) 20  MG tablet, TAKE 1 TABLET BY MOUTH  DAILY, Disp: 90 tablet, Rfl: 1  •  DULoxetine (CYMBALTA) 30 MG capsule, Take 1 capsule by mouth Daily., Disp: 30 capsule, Rfl: 0  •  lisinopril (PRINIVIL,ZESTRIL) 10 MG tablet, TAKE 1 TABLET BY MOUTH  DAILY, Disp: 90 tablet, Rfl: 1  •  metFORMIN (GLUCOPHAGE) 1000 MG tablet, TAKE 1 TABLET BY MOUTH TWO  TIMES DAILY WITH MEALS, Disp: 180 tablet, Rfl: 1  •  oxyCODONE-acetaminophen (PERCOCET)  MG per tablet, Take 0.5-1 tablets by mouth Every 8 (Eight) Hours As Needed for Severe Pain ., Disp: 90 tablet, Rfl: 0  •  pregabalin (LYRICA) 50 MG capsule, Take 1 capsule by mouth 2 (Two) Times a Day., Disp: 60 capsule, Rfl: 0  •  tamsulosin (FLOMAX) 0.4 MG capsule 24 hr capsule, TAKE 1 CAPSULE BY MOUTH  DAILY, Disp: 90 capsule, Rfl: 1  •  tiZANidine (ZANAFLEX) 4 MG tablet, Take 1 tablet by mouth 2 (Two) Times a Day As Needed for Muscle Spasms., Disp: 60 tablet, Rfl: 1  •  varenicline (CHANTIX CONTINUING MONTH PAK) 1 MG tablet, Take 1 tablet by mouth 2 (Two) Times a Day., Disp: 60 tablet, Rfl: 5  •  varenicline (CHANTIX STARTING MONTH PAK) 0.5 MG X 11 & 1 MG X 42 tablet, Take 0.5 mg one daily on days 1-3 and and 0.5 mg twice daily on days 4-7.Then 1 mg twice daily for a total of 12 weeks., Disp: 53 tablet, Rfl: 0    Aspirin use counseling: Does not need ASA (and currently is not on it)    Current medication list contains no high risk medications.  No harmful drug interactions have been identified.     Family History   Problem Relation Age of Onset   • Stroke Mother    • Hypertension Mother    • Alzheimer's disease Father    • Dementia Father    • Hypertension Father    • Alcohol abuse Sister    • Hypertension Sister    • No Known Problems Brother    • Malig Hyperthermia Neg Hx        Social History     Tobacco Use   • Smoking status: Former Smoker     Packs/day: 1.00     Types: Pipe, Cigarettes   • Smokeless tobacco: Former User     Types: Chew   • Tobacco comment: quit Jan 2020   Substance  Use Topics   • Alcohol use: Yes     Comment: bimonthly       Past Surgical History:   Procedure Laterality Date   • ABDOMINAL HERNIA REPAIR      X3   • ADENOIDECTOMY     • ANKLE ARTHROPLASTY Left     Piece of glass removed and cut a tendon    • BACK SURGERY      X4   • CERVICAL FUSION  2015   • CHOLECYSTECTOMY     • ELBOW ARTHROPLASTY Right     Tendon and artery repair from traumatic wound    • INGUINAL HERNIA REPAIR Left     X3   • JOINT MANIPULATION Left 4/16/2018    Procedure: LEFT KNEE MANIPULATION;  Surgeon: Vinod Crooks MD;  Location: [facility];  Service: Orthopedics   • KIDNEY STONE SURGERY      MULITPLE SURGERIES   • KNEE ARTHROPLASTY Left    • KNEE SURGERY Left     X8   • LEG SURGERY      from spider bites   • NECK SURGERY      x 3 (1198, 2003, 20125). Plated and fused   • PROSTATE RADIOACTIVE SEED IMPLANT  01/2017   • PROSTATE SURGERY     • SHOULDER ARTHROSCOPY Right    • TESTICLE SURGERY Right     Hydrocele repair    • TONSILLECTOMY     • TOTAL KNEE ARTHROPLASTY Left 3/5/2018    Procedure: LEFT TOTAL KNEE ARTHROPLASTY;  Surgeon: Vinod Crooks MD;  Location: [facility];  Service:    • UMBILICAL HERNIA REPAIR         Patient Active Problem List   Diagnosis   • Chronic pain   • Diabetic peripheral neuropathy (CMS/HCC)   • Essential (primary) hypertension   • HLD (hyperlipidemia)   • Gout   • Type 2 diabetes mellitus with diabetic polyneuropathy, without long-term current use of insulin (CMS/HCC)   • Swollen scrotum   • Vitamin B12 deficiency   • Chronic fatigue   • Allergic conjunctivitis   • Chronic pain of both knees   • DJD (degenerative joint disease) of knee   • Postlaminectomy syndrome   • Encounter for long-term (current) use of high-risk medication   • BPH with elevated PSA   • CECILIA (obstructive sleep apnea)   • Hydrocele in adult   • Elevated ALT measurement   • Primary insomnia       Review of Systems    Objective     Vitals:    07/30/20 0945   BP: 140/80   BP Location: Left arm  "  Patient Position: Sitting   Cuff Size: Adult   Pulse: 68   Temp: 97.1 °F (36.2 °C)   TempSrc: Temporal   SpO2: 98%   Weight: 105 kg (231 lb)   Height: 180.3 cm (71\")       Patient's Body mass index is 32.22 kg/m². BMI is above normal parameters. Recommendations include: exercise counseling and nutrition counseling.      No exam data present    The patient has no evidence of cognitve impairment.     Physical Exam    Recent Lab Results:  Lab Results   Component Value Date     (H) 10/22/2019     Lab Results   Component Value Date    TRIG 156 (H) 10/22/2019    HDL 39 (L) 10/22/2019    VLDL 31.2 10/22/2019    LDLHDL 1.82 10/22/2019       Assessment/Plan   Age-appropriate Screening Schedule:  Refer to the list below for future screening recommendations based on patient's age, sex and/or medical conditions.      Health Maintenance   Topic Date Due   • DIABETIC EYE EXAM  07/26/2019   • PROSTATE CANCER SCREENING  10/09/2019   • DIABETIC FOOT EXAM  10/16/2019   • HEMOGLOBIN A1C  04/22/2020   • ZOSTER VACCINE (1 of 2) 11/06/2020 (Originally 7/28/2016)   • INFLUENZA VACCINE  08/01/2020   • LIPID PANEL  10/22/2020   • URINE MICROALBUMIN  10/22/2020   • COLONOSCOPY  07/19/2027   • TDAP/TD VACCINES (2 - Td) 07/19/2027   • PNEUMOCOCCAL VACCINE (19-64 MEDIUM RISK)  Addressed       Medicare Risks and Personalized Health Plan:  Obesity/Overweight       CMS-Preventive Services Quick Reference  Medicare Preventive Services Addressed:  Annual Wellness Visit (AWV)    Advance Care Planning:  ACP discussion was declined by the patient. Patient does not have an advance directive, declines further assistance.    There are no diagnoses linked to this encounter.    An After Visit Summary and PPPS with all of these plans were given to the patient.      Follow Up:  No follow-ups on file.                                            "

## 2020-07-30 NOTE — ASSESSMENT & PLAN NOTE
Controlled with metformin 1000 mg twice daily.  No medication side effects.  Continue current treatment.

## 2020-07-30 NOTE — ASSESSMENT & PLAN NOTE
Controlled with atorvastatin 20 mg daily.  No medication side effects.  Continue current treatment.

## 2020-07-30 NOTE — PROGRESS NOTES
"Subjective   Flaco Miles . is a 55 y.o. male is here for   Chief Complaint   Patient presents with   • Diabetes   • Back Pain   • Hyperlipidemia   • Hypertension   • Insomnia   • Sleep Apnea   • Dizziness   • Gout       History of Present Illness     Patient has gout and is taking allopurinol.     He has diabetes and takes metformin 1000 mg twice daily.  His last A1c October 22, 2019 was 7.20.    He has hyperlipidemia and takes atorvastatin 20 mg daily.    He has chronic low back pain and follows with Dr. Tim Roman.  Dr. Roman prescribes his Percocet, Cymbalta, Lyrica, and Zanaflex.  He is on disability for chronic neck and back pain.    He is diabetic peripheral neuropathy.  He currently takes Cymbalta 30 mg daily.    He states he has a severe case of CECILIA. He has had difficulty tolerating CPAP and has not been using it for the past 6 years.    He has hypertension.  His blood pressure today is 140/80.  He is currently taking lisinopril 10 mg daily and amlodipine 5 mg daily.  He states lisinopril made her lightheaded and caused him to pass out so he stopped taking it.  Last month his blood pressure dropped to 98/70 when his friend who is a paramedic checked it for him.  Since he stopped lisinopril he states he has not been getting as dizzy and is feeling better.  He states he still gets dizzy sometimes.  He is dizzy multiple times per day.  He states his dizziness is both a sensation of the room spinning and lightheadedness.  When he goes from a sitting to a standing position he gets dizzy.  Sometimes he gets dizzy when he is changing position and sometimes it happens when is not changing positions.  Sometimes when he is laying down he gets a dizzy sensation.  He states this has been going on for about 15 months.  Sometimes his eyes jerked to the sides and he cannot stop it.  The room spinning sensation only lasts for about 20 to 30 seconds when it happens.  He states he \"blacked out\" about 3 years ago and " "fell back in hit his head on a curio cabinet and lacerated the scalp requiring him to go to the emergency room to get stitches.  He states earlier this year he was in the bathroom and blacked out again and fell on the floor.  He did not go to the ER for that episode.  He states \"I have told Dr. Morgan and Dr. Wray about his blacking out episodes but nothing has ever been done.\"  He states he has never had a CT scan of his head.  Even since he stopped the lisinopril he still gets dizzy when he gets up too quickly.    He states that after he eats within 10 to 20 minutes he has to have a bowel movement.  The stool is not necessarily consistent.  He does not have diarrhea or formed stools consistently.  He has had this problem for about 3 to 4 years.  He has not had any evaluation for this.  He states he gets pelvic pain when this happens and describes the pain is severe and causes him to double over. For breakfast he eats sausage, barnett, ham, pancakes, cereal, cheerios, frosted flakes, vj charms.  He drinks a lot of whole milk, 4 gallons per week.    He used to be a smoker.  He quit once for 20 years and started back and then quit again 3 years ago.    The following portions of the patient's history were reviewed and updated as appropriate: allergies, current medications, past family history, past medical history, past social history, past surgical history and problem list.     reports that he has quit smoking. His smoking use included pipe and cigarettes. He smoked 1.00 pack per day. He has quit using smokeless tobacco.  His smokeless tobacco use included chew. He reports that he drinks alcohol. He reports that he does not use drugs.    Review of Systems   Constitutional: Negative for activity change and unexpected weight change.   HENT: Negative for congestion.    Respiratory: Negative for shortness of breath and wheezing.    Cardiovascular: Negative for chest pain and palpitations.   Gastrointestinal: Positive " "for blood in stool and constipation. Negative for abdominal pain.   Genitourinary: Positive for difficulty urinating and hematuria.   Musculoskeletal: Negative for gait problem.   Skin: Negative for color change and rash.   Psychiatric/Behavioral: Positive for sleep disturbance.        PHQ-9 Depression Screening  Little interest or pleasure in doing things? 2   Feeling down, depressed, or hopeless? 0   Trouble falling or staying asleep, or sleeping too much? 3   Feeling tired or having little energy? 3   Poor appetite or overeating? 3   Feeling bad about yourself - or that you are a failure or have let yourself or your family down? 1   Trouble concentrating on things, such as reading the newspaper or watching television? 3   Moving or speaking so slowly that other people could have noticed? Or the opposite - being so fidgety or restless that you have been moving around a lot more than usual? 0   Thoughts that you would be better off dead, or of hurting yourself in some way? 0   PHQ-9 Total Score 15   If you checked off any problems, how difficult have these problems made it for you to do your work, take care of things at home, or get along with other people? Extremely dIfficult         Objective   /80 (BP Location: Left arm, Patient Position: Sitting, Cuff Size: Adult)   Pulse 68   Temp 97.1 °F (36.2 °C) (Temporal)   Ht 180.3 cm (71\")   Wt 105 kg (231 lb)   SpO2 98%   BMI 32.22 kg/m²   Physical Exam   Constitutional: He is oriented to person, place, and time. He appears well-developed and well-nourished. No distress.   HENT:   Head: Normocephalic and atraumatic.   Right Ear: External ear normal.   Left Ear: External ear normal.   Nose: Nose normal.   Mouth/Throat: Oropharynx is clear and moist. No oropharyngeal exudate.   Eyes: Lids are normal. Right eye exhibits no discharge. Left eye exhibits no discharge. No scleral icterus.   Neck: Trachea normal, normal range of motion and full passive range of " motion without pain. Neck supple. No tracheal deviation and no edema present. No thyromegaly present.   Cardiovascular: Normal rate, regular rhythm, normal heart sounds and intact distal pulses. Exam reveals no gallop and no friction rub.   No murmur heard.  Pulmonary/Chest: Effort normal and breath sounds normal. No stridor. No tachypnea and no bradypnea. No respiratory distress. He has no decreased breath sounds. He has no wheezes. He has no rales. He exhibits no tenderness.   Abdominal: Normal appearance. There is no hepatosplenomegaly.   Musculoskeletal: He exhibits no edema.   Lymphadenopathy:        Head (right side): No submental, no submandibular, no tonsillar, no preauricular, no posterior auricular and no occipital adenopathy present.        Head (left side): No submental, no submandibular, no tonsillar, no preauricular, no posterior auricular and no occipital adenopathy present.     He has no cervical adenopathy.        Right cervical: No superficial cervical, no deep cervical and no posterior cervical adenopathy present.       Left cervical: No superficial cervical, no deep cervical and no posterior cervical adenopathy present.   Neurological: He is alert and oriented to person, place, and time. He has normal strength and normal reflexes. He is not disoriented.   Skin: Skin is warm, dry and intact. Capillary refill takes less than 2 seconds. No rash noted. He is not diaphoretic. No cyanosis or erythema. No pallor. Nails show no clubbing.   Psychiatric: He has a normal mood and affect. His behavior is normal. Cognition and memory are normal.   Nursing note and vitals reviewed.      Procedures    Assessment/Plan   Diagnoses and all orders for this visit:    1. Vertigo (Primary)  Assessment & Plan:  Head CT    Orders:  -     CT Head With & Without Contrast    2. Orthostatic hypotension  -     CT Head With & Without Contrast    3. Bowel habit changes  Assessment & Plan:  He states he is not going to stop  drinking milk.  He states he is going to cut back from 1/2 gallon per day  to 1/3 gallon per day. He agrees to eliminate bread, pasta, and gluten. He has never had a colonoscopy.  Refer to gastroenterology.      Orders:  -     Ambulatory Referral to Gastroenterology    4. Syncope, unspecified syncope type  Assessment & Plan:  CT of the Head. Check bilateral carotid ultrasound and 2D echo.      Orders:  -     US Carotid Bilateral  -     Adult Transthoracic Echo Complete W/ Cont if Necessary Per Protocol; Future    5. High risk medication use  -     Comprehensive metabolic panel  -     TSH  -     CBC w AUTO Differential    6. Idiopathic chronic gout of ankle without tophus, unspecified laterality  Assessment & Plan:  Controlled with allopurinol 300 mg daily.  No medication side effects.  Continue current treatment.      7. Mixed hyperlipidemia  Assessment & Plan:  Controlled with atorvastatin 20 mg daily.  No medication side effects.  Continue current treatment.      Orders:  -     Lipid Panel w/ Chol/HDL Ratio    8. Essential (primary) hypertension  Assessment & Plan:  Decrease lisinopril from 10 to 2.5 mg daily.  Patient is advised if he gets worsening dizziness when starting at this lower dose of lisinopril to stop the medicine and follow-up here.      Orders:  -     amLODIPine (NORVASC) 5 MG tablet; Take 1 tablet by mouth Every Night.  Dispense: 90 tablet; Refill: 1    9. Type 2 diabetes mellitus with diabetic polyneuropathy, without long-term current use of insulin (CMS/[facility])  Assessment & Plan:  Controlled with metformin 1000 mg twice daily.  No medication side effects.  Continue current treatment.      Orders:  -     Hemoglobin A1c    10. Chronic pain of both knees    11. BPH with elevated PSA    12. CECILIA (obstructive sleep apnea)  Assessment & Plan:  He agrees to go to the sleep specialist, get a new CPAP and will keep following up with the sleep specialist until he gets the right mask with the right fit and can  tolerate the CPAP.        13. Primary insomnia    14. Elevated ALT measurement    15. Encounter for well adult exam with abnormal findings  -     Microalbumin / Creatinine Urine Ratio - Urine, Clean Catch  -     Urinalysis With Microscopic - Urine, Clean Catch  -     Uric acid    Other orders  -     lisinopril (PRINIVIL,ZESTRIL) 2.5 MG tablet; Take 1 tablet by mouth Daily.  Dispense: 30 tablet; Refill: 5         I spent over 60 minutes with the patient, of which more than 50% was counseling, including medication side effects, tests to be done, and CECILIA issues and complications. The patient was also counseled on diet and exercise to minimize or eliminate concentrated sweets and sweetened beverages, as well as cutting back on breads and pastas.

## 2020-07-30 NOTE — ASSESSMENT & PLAN NOTE
He agrees to go to the sleep specialist, get a new CPAP and will keep following up with the sleep specialist until he gets the right mask with the right fit and can tolerate the CPAP.

## 2020-07-31 DIAGNOSIS — R17 ELEVATED BILIRUBIN: Primary | ICD-10-CM

## 2020-07-31 LAB
ALBUMIN SERPL-MCNC: 4.9 G/DL (ref 3.8–4.9)
ALBUMIN/CREAT UR: 32 MG/G CREAT (ref 0–29)
ALBUMIN/GLOB SERPL: 2.1 {RATIO} (ref 1.2–2.2)
ALP SERPL-CCNC: 71 IU/L (ref 39–117)
ALT SERPL-CCNC: 24 IU/L (ref 0–44)
APPEARANCE UR: CLEAR
AST SERPL-CCNC: 22 IU/L (ref 0–40)
BACTERIA #/AREA URNS HPF: NORMAL /[HPF]
BASOPHILS # BLD AUTO: 0 X10E3/UL (ref 0–0.2)
BASOPHILS NFR BLD AUTO: 0 %
BILIRUB SERPL-MCNC: 1.4 MG/DL (ref 0–1.2)
BILIRUB UR QL STRIP: NEGATIVE
BUN SERPL-MCNC: 14 MG/DL (ref 6–24)
BUN/CREAT SERPL: 18 (ref 9–20)
CALCIUM SERPL-MCNC: 9.8 MG/DL (ref 8.7–10.2)
CHLORIDE SERPL-SCNC: 101 MMOL/L (ref 96–106)
CHOLEST SERPL-MCNC: 157 MG/DL (ref 100–199)
CHOLEST/HDLC SERPL: 3.9 RATIO (ref 0–5)
CO2 SERPL-SCNC: 26 MMOL/L (ref 20–29)
COLOR UR: YELLOW
CREAT SERPL-MCNC: 0.79 MG/DL (ref 0.76–1.27)
CREAT UR-MCNC: 227.6 MG/DL
EOSINOPHIL # BLD AUTO: 0.4 X10E3/UL (ref 0–0.4)
EOSINOPHIL NFR BLD AUTO: 4 %
EPI CELLS #/AREA URNS HPF: NORMAL /HPF (ref 0–10)
ERYTHROCYTE [DISTWIDTH] IN BLOOD BY AUTOMATED COUNT: 13.6 % (ref 11.6–15.4)
GLOBULIN SER CALC-MCNC: 2.3 G/DL (ref 1.5–4.5)
GLUCOSE SERPL-MCNC: 198 MG/DL (ref 65–99)
GLUCOSE UR QL: ABNORMAL
HBA1C MFR BLD: 6.9 % (ref 4.8–5.6)
HCT VFR BLD AUTO: 45.6 % (ref 37.5–51)
HDLC SERPL-MCNC: 40 MG/DL
HGB BLD-MCNC: 15.1 G/DL (ref 13–17.7)
HGB UR QL STRIP: NEGATIVE
IMM GRANULOCYTES # BLD AUTO: 0 X10E3/UL (ref 0–0.1)
IMM GRANULOCYTES NFR BLD AUTO: 0 %
KETONES UR QL STRIP: NEGATIVE
LDLC SERPL CALC-MCNC: 59 MG/DL (ref 0–99)
LEUKOCYTE ESTERASE UR QL STRIP: NEGATIVE
LYMPHOCYTES # BLD AUTO: 2.7 X10E3/UL (ref 0.7–3.1)
LYMPHOCYTES NFR BLD AUTO: 28 %
MCH RBC QN AUTO: 29.5 PG (ref 26.6–33)
MCHC RBC AUTO-ENTMCNC: 33.1 G/DL (ref 31.5–35.7)
MCV RBC AUTO: 89 FL (ref 79–97)
MICRO URNS: ABNORMAL
MICROALBUMIN UR-MCNC: 72.2 UG/ML
MONOCYTES # BLD AUTO: 0.7 X10E3/UL (ref 0.1–0.9)
MONOCYTES NFR BLD AUTO: 7 %
MUCOUS THREADS URNS QL MICRO: PRESENT
NEUTROPHILS # BLD AUTO: 5.8 X10E3/UL (ref 1.4–7)
NEUTROPHILS NFR BLD AUTO: 61 %
NITRITE UR QL STRIP: NEGATIVE
NTI URINE TUBE (GRAY): NORMAL
PH UR STRIP: 5 [PH] (ref 5–7.5)
PLATELET # BLD AUTO: 257 X10E3/UL (ref 150–450)
POTASSIUM SERPL-SCNC: 4.5 MMOL/L (ref 3.5–5.2)
PROT SERPL-MCNC: 7.2 G/DL (ref 6–8.5)
PROT UR QL STRIP: ABNORMAL
RBC # BLD AUTO: 5.12 X10E6/UL (ref 4.14–5.8)
RBC #/AREA URNS HPF: NORMAL /HPF (ref 0–2)
SODIUM SERPL-SCNC: 141 MMOL/L (ref 134–144)
SP GR UR: >=1.03 (ref 1–1.03)
TRIGL SERPL-MCNC: 291 MG/DL (ref 0–149)
TSH SERPL DL<=0.005 MIU/L-ACNC: 1.88 UIU/ML (ref 0.45–4.5)
URATE SERPL-MCNC: 4.5 MG/DL (ref 3.7–8.6)
UROBILINOGEN UR STRIP-MCNC: 0.2 MG/DL (ref 0.2–1)
VLDLC SERPL CALC-MCNC: 58 MG/DL (ref 5–40)
WBC # BLD AUTO: 9.8 X10E3/UL (ref 3.4–10.8)
WBC #/AREA URNS HPF: NORMAL /HPF (ref 0–5)

## 2020-07-31 NOTE — PROGRESS NOTES
Please call the patient regarding his result(s).  Please let the patient know that his liver and kidney function are normal.  His cholesterol is good.  His triglycerides are high, and I recommend that he eliminate breads, pastas, and gluten from his diet.  He has a mildly elevated bilirubin (liver test) which has been slightly elevated for the past year and is stable.  His hemoglobin A1c is 6.9, which is improved from 7.20 nine months ago.  His thyroid test and blood count are normal.  We can discuss his lab results further at his upcoming August 12, 2020 office visit.  Please add elevated bilirubin and elevated triglycerides to the chief complaint and reason for visit for the August 12, 2020 appointment.

## 2020-08-12 ENCOUNTER — OFFICE VISIT (OUTPATIENT)
Dept: FAMILY MEDICINE CLINIC | Facility: CLINIC | Age: 55
End: 2020-08-12

## 2020-08-12 VITALS
BODY MASS INDEX: 31.78 KG/M2 | DIASTOLIC BLOOD PRESSURE: 96 MMHG | TEMPERATURE: 97.5 F | OXYGEN SATURATION: 98 % | HEIGHT: 71 IN | SYSTOLIC BLOOD PRESSURE: 144 MMHG | HEART RATE: 70 BPM | WEIGHT: 227 LBS

## 2020-08-12 DIAGNOSIS — I10 ESSENTIAL (PRIMARY) HYPERTENSION: Primary | ICD-10-CM

## 2020-08-12 PROCEDURE — 99213 OFFICE O/P EST LOW 20 MIN: CPT | Performed by: FAMILY MEDICINE

## 2020-08-12 RX ORDER — ATORVASTATIN CALCIUM 20 MG/1
20 TABLET, FILM COATED ORAL DAILY
Qty: 90 TABLET | Refills: 1 | Status: SHIPPED | OUTPATIENT
Start: 2020-08-12 | End: 2020-11-02 | Stop reason: SDUPTHER

## 2020-08-12 RX ORDER — LISINOPRIL 2.5 MG/1
2.5 TABLET ORAL DAILY
Qty: 30 TABLET | Refills: 5 | Status: SHIPPED | OUTPATIENT
Start: 2020-08-12 | End: 2020-11-02 | Stop reason: SDUPTHER

## 2020-08-12 RX ORDER — TAMSULOSIN HYDROCHLORIDE 0.4 MG/1
1 CAPSULE ORAL DAILY
Qty: 90 CAPSULE | Refills: 1 | Status: SHIPPED | OUTPATIENT
Start: 2020-08-12 | End: 2020-11-02 | Stop reason: SDUPTHER

## 2020-08-12 NOTE — PROGRESS NOTES
"Subjective   Flaco Miles Sr. is a 55 y.o. male is here for   Chief Complaint   Patient presents with   • Hypertension     with rx changes       History of Present Illness     Patient has hypertension.  He was taking lisinopril 10 mg daily but was experiencing some dizziness.  We decreased the lisinopril to 2.5 mg at his last visit but apparently the prescription did not make it to the pharmacy.  He has not been taking any lisinopril since his last visit.  His blood pressure today is 144/96.  He has been checking his blood pressure at home.  It was 158/96 2 days ago.  He states his blood pressures been running about 140/90 in the evening.  Since he stopped taking lisinopril his dizziness has resolved.  He still gets \"head rushes\" when he stands up, and states \"I will always have that and that will never go away.\"  He states his neurosurgeon told him that he would always have the feeling of being head rushes when he stands up since she had surgery on his cervical spine.  He has plates and cages from C1-C7.    He has benign prostatic hypertrophy and follows with Dr. Jose Barron, urology. He takes Flomax.    He has elevated triglycerides.  He drinks 2 sugar free sodas per day. He eats bread occasionally an does not eat pasta.  He eats potato chips 1-2 times per week.    He states he has many lipomas all over his body.    The following portions of the patient's history were reviewed and updated as appropriate: allergies, current medications, past family history, past medical history, past social history, past surgical history and problem list.     reports that he has quit smoking. His smoking use included pipe and cigarettes. He smoked 1.00 pack per day. He has quit using smokeless tobacco.  His smokeless tobacco use included chew. He reports that he drinks alcohol. He reports that he does not use drugs.    Review of Systems   Constitutional: Negative for activity change and unexpected weight change.   HENT: " "Negative for congestion.    Respiratory: Negative for shortness of breath and wheezing.    Cardiovascular: Negative for chest pain and palpitations.   Gastrointestinal: Positive for constipation. Negative for abdominal pain and blood in stool.   Genitourinary: Negative for difficulty urinating and hematuria.   Musculoskeletal: Negative for gait problem.   Skin: Negative for color change and rash.        PHQ-9 Depression Screening  Little interest or pleasure in doing things?     Feeling down, depressed, or hopeless?     Trouble falling or staying asleep, or sleeping too much?     Feeling tired or having little energy?     Poor appetite or overeating?     Feeling bad about yourself - or that you are a failure or have let yourself or your family down?     Trouble concentrating on things, such as reading the newspaper or watching television?     Moving or speaking so slowly that other people could have noticed? Or the opposite - being so fidgety or restless that you have been moving around a lot more than usual?     Thoughts that you would be better off dead, or of hurting yourself in some way?     PHQ-9 Total Score     If you checked off any problems, how difficult have these problems made it for you to do your work, take care of things at home, or get along with other people?           Objective   /96 (BP Location: Left arm, Patient Position: Sitting, Cuff Size: Adult)   Pulse 70   Temp 97.5 °F (36.4 °C) (Tympanic)   Ht 180.3 cm (71\")   Wt 103 kg (227 lb)   SpO2 98%   BMI 31.66 kg/m²   Physical Exam   Constitutional: He is oriented to person, place, and time. He appears well-developed and well-nourished. No distress.   HENT:   Head: Normocephalic and atraumatic.   Pulmonary/Chest: Effort normal.   Neurological: He is alert and oriented to person, place, and time.   Skin: Skin is warm and dry. He is not diaphoretic.   Psychiatric: He has a normal mood and affect. His behavior is normal. Judgment and " thought content normal.   Nursing note and vitals reviewed.      Procedures    Assessment/Plan   Diagnoses and all orders for this visit:    1. Essential (primary) hypertension (Primary)  Assessment & Plan:  Lisinopril 2.5 was called in to the pharmacy today.  He is can start taking lisinopril 2.5 mg daily, starting today.  If his blood pressure is averaging over 130/80. He is going to increase the amlodipine from 5 to 10 mg daily.  He is going to continue keeping a home blood pressure journal and follow-up in 3 weeks.    Orders:  -     lisinopril (PRINIVIL,ZESTRIL) 2.5 MG tablet; Take 1 tablet by mouth Daily.  Dispense: 30 tablet; Refill: 5

## 2020-08-12 NOTE — ASSESSMENT & PLAN NOTE
Lisinopril 2.5 was called in to the pharmacy today.  He is can start taking lisinopril 2.5 mg daily, starting today.  If his blood pressure is averaging over 130/80. He is going to increase the amlodipine from 5 to 10 mg daily.  He is going to continue keeping a home blood pressure journal and follow-up in 3 weeks.

## 2020-08-17 ENCOUNTER — HOSPITAL ENCOUNTER (OUTPATIENT)
Dept: CT IMAGING | Facility: HOSPITAL | Age: 55
Discharge: HOME | End: 2020-08-17

## 2020-08-17 ENCOUNTER — HOSPITAL ENCOUNTER (OUTPATIENT)
Dept: ULTRASOUND IMAGING | Facility: HOSPITAL | Age: 55
Discharge: HOME | End: 2020-08-17

## 2020-08-17 ENCOUNTER — HOSPITAL ENCOUNTER (OUTPATIENT)
Dept: CARDIOLOGY | Facility: HOSPITAL | Age: 55
Discharge: HOME | End: 2020-08-17
Admitting: FAMILY MEDICINE

## 2020-08-17 VITALS
WEIGHT: 227 LBS | DIASTOLIC BLOOD PRESSURE: 60 MMHG | BODY MASS INDEX: 31.78 KG/M2 | SYSTOLIC BLOOD PRESSURE: 131 MMHG | HEIGHT: 71 IN

## 2020-08-17 DIAGNOSIS — R55 SYNCOPE, UNSPECIFIED SYNCOPE TYPE: ICD-10-CM

## 2020-08-17 LAB
BH CV ECHO MEAS - ACS: 2.3 CM
BH CV ECHO MEAS - AO MAX PG: 15 MMHG
BH CV ECHO MEAS - AO MEAN PG (FULL): 2.5 MMHG
BH CV ECHO MEAS - AO MEAN PG: 6.5 MMHG
BH CV ECHO MEAS - AO ROOT AREA (BSA CORRECTED): 1.7
BH CV ECHO MEAS - AO ROOT AREA: 11.3 CM^2
BH CV ECHO MEAS - AO ROOT DIAM: 3.8 CM
BH CV ECHO MEAS - AO V2 MAX: 194 CM/SEC
BH CV ECHO MEAS - AO V2 MEAN: 117 CM/SEC
BH CV ECHO MEAS - AO V2 VTI: 31.7 CM
BH CV ECHO MEAS - ASC AORTA: 3.2 CM
BH CV ECHO MEAS - AVA(I,A): 2.7 CM^2
BH CV ECHO MEAS - AVA(I,D): 2.7 CM^2
BH CV ECHO MEAS - BSA(HAYCOCK): 2.3 M^2
BH CV ECHO MEAS - BSA: 2.2 M^2
BH CV ECHO MEAS - BZI_BMI: 31.7 KILOGRAMS/M^2
BH CV ECHO MEAS - BZI_METRIC_HEIGHT: 180.3 CM
BH CV ECHO MEAS - BZI_METRIC_WEIGHT: 103 KG
BH CV ECHO MEAS - EDV(CUBED): 149.2 ML
BH CV ECHO MEAS - EDV(MOD-SP2): 91.1 ML
BH CV ECHO MEAS - EDV(MOD-SP4): 114 ML
BH CV ECHO MEAS - EDV(TEICH): 135.5 ML
BH CV ECHO MEAS - EF(CUBED): 81 %
BH CV ECHO MEAS - EF(MOD-BP): 66.4 %
BH CV ECHO MEAS - EF(MOD-SP2): 67.8 %
BH CV ECHO MEAS - EF(MOD-SP4): 65.7 %
BH CV ECHO MEAS - EF(TEICH): 73.1 %
BH CV ECHO MEAS - ESV(CUBED): 28.4 ML
BH CV ECHO MEAS - ESV(MOD-SP2): 29.3 ML
BH CV ECHO MEAS - ESV(MOD-SP4): 39.1 ML
BH CV ECHO MEAS - ESV(TEICH): 36.4 ML
BH CV ECHO MEAS - FS: 42.5 %
BH CV ECHO MEAS - IVS/LVPW: 1
BH CV ECHO MEAS - IVSD: 1.1 CM
BH CV ECHO MEAS - LAT PEAK E' VEL: 6.7 CM/SEC
BH CV ECHO MEAS - LV DIASTOLIC VOL/BSA (35-75): 51.2 ML/M^2
BH CV ECHO MEAS - LV MASS(C)D: 226.5 GRAMS
BH CV ECHO MEAS - LV MASS(C)DI: 101.8 GRAMS/M^2
BH CV ECHO MEAS - LV MAX PG: 8 MMHG
BH CV ECHO MEAS - LV MEAN PG: 4 MMHG
BH CV ECHO MEAS - LV SYSTOLIC VOL/BSA (12-30): 17.6 ML/M^2
BH CV ECHO MEAS - LV V1 MAX: 143 CM/SEC
BH CV ECHO MEAS - LV V1 MEAN: 94.7 CM/SEC
BH CV ECHO MEAS - LV V1 VTI: 25 CM
BH CV ECHO MEAS - LVIDD: 5.3 CM
BH CV ECHO MEAS - LVIDS: 3.1 CM
BH CV ECHO MEAS - LVLD AP2: 8.6 CM
BH CV ECHO MEAS - LVLD AP4: 8.8 CM
BH CV ECHO MEAS - LVLS AP2: 7.1 CM
BH CV ECHO MEAS - LVLS AP4: 6.8 CM
BH CV ECHO MEAS - LVOT AREA (M): 3.5 CM^2
BH CV ECHO MEAS - LVOT AREA: 3.5 CM^2
BH CV ECHO MEAS - LVOT DIAM: 2.1 CM
BH CV ECHO MEAS - LVPWD: 1.1 CM
BH CV ECHO MEAS - MED PEAK E' VEL: 7 CM/SEC
BH CV ECHO MEAS - MV A DUR: 0.16 SEC
BH CV ECHO MEAS - MV A MAX VEL: 68.9 CM/SEC
BH CV ECHO MEAS - MV DEC SLOPE: 227 CM/SEC^2
BH CV ECHO MEAS - MV DEC TIME: 288 SEC
BH CV ECHO MEAS - MV E MAX VEL: 72.3 CM/SEC
BH CV ECHO MEAS - MV E/A: 1
BH CV ECHO MEAS - MV MEAN PG: 1 MMHG
BH CV ECHO MEAS - MV P1/2T MAX VEL: 81.4 CM/SEC
BH CV ECHO MEAS - MV P1/2T: 105 MSEC
BH CV ECHO MEAS - MV V2 MEAN: 47.3 CM/SEC
BH CV ECHO MEAS - MV V2 VTI: 25.9 CM
BH CV ECHO MEAS - MVA P1/2T LCG: 2.7 CM^2
BH CV ECHO MEAS - MVA(P1/2T): 2.1 CM^2
BH CV ECHO MEAS - MVA(VTI): 3.3 CM^2
BH CV ECHO MEAS - PA ACC TIME: 0.06 SEC
BH CV ECHO MEAS - PA MAX PG: 4.6 MMHG
BH CV ECHO MEAS - PA PR(ACCEL): 52.9 MMHG
BH CV ECHO MEAS - PA V2 MAX: 107 CM/SEC
BH CV ECHO MEAS - PULM A REVS DUR: 0.14 SEC
BH CV ECHO MEAS - PULM A REVS VEL: 18.8 CM/SEC
BH CV ECHO MEAS - PULM DIAS VEL: 49.1 CM/SEC
BH CV ECHO MEAS - PULM S/D: 1.3
BH CV ECHO MEAS - PULM SYS VEL: 64.7 CM/SEC
BH CV ECHO MEAS - QP/QS: 0.67
BH CV ECHO MEAS - RAP SYSTOLE: 3 MMHG
BH CV ECHO MEAS - RV MEAN PG: 1 MMHG
BH CV ECHO MEAS - RV V1 MEAN: 46.6 CM/SEC
BH CV ECHO MEAS - RV V1 VTI: 12.8 CM
BH CV ECHO MEAS - RVOT AREA: 4.5 CM^2
BH CV ECHO MEAS - RVOT DIAM: 2.4 CM
BH CV ECHO MEAS - SI(AO): 161.5 ML/M^2
BH CV ECHO MEAS - SI(CUBED): 54.3 ML/M^2
BH CV ECHO MEAS - SI(LVOT): 38.9 ML/M^2
BH CV ECHO MEAS - SI(MOD-SP2): 27.8 ML/M^2
BH CV ECHO MEAS - SI(MOD-SP4): 33.7 ML/M^2
BH CV ECHO MEAS - SI(TEICH): 44.5 ML/M^2
BH CV ECHO MEAS - SV(AO): 359.5 ML
BH CV ECHO MEAS - SV(CUBED): 120.8 ML
BH CV ECHO MEAS - SV(LVOT): 86.6 ML
BH CV ECHO MEAS - SV(MOD-SP2): 61.8 ML
BH CV ECHO MEAS - SV(MOD-SP4): 74.9 ML
BH CV ECHO MEAS - SV(RVOT): 57.9 ML
BH CV ECHO MEAS - SV(TEICH): 99.1 ML
BH CV ECHO MEAS - TAPSE (>1.6): 2.2 CM
BH CV ECHO MEASUREMENTS AVERAGE E/E' RATIO: 10.55
BH CV VAS BP RIGHT ARM: NORMAL MMHG
BH CV XLRA - RV BASE: 3.7 CM
BH CV XLRA - RV LENGTH: 6.8 CM
BH CV XLRA - RV MID: 2.6 CM
BH CV XLRA - TDI S': 20.2 CM/SEC
LEFT ATRIUM VOLUME INDEX: 29 ML/M2
MAXIMAL PREDICTED HEART RATE: 166 BPM
STRESS TARGET HR: 141 BPM

## 2020-08-17 PROCEDURE — 93306 TTE W/DOPPLER COMPLETE: CPT

## 2020-08-17 PROCEDURE — 93306 TTE W/DOPPLER COMPLETE: CPT | Performed by: INTERNAL MEDICINE

## 2020-08-17 PROCEDURE — 0 IOPAMIDOL PER 1 ML: Performed by: FAMILY MEDICINE

## 2020-08-17 PROCEDURE — 70470 CT HEAD/BRAIN W/O & W/DYE: CPT

## 2020-08-17 PROCEDURE — 93880 EXTRACRANIAL BILAT STUDY: CPT

## 2020-08-17 RX ADMIN — IOPAMIDOL 100 ML: 755 INJECTION, SOLUTION INTRAVENOUS at 11:05

## 2020-11-02 ENCOUNTER — OFFICE VISIT (OUTPATIENT)
Dept: FAMILY MEDICINE CLINIC | Facility: CLINIC | Age: 55
End: 2020-11-02

## 2020-11-02 VITALS
HEART RATE: 61 BPM | DIASTOLIC BLOOD PRESSURE: 80 MMHG | WEIGHT: 236 LBS | SYSTOLIC BLOOD PRESSURE: 130 MMHG | TEMPERATURE: 97.1 F | OXYGEN SATURATION: 98 % | RESPIRATION RATE: 16 BRPM | BODY MASS INDEX: 33.04 KG/M2 | HEIGHT: 71 IN

## 2020-11-02 DIAGNOSIS — Z12.11 ENCOUNTER FOR SCREENING COLONOSCOPY: Primary | ICD-10-CM

## 2020-11-02 DIAGNOSIS — Z79.899 HIGH RISK MEDICATION USE: ICD-10-CM

## 2020-11-02 DIAGNOSIS — R97.20 BPH WITH ELEVATED PSA: ICD-10-CM

## 2020-11-02 DIAGNOSIS — M1A.0790 IDIOPATHIC CHRONIC GOUT OF ANKLE WITHOUT TOPHUS, UNSPECIFIED LATERALITY: ICD-10-CM

## 2020-11-02 DIAGNOSIS — E11.42 TYPE 2 DIABETES MELLITUS WITH DIABETIC POLYNEUROPATHY, WITHOUT LONG-TERM CURRENT USE OF INSULIN (HCC): ICD-10-CM

## 2020-11-02 DIAGNOSIS — N40.0 BPH WITH ELEVATED PSA: ICD-10-CM

## 2020-11-02 DIAGNOSIS — I10 ESSENTIAL (PRIMARY) HYPERTENSION: ICD-10-CM

## 2020-11-02 DIAGNOSIS — E78.2 MIXED HYPERLIPIDEMIA: ICD-10-CM

## 2020-11-02 PROCEDURE — 99214 OFFICE O/P EST MOD 30 MIN: CPT | Performed by: FAMILY MEDICINE

## 2020-11-02 RX ORDER — ALLOPURINOL 300 MG/1
300 TABLET ORAL DAILY
Qty: 90 TABLET | Refills: 1 | Status: SHIPPED | OUTPATIENT
Start: 2020-11-02 | End: 2020-11-19 | Stop reason: SDUPTHER

## 2020-11-02 RX ORDER — TAMSULOSIN HYDROCHLORIDE 0.4 MG/1
1 CAPSULE ORAL DAILY
Qty: 90 CAPSULE | Refills: 1 | Status: SHIPPED | OUTPATIENT
Start: 2020-11-02 | End: 2020-11-19 | Stop reason: SDUPTHER

## 2020-11-02 RX ORDER — ATORVASTATIN CALCIUM 20 MG/1
20 TABLET, FILM COATED ORAL DAILY
Qty: 90 TABLET | Refills: 1 | Status: SHIPPED | OUTPATIENT
Start: 2020-11-02 | End: 2020-11-19 | Stop reason: SDUPTHER

## 2020-11-02 RX ORDER — LISINOPRIL 2.5 MG/1
2.5 TABLET ORAL DAILY
Qty: 30 TABLET | Refills: 5 | Status: SHIPPED | OUTPATIENT
Start: 2020-11-02 | End: 2020-11-19 | Stop reason: SDUPTHER

## 2020-11-02 RX ORDER — AMLODIPINE BESYLATE 5 MG/1
5 TABLET ORAL NIGHTLY
Qty: 90 TABLET | Refills: 1 | Status: SHIPPED | OUTPATIENT
Start: 2020-11-02 | End: 2020-11-19 | Stop reason: SDUPTHER

## 2020-11-02 NOTE — PATIENT INSTRUCTIONS

## 2020-11-02 NOTE — ASSESSMENT & PLAN NOTE
Controlled with Lisinopril 2.5 mg daily and Amlodipine 5 mg daily. No medication side effects. Continue current treatment.

## 2020-11-02 NOTE — PROGRESS NOTES
Subjective   Flaco Miles . is a 55 y.o. male is here for   Chief Complaint   Patient presents with   • Hyperlipidemia   • Diabetes       History of Present Illness     Pt has controlled HLD.    Pt has controlled DM.  He has an Opthalmologist.    Pt states he went to Dr. Jessica's office and waited 2 1/2 hours and left due to the wait time. He would like a referral to another Gastroenterologist.    Health Maintenance Due   Topic Date Due   • DIABETIC EYE EXAM  07/26/2019   • PROSTATE CANCER SCREENING  10/09/2019   • DIABETIC FOOT EXAM  10/16/2019        reports that he has quit smoking. His smoking use included pipe and cigarettes. He smoked 1.00 pack per day. He has quit using smokeless tobacco.  His smokeless tobacco use included chew. He reports current alcohol use. He reports that he does not use drugs.    Review of Systems   Constitutional: Negative for activity change and unexpected weight change.   HENT: Negative for congestion.    Respiratory: Negative for shortness of breath and wheezing.    Cardiovascular: Negative for chest pain and palpitations.   Gastrointestinal: Negative for abdominal pain, blood in stool and constipation.   Genitourinary: Negative for difficulty urinating and hematuria.   Musculoskeletal: Negative for gait problem.   Skin: Negative for color change and rash.        PHQ-9 Depression Screening  Little interest or pleasure in doing things?     Feeling down, depressed, or hopeless?     Trouble falling or staying asleep, or sleeping too much?     Feeling tired or having little energy?     Poor appetite or overeating?     Feeling bad about yourself - or that you are a failure or have let yourself or your family down?     Trouble concentrating on things, such as reading the newspaper or watching television?     Moving or speaking so slowly that other people could have noticed? Or the opposite - being so fidgety or restless that you have been moving around a lot more than usual?    "  Thoughts that you would be better off dead, or of hurting yourself in some way?     PHQ-9 Total Score     If you checked off any problems, how difficult have these problems made it for you to do your work, take care of things at home, or get along with other people?       BP Readings from Last 12 Encounters:   11/02/20 130/80   08/17/20 131/60   08/12/20 144/96   07/30/20 140/80   10/29/19 124/82   07/16/19 130/72   04/16/19 142/82   10/16/18 142/68   08/27/18 130/84   08/17/18 138/70   08/13/18 130/83   07/16/18 149/89       Wt Readings from Last 12 Encounters:   11/02/20 107 kg (236 lb)   09/10/20 106 kg (234 lb 9.6 oz)   08/17/20 103 kg (227 lb)   08/12/20 103 kg (227 lb)   07/30/20 105 kg (231 lb)   10/29/19 108 kg (238 lb)   09/12/19 106 kg (233 lb)   07/16/19 108 kg (239 lb)   04/16/19 109 kg (241 lb)   03/20/19 109 kg (240 lb)   01/04/19 109 kg (241 lb)   10/16/18 112 kg (247 lb)        Objective   /80 (BP Location: Right arm, Patient Position: Sitting, Cuff Size: Large Adult)   Pulse 61   Temp 97.1 °F (36.2 °C) (Temporal)   Resp 16   Ht 180.3 cm (71\")   Wt 107 kg (236 lb)   SpO2 98%   BMI 32.92 kg/m²   Physical Exam  Vitals signs and nursing note reviewed.   Constitutional:       General: He is not in acute distress.     Appearance: Normal appearance. He is well-developed. He is not diaphoretic.   HENT:      Head: Normocephalic and atraumatic.      Right Ear: External ear normal.      Left Ear: External ear normal.      Nose: Nose normal.      Mouth/Throat:      Pharynx: No oropharyngeal exudate.   Eyes:      General: Lids are normal. No scleral icterus.        Right eye: No discharge.         Left eye: No discharge.   Neck:      Musculoskeletal: Full passive range of motion without pain, normal range of motion and neck supple. No edema.      Thyroid: No thyromegaly.      Trachea: Trachea normal. No tracheal deviation.   Cardiovascular:      Rate and Rhythm: Normal rate and regular rhythm.     "  Heart sounds: Normal heart sounds. No murmur. No friction rub. No gallop.    Pulmonary:      Effort: Pulmonary effort is normal. No tachypnea, bradypnea or respiratory distress.      Breath sounds: Normal breath sounds. No stridor. No decreased breath sounds, wheezing or rales.   Chest:      Chest wall: No tenderness.   Lymphadenopathy:      Head:      Right side of head: No submental, submandibular, tonsillar, preauricular, posterior auricular or occipital adenopathy.      Left side of head: No submental, submandibular, tonsillar, preauricular, posterior auricular or occipital adenopathy.      Cervical: No cervical adenopathy.      Right cervical: No superficial, deep or posterior cervical adenopathy.     Left cervical: No superficial, deep or posterior cervical adenopathy.   Skin:     General: Skin is warm and dry.      Capillary Refill: Capillary refill takes less than 2 seconds.      Coloration: Skin is not pale.      Findings: No erythema or rash.      Nails: There is no clubbing.     Neurological:      Mental Status: He is alert and oriented to person, place, and time. He is not disoriented.      Deep Tendon Reflexes: Reflexes are normal and symmetric.   Psychiatric:         Behavior: Behavior normal.         Procedures    Assessment/Plan   Diagnoses and all orders for this visit:    1. Encounter for screening colonoscopy (Primary)  -     Ambulatory Referral to Gastroenterology    2. Mixed hyperlipidemia  Assessment & Plan:  Controlled with atorvastatin 20 mg daily.  No medication side effects.  Continue current treatment.    Orders:  -     Lipid Panel With / Chol / HDL Ratio; Future    3. Essential (primary) hypertension  Assessment & Plan:  Controlled with Lisinopril 2.5 mg daily and Amlodipine 5 mg daily. No medication side effects. Continue current treatment.        4. Type 2 diabetes mellitus with diabetic polyneuropathy, without long-term current use of insulin (CMS/[facility])  Assessment & Plan:  Controlled  with metformin 1000 mg twice daily.  No medication side effects.  Continue current treatment.      Orders:  -     Hemoglobin A1c; Future    5. High risk medication use  -     CBC & Differential; Future  -     Comprehensive Metabolic Panel; Future  -     Hemoglobin A1c; Future  -     Lipid Panel With / Chol / HDL Ratio; Future    Other orders  -     Cancel: Ambulatory Referral to Gastroenterology           Return in about 6 months (around 5/2/2021) for HTN-C, DM2-C, HLD-C with labs 2+ days prior.

## 2020-11-19 DIAGNOSIS — R97.20 BPH WITH ELEVATED PSA: ICD-10-CM

## 2020-11-19 DIAGNOSIS — N40.0 BPH WITH ELEVATED PSA: ICD-10-CM

## 2020-11-19 DIAGNOSIS — E78.2 MIXED HYPERLIPIDEMIA: ICD-10-CM

## 2020-11-19 DIAGNOSIS — M1A.0790 IDIOPATHIC CHRONIC GOUT OF ANKLE WITHOUT TOPHUS, UNSPECIFIED LATERALITY: ICD-10-CM

## 2020-11-19 DIAGNOSIS — E11.42 TYPE 2 DIABETES MELLITUS WITH DIABETIC POLYNEUROPATHY, WITHOUT LONG-TERM CURRENT USE OF INSULIN (HCC): ICD-10-CM

## 2020-11-19 DIAGNOSIS — I10 ESSENTIAL (PRIMARY) HYPERTENSION: ICD-10-CM

## 2020-11-20 ENCOUNTER — TELEPHONE (OUTPATIENT)
Dept: GASTROENTEROLOGY | Facility: CLINIC | Age: 55
End: 2020-11-20

## 2020-11-20 RX ORDER — LISINOPRIL 2.5 MG/1
2.5 TABLET ORAL DAILY
Qty: 30 TABLET | Refills: 5 | Status: SHIPPED | OUTPATIENT
Start: 2020-11-20 | End: 2021-11-16 | Stop reason: SDUPTHER

## 2020-11-20 RX ORDER — ATORVASTATIN CALCIUM 20 MG/1
20 TABLET, FILM COATED ORAL DAILY
Qty: 90 TABLET | Refills: 1 | Status: SHIPPED | OUTPATIENT
Start: 2020-11-20 | End: 2021-08-26

## 2020-11-20 RX ORDER — ALLOPURINOL 300 MG/1
300 TABLET ORAL DAILY
Qty: 90 TABLET | Refills: 1 | Status: SHIPPED | OUTPATIENT
Start: 2020-11-20 | End: 2021-08-26

## 2020-11-20 RX ORDER — TAMSULOSIN HYDROCHLORIDE 0.4 MG/1
1 CAPSULE ORAL DAILY
Qty: 90 CAPSULE | Refills: 1 | Status: SHIPPED | OUTPATIENT
Start: 2020-11-20 | End: 2021-08-26

## 2020-11-20 RX ORDER — AMLODIPINE BESYLATE 5 MG/1
5 TABLET ORAL NIGHTLY
Qty: 90 TABLET | Refills: 1 | Status: SHIPPED | OUTPATIENT
Start: 2020-11-20 | End: 2021-08-26

## 2020-11-27 ENCOUNTER — RESULTS ENCOUNTER (OUTPATIENT)
Dept: FAMILY MEDICINE CLINIC | Facility: CLINIC | Age: 55
End: 2020-11-27

## 2020-11-27 DIAGNOSIS — Z79.899 HIGH RISK MEDICATION USE: ICD-10-CM

## 2020-11-27 DIAGNOSIS — E11.42 TYPE 2 DIABETES MELLITUS WITH DIABETIC POLYNEUROPATHY, WITHOUT LONG-TERM CURRENT USE OF INSULIN (HCC): ICD-10-CM

## 2020-11-27 DIAGNOSIS — R17 ELEVATED BILIRUBIN: ICD-10-CM

## 2020-12-01 ENCOUNTER — TELEPHONE (OUTPATIENT)
Dept: GASTROENTEROLOGY | Facility: CLINIC | Age: 55
End: 2020-12-01

## 2020-12-01 NOTE — TELEPHONE ENCOUNTER
Screening colonoscopy-- family hx of polyps-- no ASA or blood thinners-- Medications in epic confirmed.    allopurinol (ZYLOPRIM) 300 MG tablet      amLODIPine (NORVASC) 5 MG tablet     atorvastatin (LIPITOR) 20 MG tablet     DULoxetine (CYMBALTA) 30 MG capsule     lisinopril (PRINIVIL,ZESTRIL) 2.5 MG tablet     metFORMIN (GLUCOPHAGE) 1000 MG tablet     oxyCODONE-acetaminophen (PERCOCET)  MG per tablet     pregabalin (LYRICA) 50 MG capsule     tamsulosin (FLOMAX) 0.4 MG capsule 24 hr capsule     tiZANidine (ZANAFLEX) 4 MG tablet        OA form scanned into media.    Thank you

## 2020-12-02 ENCOUNTER — TELEPHONE (OUTPATIENT)
Dept: GASTROENTEROLOGY | Facility: CLINIC | Age: 55
End: 2020-12-02

## 2020-12-02 DIAGNOSIS — Z83.71 FAMILY HISTORY OF POLYPS IN THE COLON: Primary | ICD-10-CM

## 2020-12-02 DIAGNOSIS — Z12.11 COLON CANCER SCREENING: ICD-10-CM

## 2020-12-02 RX ORDER — SODIUM CHLORIDE, SODIUM LACTATE, POTASSIUM CHLORIDE, CALCIUM CHLORIDE 600; 310; 30; 20 MG/100ML; MG/100ML; MG/100ML; MG/100ML
30 INJECTION, SOLUTION INTRAVENOUS CONTINUOUS
Status: CANCELLED | OUTPATIENT
Start: 2020-12-02

## 2020-12-09 NOTE — TELEPHONE ENCOUNTER
3rd call to schedule no answer lm on vm / mailed pt scheduling reminder letter    2nd call noted in referral tab

## 2021-01-14 ENCOUNTER — OFFICE VISIT (OUTPATIENT)
Dept: GASTROENTEROLOGY | Facility: CLINIC | Age: 56
End: 2021-01-14

## 2021-01-14 VITALS
SYSTOLIC BLOOD PRESSURE: 140 MMHG | HEIGHT: 71 IN | BODY MASS INDEX: 33.74 KG/M2 | WEIGHT: 241 LBS | DIASTOLIC BLOOD PRESSURE: 78 MMHG

## 2021-01-14 DIAGNOSIS — K62.5 RECTAL BLEEDING: Primary | ICD-10-CM

## 2021-01-14 DIAGNOSIS — Z12.11 COLON CANCER SCREENING: ICD-10-CM

## 2021-01-14 DIAGNOSIS — K59.03 DRUG-INDUCED CONSTIPATION: Chronic | ICD-10-CM

## 2021-01-14 PROCEDURE — 99204 OFFICE O/P NEW MOD 45 MIN: CPT | Performed by: INTERNAL MEDICINE

## 2021-01-14 RX ORDER — SODIUM CHLORIDE, SODIUM LACTATE, POTASSIUM CHLORIDE, CALCIUM CHLORIDE 600; 310; 30; 20 MG/100ML; MG/100ML; MG/100ML; MG/100ML
30 INJECTION, SOLUTION INTRAVENOUS CONTINUOUS
Status: CANCELLED | OUTPATIENT
Start: 2021-02-16

## 2021-01-14 NOTE — PROGRESS NOTES
"Chief Complaint   Patient presents with   • Constipation   • Rectal Bleeding       Subjective     HPI    Flaco Miles Chi is a 55 y.o. male with a past medical history noted below who presents for evaluation of constipation, rectal bleeding, and need for colonoscopy.    Patient has been chronic.  This is due to chronic pain medication.  He has back pain and has been on Percocet 4 times daily for about 30 or more years.  He does take Dulcolax to have bowel movements.  Bowel movements vary.  Sometimes he can go months where he is quite normal, then sometimes he has harder stools.  He says it just depends on what he eats.  He does report that at times he has blood in his stool.  It is not with every bowel movement.  He sees bright red blood in the water and with wiping.  Mostly associated with straining.    He denies that he has any heartburn or acid reflux symptoms.  Not taking any NSAIDs.  No issues with nausea or vomiting.  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    Today's visit was in the office.  Both the patient and I were wearing face masks and proper hand hygiene was performed before and after the physical exam.       Past Medical History:   Diagnosis Date   • Acute nasopharyngitis (common cold)     ON ANTIBX, THICK PRODUCTIVE COUGH   • Anesthesia complication     WAKES VIOLENTLY.  UNKNOWN REASON   • Arthrofibrosis of knee joint, left    • DDD (degenerative disc disease), cervical    • Diabetes mellitus (CMS/[facility])    • DJD (degenerative joint disease)    • Gout    • History of kidney stones    • Hyperlipidemia    • Hypertension    • Left knee pain    • Migraines    • Prostate cancer (CMS/HCC) 01/2017    SEED IMPLANTS   • Sleep apnea     NO MACHINE   • Sleep disorder          Current " Outpatient Medications:   •  allopurinol (ZYLOPRIM) 300 MG tablet, Take 1 tablet by mouth Daily., Disp: 90 tablet, Rfl: 1  •  amLODIPine (NORVASC) 5 MG tablet, Take 1 tablet by mouth Every Night., Disp: 90 tablet, Rfl: 1  •  atorvastatin (LIPITOR) 20 MG tablet, Take 1 tablet by mouth Daily., Disp: 90 tablet, Rfl: 1  •  DULoxetine (CYMBALTA) 30 MG capsule, Take 1 capsule by mouth Daily., Disp: 30 capsule, Rfl: 0  •  lisinopril (PRINIVIL,ZESTRIL) 2.5 MG tablet, Take 1 tablet by mouth Daily., Disp: 30 tablet, Rfl: 5  •  metFORMIN (GLUCOPHAGE) 1000 MG tablet, Take 1 tablet by mouth 2 (Two) Times a Day With Meals., Disp: 180 tablet, Rfl: 1  •  oxyCODONE-acetaminophen (PERCOCET)  MG per tablet, Take 0.5-1 tablets by mouth Every 8 (Eight) Hours As Needed for Severe Pain ., Disp: 90 tablet, Rfl: 0  •  pregabalin (LYRICA) 50 MG capsule, Take 1 capsule by mouth 2 (Two) Times a Day. (Patient taking differently: Take 150 mg by mouth 2 (Two) Times a Day.), Disp: 60 capsule, Rfl: 0  •  tamsulosin (FLOMAX) 0.4 MG capsule 24 hr capsule, Take 1 capsule by mouth Daily., Disp: 90 capsule, Rfl: 1  •  tiZANidine (ZANAFLEX) 4 MG tablet, Take 1 tablet by mouth 2 (Two) Times a Day As Needed for Muscle Spasms., Disp: 60 tablet, Rfl: 1    Allergies   Allergen Reactions   • Fexofenadine-Pseudoephed Er Other (See Comments)     TACHYCARDIA       Social History     Socioeconomic History   • Marital status:      Spouse name: Not on file   • Number of children: Not on file   • Years of education: Not on file   • Highest education level: Not on file   Tobacco Use   • Smoking status: Former Smoker     Packs/day: 1.00     Types: Pipe, Cigarettes   • Smokeless tobacco: Former User     Types: Chew   • Tobacco comment: quit Jan 2020   Substance and Sexual Activity   • Alcohol use: Yes     Comment: bimonthly   • Drug use: No   • Sexual activity: Defer       Family History   Problem Relation Age of Onset   • Stroke Mother    • Hypertension  Mother    • Alzheimer's disease Father    • Dementia Father    • Hypertension Father    • Alcohol abuse Sister    • Hypertension Sister    • No Known Problems Brother    • Malig Hyperthermia Neg Hx    • Colon cancer Neg Hx    • Colon polyps Neg Hx            Objective     Vitals:    01/14/21 0801   BP: 140/78         01/14/21  0801   Weight: 109 kg (241 lb)     Body mass index is 33.61 kg/m².    Physical Exam  Constitutional:       Appearance: Normal appearance. He is obese.   Cardiovascular:      Rate and Rhythm: Normal rate and regular rhythm.      Heart sounds: Normal heart sounds.   Pulmonary:      Effort: Pulmonary effort is normal.      Breath sounds: Normal breath sounds.   Abdominal:      Palpations: Abdomen is soft.      Tenderness: There is no abdominal tenderness.      Comments: Obese   Musculoskeletal:      Comments: Antalgic gait, walks with a cane   Neurological:      Mental Status: He is alert and oriented to person, place, and time.   Psychiatric:         Mood and Affect: Mood normal.         Behavior: Behavior normal.         Thought Content: Thought content normal.         Judgment: Judgment normal.         Result Review :         WBC   Date Value Ref Range Status   07/30/2020 9.8 3.4 - 10.8 x10E3/uL Final     RBC   Date Value Ref Range Status   07/30/2020 5.12 4.14 - 5.80 x10E6/uL Final     Hemoglobin   Date Value Ref Range Status   07/30/2020 15.1 13.0 - 17.7 g/dL Final   04/13/2018 13.6 (L) 13.7 - 17.6 g/dL Final     Hematocrit   Date Value Ref Range Status   07/30/2020 45.6 37.5 - 51.0 % Final   04/13/2018 41.9 40.4 - 52.2 % Final     MCV   Date Value Ref Range Status   07/30/2020 89 79 - 97 fL Final   04/13/2018 91.3 79.8 - 96.2 fL Final     MCH   Date Value Ref Range Status   07/30/2020 29.5 26.6 - 33.0 pg Final   04/13/2018 29.6 27.0 - 32.7 pg Final     MCHC   Date Value Ref Range Status   07/30/2020 33.1 31.5 - 35.7 g/dL Final   04/13/2018 32.5 (L) 32.6 - 36.4 g/dL Final     RDW   Date  Value Ref Range Status   07/30/2020 13.6 11.6 - 15.4 % Final   04/13/2018 14.6 (H) 11.5 - 14.5 % Final     RDW-SD   Date Value Ref Range Status   04/13/2018 49.0 37.0 - 54.0 fl Final     MPV   Date Value Ref Range Status   04/13/2018 8.8 6.0 - 12.0 fL Final     Platelets   Date Value Ref Range Status   07/30/2020 257 150 - 450 x10E3/uL Final   04/13/2018 203 140 - 500 10*3/mm3 Final     Neutrophil Rel %   Date Value Ref Range Status   07/30/2020 61 Not Estab. % Final     Neutrophil %   Date Value Ref Range Status   04/13/2018 53.5 42.7 - 76.0 % Final     Lymphocyte Rel %   Date Value Ref Range Status   07/30/2020 28 Not Estab. % Final     Lymphocyte %   Date Value Ref Range Status   04/13/2018 27.6 19.6 - 45.3 % Final     Monocyte Rel %   Date Value Ref Range Status   07/30/2020 7 Not Estab. % Final     Monocyte %   Date Value Ref Range Status   04/13/2018 13.7 (H) 5.0 - 12.0 % Final     Eosinophil Rel %   Date Value Ref Range Status   07/30/2020 4 Not Estab. % Final     Eosinophil %   Date Value Ref Range Status   04/13/2018 5.0 0.3 - 6.2 % Final     Basophil Rel %   Date Value Ref Range Status   07/30/2020 0 Not Estab. % Final     Basophil %   Date Value Ref Range Status   04/13/2018 0.2 0.0 - 1.5 % Final     Immature Grans %   Date Value Ref Range Status   04/13/2018 0.0 0.0 - 0.5 % Final     Neutrophils Absolute   Date Value Ref Range Status   07/30/2020 5.8 1.4 - 7.0 x10E3/uL Final     Neutrophils, Absolute   Date Value Ref Range Status   04/13/2018 3.08 1.90 - 8.10 10*3/mm3 Final     Lymphocytes Absolute   Date Value Ref Range Status   07/30/2020 2.7 0.7 - 3.1 x10E3/uL Final     Lymphocytes, Absolute   Date Value Ref Range Status   04/13/2018 1.59 0.90 - 4.80 10*3/mm3 Final     Monocytes Absolute   Date Value Ref Range Status   07/30/2020 0.7 0.1 - 0.9 x10E3/uL Final     Monocytes, Absolute   Date Value Ref Range Status   04/13/2018 0.79 0.20 - 1.20 10*3/mm3 Final     Eosinophils Absolute   Date Value Ref  Range Status   07/30/2020 0.4 0.0 - 0.4 x10E3/uL Final     Eosinophils, Absolute   Date Value Ref Range Status   04/13/2018 0.29 0.00 - 0.70 10*3/mm3 Final     Basophils Absolute   Date Value Ref Range Status   07/30/2020 0.0 0.0 - 0.2 x10E3/uL Final     Basophils, Absolute   Date Value Ref Range Status   04/13/2018 0.01 0.00 - 0.20 10*3/mm3 Final     Immature Grans, Absolute   Date Value Ref Range Status   04/13/2018 0.00 0.00 - 0.03 10*3/mm3 Final     nRBC   Date Value Ref Range Status   10/22/2019 0.0 0.0 - 0.2 /100 WBC Final   02/15/2017 0.0 0.0 - 0.0 /100 WBC Final       Lab Results   Component Value Date    GLUCOSE 134 (H) 04/13/2018    BUN 14 07/30/2020    CREATININE 0.79 07/30/2020    EGFRIFNONA 102 07/30/2020    EGFRIFAFRI 118 07/30/2020    BCR 18 07/30/2020    CO2 26 07/30/2020    CALCIUM 9.8 07/30/2020    PROTENTOTREF 7.2 07/30/2020    ALBUMIN 4.9 07/30/2020    LABIL2 2.1 07/30/2020    AST 22 07/30/2020    ALT 24 07/30/2020         CBC, CMP reviewed as above.  I also reviewed Dr. Zambrano's July 2020 office note.      Imaging Results (Last 7 Days)     ** No results found for the last 168 hours. **            No notes on file    Assessment/Plan    1.  Rectal bleeding: Intermittent issues, description consistent with anal outlet issue but he has never had a colonoscopy    2.  Opioid associated constipation: Longstanding issue.  He uses Dulcolax    3.  Colon cancer screening: Average risk.    Plan  We will proceed with colonoscopy for screening, further evaluation of his rectal bleeding  Discussed options for treating constipation.  He is not interested in trying any prescription medicine, even as a sample at this time.  He is comfortable with his current regimen  Further recommendations after colonoscopy.  Discussed the procedure, bowel prep, Covid testing beforehand.    Diagnoses and all orders for this visit:    1. Rectal bleeding (Primary)  -     Case Request; Standing  -     Follow Anesthesia Guidelines /  Standing Orders; Future  -     Obtain Informed Consent; Future  -     Implement Anesthesia Orders Day of Procedure; Standing  -     Obtain Informed Consent; Standing  -     Verify bowel prep was successful; Standing  -     lactated ringers infusion  -     Case Request    2. Drug-induced constipation  -     Case Request; Standing  -     Follow Anesthesia Guidelines / Standing Orders; Future  -     Obtain Informed Consent; Future  -     Implement Anesthesia Orders Day of Procedure; Standing  -     Obtain Informed Consent; Standing  -     Verify bowel prep was successful; Standing  -     lactated ringers infusion  -     Case Request    3. Colon cancer screening  -     Case Request; Standing  -     Follow Anesthesia Guidelines / Standing Orders; Future  -     Obtain Informed Consent; Future  -     Implement Anesthesia Orders Day of Procedure; Standing  -     Obtain Informed Consent; Standing  -     Verify bowel prep was successful; Standing  -     lactated ringers infusion  -     Case Request        I have discussed the above plan with the patient.  They verbalize understanding and are in agreement with the plan.  They have been advised to contact the office for any questions, concerns, or changes related to their health.    Dictated utilizing Dragon dictation

## 2021-01-18 ENCOUNTER — APPOINTMENT (OUTPATIENT)
Dept: GENERAL RADIOLOGY | Facility: HOSPITAL | Age: 56
End: 2021-01-18

## 2021-01-18 ENCOUNTER — HOSPITAL ENCOUNTER (EMERGENCY)
Facility: HOSPITAL | Age: 56
Discharge: HOME | End: 2021-01-18
Attending: EMERGENCY MEDICINE | Admitting: EMERGENCY MEDICINE

## 2021-01-18 VITALS
WEIGHT: 240 LBS | TEMPERATURE: 97.1 F | BODY MASS INDEX: 33.6 KG/M2 | DIASTOLIC BLOOD PRESSURE: 92 MMHG | HEIGHT: 71 IN | HEART RATE: 74 BPM | OXYGEN SATURATION: 98 % | SYSTOLIC BLOOD PRESSURE: 163 MMHG | RESPIRATION RATE: 18 BRPM

## 2021-01-18 DIAGNOSIS — S46.911A MUSCLE STRAIN OF RIGHT SHOULDER REGION, INITIAL ENCOUNTER: Primary | ICD-10-CM

## 2021-01-18 PROCEDURE — 99283 EMERGENCY DEPT VISIT LOW MDM: CPT | Performed by: EMERGENCY MEDICINE

## 2021-01-18 PROCEDURE — 73030 X-RAY EXAM OF SHOULDER: CPT

## 2021-01-18 PROCEDURE — 73000 X-RAY EXAM OF COLLAR BONE: CPT

## 2021-01-18 PROCEDURE — 99283 EMERGENCY DEPT VISIT LOW MDM: CPT

## 2021-01-19 NOTE — ED PROVIDER NOTES
Subjective     History provided by:  Patient    History of Present Illness    · Chief complaint: Right clavicle and right shoulder pain    · Location: Swelling and pain in the mid right clavicle.  Pain in the right shoulder humeral head.    · Quality/Severity: Pain is severe.    · Timing/Onset: Occurred 4 days ago.    · Modifying Factors: Turning his neck and moving his right shoulder exacerbates pain.    · Associated symptoms: Denies any sensorimotor loss in the right upper extremity.    · Narrative: The patient is a 55-year-old white male who states last Thursday morning (4 days ago) he used his right hand to push his body up off the bed and felt a pop in his right clavicle area.  He has had pain and swelling in his mid right clavicle since that radiates to his right shoulder.  Turning his neck and range of motion of his right shoulder exacerbates pain in the right clavicle area as well as in the right shoulder.  He states he has had 47 orthopedic surgeries.  He is in pain management with Dr. Tim Roman.    Review of Systems   Constitutional: Negative for chills and fever.   HENT: Negative for congestion, ear pain, rhinorrhea, sinus pain and sore throat.    Eyes: Negative for pain and visual disturbance.   Respiratory: Negative for cough and shortness of breath.    Cardiovascular: Negative for chest pain.   Gastrointestinal: Negative for abdominal pain, diarrhea, nausea and vomiting.   Genitourinary: Negative for dysuria.   Musculoskeletal: Positive for back pain ( Chronic) and neck pain. Negative for neck stiffness.   Skin: Negative for color change and rash.   Neurological: Negative for dizziness, weakness, numbness and headaches.   Psychiatric/Behavioral: Negative for confusion.     Past Medical History:   Diagnosis Date   • Acute nasopharyngitis (common cold)     ON ANTIBX, THICK PRODUCTIVE COUGH   • Anesthesia complication     WAKES VIOLENTLY.  UNKNOWN REASON   • Arthrofibrosis of knee joint, left    • DDD  "(degenerative disc disease), cervical    • Diabetes mellitus (CMS/HCC)    • DJD (degenerative joint disease)    • Gout    • History of kidney stones    • Hyperlipidemia    • Hypertension    • Left knee pain    • Migraines    • Prostate cancer (CMS/HCC) 01/2017    SEED IMPLANTS   • Sleep apnea     NO MACHINE   • Sleep disorder      /92 (BP Location: Left arm, Patient Position: Sitting)   Pulse 74   Temp 97.1 °F (36.2 °C) (Temporal)   Resp 18   Ht 180.3 cm (71\")   Wt 109 kg (240 lb)   SpO2 98%   BMI 33.47 kg/m²     Past Medical History:   Diagnosis Date   • Acute nasopharyngitis (common cold)     ON ANTIBX, THICK PRODUCTIVE COUGH   • Anesthesia complication     WAKES VIOLENTLY.  UNKNOWN REASON   • Arthrofibrosis of knee joint, left    • DDD (degenerative disc disease), cervical    • Diabetes mellitus (CMS/HCC)    • DJD (degenerative joint disease)    • Gout    • History of kidney stones    • Hyperlipidemia    • Hypertension    • Left knee pain    • Migraines    • Prostate cancer (CMS/HCC) 01/2017    SEED IMPLANTS   • Sleep apnea     NO MACHINE   • Sleep disorder        Allergies   Allergen Reactions   • Fexofenadine-Pseudoephed Er Other (See Comments)     TACHYCARDIA       Past Surgical History:   Procedure Laterality Date   • ABDOMINAL HERNIA REPAIR      X3   • ADENOIDECTOMY     • ANKLE ARTHROPLASTY Left     Piece of glass removed and cut a tendon    • BACK SURGERY      X4   • CERVICAL FUSION  2015   • CHOLECYSTECTOMY     • ELBOW ARTHROPLASTY Right     Tendon and artery repair from traumatic wound    • INGUINAL HERNIA REPAIR Left     X3   • JOINT MANIPULATION Left 4/16/2018    Procedure: LEFT KNEE MANIPULATION;  Surgeon: Vinod Crooks MD;  Location: [facility];  Service: Orthopedics   • KIDNEY STONE SURGERY      MULITPLE SURGERIES   • KNEE ARTHROPLASTY Left    • KNEE SURGERY Left     X8   • LEG SURGERY      from spider bites   • NECK SURGERY      x 3 (1198, 2003, 20125). Plated and fused   • " PROSTATE RADIOACTIVE SEED IMPLANT  01/2017   • PROSTATE SURGERY     • SHOULDER ARTHROSCOPY Right    • TESTICLE SURGERY Right     Hydrocele repair    • TONSILLECTOMY     • TOTAL KNEE ARTHROPLASTY Left 3/5/2018    Procedure: LEFT TOTAL KNEE ARTHROPLASTY;  Surgeon: Vinod Crooks MD;  Location: [facility] OR;  Service:    • UMBILICAL HERNIA REPAIR         Family History   Problem Relation Age of Onset   • Stroke Mother    • Hypertension Mother    • Alzheimer's disease Father    • Dementia Father    • Hypertension Father    • Alcohol abuse Sister    • Hypertension Sister    • No Known Problems Brother    • Malig Hyperthermia Neg Hx    • Colon cancer Neg Hx    • Colon polyps Neg Hx        Social History     Socioeconomic History   • Marital status:      Spouse name: Not on file   • Number of children: Not on file   • Years of education: Not on file   • Highest education level: Not on file   Tobacco Use   • Smoking status: Former Smoker     Packs/day: 1.00     Types: Pipe, Cigarettes   • Smokeless tobacco: Former User     Types: Chew   • Tobacco comment: quit Jan 2020   Substance and Sexual Activity   • Alcohol use: Yes     Comment: bimonthly   • Drug use: No   • Sexual activity: Defer           Objective   Physical Exam  Vitals signs and nursing note reviewed.   Constitutional:       Appearance: Normal appearance. He is not ill-appearing or toxic-appearing.      Comments: The patient appears in discomfort.  He does not appear toxic or ill.  Review of his vital signs: His blood pressure is elevated 163/92, remainder of vital signs within normal limits.   HENT:      Head: Normocephalic and atraumatic.      Nose: Nose normal.   Eyes:      General: No scleral icterus.     Conjunctiva/sclera: Conjunctivae normal.      Pupils: Pupils are equal, round, and reactive to light.   Neck:      Musculoskeletal: Normal range of motion and neck supple. No muscular tenderness.   Musculoskeletal:      Comments: The patient has  swelling and tenderness over the right mid clavicle.  There is no tenderness or deformity of the right AC joint.  There is soft tissue tenderness to palpation over the right humeral head.  There is no dislocation or deformity of the right shoulder.  Passive and active range of motion of the right shoulder causes pain both in the humeral head area as well as in the mid right clavicle area.   Lymphadenopathy:      Cervical: No cervical adenopathy.   Skin:     General: Skin is warm and dry.      Capillary Refill: Capillary refill takes less than 2 seconds.      Findings: No erythema.   Neurological:      General: No focal deficit present.      Mental Status: He is alert and oriented to person, place, and time.      Cranial Nerves: No cranial nerve deficit.      Sensory: No sensory deficit.      Motor: No weakness.   Psychiatric:         Mood and Affect: Mood normal.         Behavior: Behavior normal.         Thought Content: Thought content normal.         Judgment: Judgment normal.         Procedures           ED Course  ED Course as of Jan 18 2105   Mon Jan 18, 2021 2038 The patient's x-rays of the right shoulder right clavicle show an old healed distal right clavicle fracture at the AC joint.  There is no acute acute osseous abnormality.  Prominent degenerative changes.    [TP]   2103 The patient's right arm was placed in a sling by the tech.  His right hand was neurovascular intact after placement.    [TP]   2103 Patient already has pain medication and muscle relaxers and anti-inflammatories at home.  He will be referred to follow-up with his orthopedist Dr. Crooks at the next available appointment.    [TP]      ED Course User Index  [TP] Dimitris Doherty MD                                           MDM  Number of Diagnoses or Management Options  Muscle strain of right shoulder region, initial encounter: new and requires workup     Amount and/or Complexity of Data Reviewed  Tests in the radiology section of CPT®:  ordered and reviewed    Risk of Complications, Morbidity, and/or Mortality  Presenting problems: moderate  Diagnostic procedures: moderate  Management options: moderate  General comments: My differential includes but is not limited to shoulder contusion, clavicle fracture, scapular fracture, humeral head, neck or shaft fracture, AC separation, shoulder dislocation, shoulder sprain, axillary nerve palsy and myocardial infarction    Patient Progress  Patient progress: stable      Final diagnoses:   Muscle strain of right shoulder region, initial encounter           Labs Reviewed - No data to display  XR Shoulder 2+ View Right   Final Result   No clearly acute finding. Prominent osteoarthritic changes with remote healed fracture of the distal clavicle.      Clinical aspects of the case will determine if MRI of the right shoulder could provide additional information.      Signer Name: Sarkis Regalado MD    Signed: 1/18/2021 8:21 PM    Workstation Name: [facility]     [facility]      XR Clavicle Right   Final Result   No clearly acute findings demonstrated. Evidence of prior healed fracture of the distal clavicle at the right AC joint.      Clinical aspects of the case will determine if MR of the right shoulder could provide additional information.      Signer Name: Sarkis Regalado MD    Signed: 1/18/2021 8:20 PM    Workstation Name: [email or web address]     [facility]             Medication List      Changed    pregabalin 50 MG capsule  Commonly known as: Lyrica  Take 1 capsule by mouth 2 (Two) Times a Day.  What changed: how much to take                 Dimitris Doherty MD  01/18/21 [id number]

## 2021-02-05 ENCOUNTER — TRANSCRIBE ORDERS (OUTPATIENT)
Dept: LAB | Facility: HOSPITAL | Age: 56
End: 2021-02-05

## 2021-02-05 DIAGNOSIS — Z01.818 OTHER SPECIFIED PRE-OPERATIVE EXAMINATION: Primary | ICD-10-CM

## 2021-02-13 ENCOUNTER — LAB (OUTPATIENT)
Dept: LAB | Facility: HOSPITAL | Age: 56
End: 2021-02-13

## 2021-02-13 DIAGNOSIS — Z01.818 OTHER SPECIFIED PRE-OPERATIVE EXAMINATION: ICD-10-CM

## 2021-02-13 LAB — SARS-COV-2 RNA PNL SPEC NAA+PROBE: NOT DETECTED

## 2021-02-13 PROCEDURE — 87635 SARS-COV-2 COVID-19 AMP PRB: CPT | Performed by: OBSTETRICS & GYNECOLOGY

## 2021-02-13 PROCEDURE — C9803 HOPD COVID-19 SPEC COLLECT: HCPCS

## 2021-02-16 ENCOUNTER — HOSPITAL ENCOUNTER (OUTPATIENT)
Facility: HOSPITAL | Age: 56
Setting detail: HOSPITAL OUTPATIENT SURGERY
Discharge: HOME | End: 2021-02-16
Attending: INTERNAL MEDICINE | Admitting: INTERNAL MEDICINE

## 2021-02-16 ENCOUNTER — ANESTHESIA EVENT (OUTPATIENT)
Dept: GASTROENTEROLOGY | Facility: HOSPITAL | Age: 56
End: 2021-02-16

## 2021-02-16 ENCOUNTER — ANESTHESIA (OUTPATIENT)
Dept: GASTROENTEROLOGY | Facility: HOSPITAL | Age: 56
End: 2021-02-16

## 2021-02-16 VITALS
RESPIRATION RATE: 16 BRPM | BODY MASS INDEX: 32.51 KG/M2 | HEIGHT: 72 IN | SYSTOLIC BLOOD PRESSURE: 128 MMHG | OXYGEN SATURATION: 96 % | DIASTOLIC BLOOD PRESSURE: 87 MMHG | HEART RATE: 56 BPM | TEMPERATURE: 97.8 F | WEIGHT: 240 LBS

## 2021-02-16 DIAGNOSIS — K59.03 DRUG-INDUCED CONSTIPATION: ICD-10-CM

## 2021-02-16 DIAGNOSIS — K62.5 RECTAL BLEEDING: ICD-10-CM

## 2021-02-16 DIAGNOSIS — Z12.11 COLON CANCER SCREENING: ICD-10-CM

## 2021-02-16 LAB — GLUCOSE BLDC GLUCOMTR-MCNC: 261 MG/DL (ref 70–130)

## 2021-02-16 PROCEDURE — 88305 TISSUE EXAM BY PATHOLOGIST: CPT | Performed by: INTERNAL MEDICINE

## 2021-02-16 PROCEDURE — S0260 H&P FOR SURGERY: HCPCS | Performed by: INTERNAL MEDICINE

## 2021-02-16 PROCEDURE — 45385 COLONOSCOPY W/LESION REMOVAL: CPT | Performed by: INTERNAL MEDICINE

## 2021-02-16 PROCEDURE — 45380 COLONOSCOPY AND BIOPSY: CPT | Performed by: INTERNAL MEDICINE

## 2021-02-16 PROCEDURE — 82962 GLUCOSE BLOOD TEST: CPT

## 2021-02-16 PROCEDURE — 25010000002 PROPOFOL 10 MG/ML EMULSION: Performed by: REGISTERED NURSE

## 2021-02-16 RX ORDER — PROPOFOL 10 MG/ML
VIAL (ML) INTRAVENOUS AS NEEDED
Status: DISCONTINUED | OUTPATIENT
Start: 2021-02-16 | End: 2021-02-16 | Stop reason: SURG

## 2021-02-16 RX ORDER — SODIUM CHLORIDE, SODIUM LACTATE, POTASSIUM CHLORIDE, CALCIUM CHLORIDE 600; 310; 30; 20 MG/100ML; MG/100ML; MG/100ML; MG/100ML
30 INJECTION, SOLUTION INTRAVENOUS CONTINUOUS
Status: DISCONTINUED | OUTPATIENT
Start: 2021-02-16 | End: 2021-02-16 | Stop reason: HOSPADM

## 2021-02-16 RX ORDER — PROPOFOL 10 MG/ML
VIAL (ML) INTRAVENOUS CONTINUOUS PRN
Status: DISCONTINUED | OUTPATIENT
Start: 2021-02-16 | End: 2021-02-16 | Stop reason: SURG

## 2021-02-16 RX ORDER — LIDOCAINE HYDROCHLORIDE 20 MG/ML
INJECTION, SOLUTION INFILTRATION; PERINEURAL AS NEEDED
Status: DISCONTINUED | OUTPATIENT
Start: 2021-02-16 | End: 2021-02-16 | Stop reason: SURG

## 2021-02-16 RX ADMIN — LIDOCAINE HYDROCHLORIDE 100 MG: 20 INJECTION, SOLUTION INFILTRATION; PERINEURAL at 09:41

## 2021-02-16 RX ADMIN — SODIUM CHLORIDE, POTASSIUM CHLORIDE, SODIUM LACTATE AND CALCIUM CHLORIDE 30 ML/HR: 600; 310; 30; 20 INJECTION, SOLUTION INTRAVENOUS at 09:23

## 2021-02-16 RX ADMIN — PROPOFOL 60 MG: 10 INJECTION, EMULSION INTRAVENOUS at 09:41

## 2021-02-16 RX ADMIN — LIDOCAINE HYDROCHLORIDE 100 MG: 20 INJECTION, SOLUTION INFILTRATION; PERINEURAL at 10:26

## 2021-02-16 RX ADMIN — PROPOFOL 160 MCG/KG/MIN: 10 INJECTION, EMULSION INTRAVENOUS at 09:41

## 2021-02-16 NOTE — ANESTHESIA POSTPROCEDURE EVALUATION
"Patient: Flaco Miles Sr.    Procedure Summary     Date: 02/16/21 Room / Location: [facility] /  [facility]    Anesthesia Start: 0937 Anesthesia Stop: 1031    Procedure: COLONOSCOPY to cecum and TI:  cold biopsy polypectomy, not snare polypectomy, cold snare polypectomies, (N/A ) Diagnosis:       Rectal bleeding      Drug-induced constipation      Colon cancer screening      (Rectal bleeding [K62.5])      (Drug-induced constipation [K59.03])      (Colon cancer screening [Z12.11])    Surgeon: Elenita Kaba MD Provider: Jose Israel MD    Anesthesia Type: MAC ASA Status: 3          Anesthesia Type: MAC    Vitals  Vitals Value Taken Time   /87 02/16/21 1102   Temp 36.6 °C (97.8 °F) 02/16/21 1102   Pulse 56 02/16/21 1102   Resp 16 02/16/21 1102   SpO2 96 % 02/16/21 1102           Post Anesthesia Care and Evaluation    Patient location during evaluation: PACU  Patient participation: complete - patient participated  Level of consciousness: awake  Pain score: 0  Pain management: adequate  Airway patency: patent  Anesthetic complications: No anesthetic complications  PONV Status: none  Cardiovascular status: acceptable  Respiratory status: acceptable  Hydration status: acceptable    Comments: /87 (BP Location: Left arm, Patient Position: Lying)   Pulse 56   Temp 36.6 °C (97.8 °F) (Oral)   Resp 16   Ht 182.9 cm (72\")   Wt 109 kg (240 lb)   SpO2 96%   BMI 32.55 kg/m²       "

## 2021-02-16 NOTE — H&P
"[facility]  Pre Procedure History & Physical    Chief Complaint: Colon cancer screening, chronic constipation, rectal bleeding      HPI: 55 y.o. male with a past medical history noted below who presents for evaluation of constipation, rectal bleeding, and need for colonoscopy.     Patient has been chronic.  This is due to chronic pain medication.  He has back pain and has been on Percocet 4 times daily for about 30 or more years.  He does take Dulcolax to have bowel movements.  Bowel movements vary.  Sometimes he can go months where he is quite normal, then sometimes he has harder stools.  He says it just depends on what he eats.  He does report that at times he has blood in his stool.  It is not with every bowel movement.  He sees bright red blood in the water and with wiping.  Mostly associated with straining.     He denies that he has any heartburn or acid reflux symptoms.  Not taking any NSAIDs.  No issues with nausea or vomiting.  Says that he \"likes to eat.\"     6 hernia repairs, hx of horacio     Former smoker     No excess ETOH     No known family history of GI maligancies     He has never had a colonoscopy    Past Medical History:   Past Medical History:   Diagnosis Date   • Anesthesia complication     WAKES VIOLENTLY.  UNKNOWN REASON   • Arthrofibrosis of knee joint, left    • Constipation    • DDD (degenerative disc disease), cervical    • Diabetes mellitus (CMS/HCC)    • DJD (degenerative joint disease)    • Gout    • History of kidney stones    • Hyperlipidemia    • Hypertension    • Left knee pain    • Migraines    • Prostate cancer (CMS/HCC) 01/2017    SEED IMPLANTS   • Rectal bleeding    • Sleep apnea     NO MACHINE   • Sleep disorder        Family History:  Family History   Problem Relation Age of Onset   • Stroke Mother    • Hypertension Mother    • Alzheimer's disease Father    • Dementia Father    • Hypertension Father    • Alcohol abuse Sister    • Hypertension Sister    • " No Known Problems Brother    • Kaylyn Hyperthermia Neg Hx    • Colon cancer Neg Hx    • Colon polyps Neg Hx        Social History:   reports that he has quit smoking. His smoking use included pipe and cigarettes. He smoked 1.00 pack per day. He has quit using smokeless tobacco.  His smokeless tobacco use included chew. He reports current alcohol use. He reports that he does not use drugs.    Medications:   No medications prior to admission.       Allergies:  Fexofenadine-pseudoephed er    ROS:    Pertinent items are noted in HPI     Objective     There were no vitals taken for this visit.    Physical Exam   Constitutional: Pt is oriented to person, place, and time and well-developed, well-nourished, and in no distress.   HENT:   Mouth/Throat: Oropharynx is clear and moist.   Neck: Normal range of motion. Neck supple.   Cardiovascular: Normal rate, regular rhythm and normal heart sounds.    Pulmonary/Chest: Effort normal and breath sounds normal. No respiratory distress. No  wheezes.   Abdominal: Soft. Bowel sounds are normal.   Skin: Skin is warm and dry.   Psychiatric: Mood, memory, affect and judgment normal.     Assessment/Plan     Diagnosis: Colon cancer screening, chronic constipation, rectal bleeding      Anticipated Surgical Procedure:    Colonoscopy    The risks, benefits, and alternatives of this procedure have been discussed with the patient or the responsible party- the patient understands and agrees to proceed.

## 2021-02-16 NOTE — ANESTHESIA PREPROCEDURE EVALUATION
Anesthesia Evaluation     Patient summary reviewed and Nursing notes reviewed   history of anesthetic complications:  NPO Solid Status: > 8 hours  NPO Liquid Status: > 2 hours           Airway   Mallampati: I  TM distance: >3 FB  Neck ROM: full  No difficulty expected  Dental - normal exam     Pulmonary - normal exam   (+) sleep apnea,   Cardiovascular - normal exam    (+) hypertension, hyperlipidemia,       Neuro/Psych  (+) headaches, dizziness/light headedness, syncope, numbness,     GI/Hepatic/Renal/Endo    (+)  GI bleeding , diabetes mellitus type 2,     Musculoskeletal     Abdominal  - normal exam    Bowel sounds: normal.   Substance History - negative use     OB/GYN negative ob/gyn ROS         Other   arthritis,    history of cancer                  Anesthesia Plan    ASA 3     MAC   (Pt has hx waking violently. Have advised crna.)    Anesthetic plan, all risks, benefits, and alternatives have been provided, discussed and informed consent has been obtained with: patient.    Plan discussed with CRNA.

## 2021-02-17 PROBLEM — D12.6 TUBULOVILLOUS ADENOMA POLYP OF COLON: Status: ACTIVE | Noted: 2021-02-17

## 2021-02-17 LAB
LAB AP CASE REPORT: NORMAL
PATH REPORT.FINAL DX SPEC: NORMAL
PATH REPORT.GROSS SPEC: NORMAL

## 2021-02-18 NOTE — PROGRESS NOTES
Polyps included 1 serrated adenoma, one tubulovillous adenoma and multiple benign hyperplastic polyps.      Please place in 3-year colonoscopy recall    All first-degree relatives aged 40+ (including siblings, children) should have screening colonoscopies based on the high risk polyp that he had (tubulovillous adenoma).

## 2021-02-22 ENCOUNTER — PRE-ADMISSION TESTING (OUTPATIENT)
Dept: PREADMISSION TESTING | Facility: HOSPITAL | Age: 56
End: 2021-02-22

## 2021-02-22 VITALS
HEIGHT: 72 IN | HEART RATE: 87 BPM | SYSTOLIC BLOOD PRESSURE: 133 MMHG | WEIGHT: 241 LBS | DIASTOLIC BLOOD PRESSURE: 78 MMHG | BODY MASS INDEX: 32.64 KG/M2 | OXYGEN SATURATION: 95 % | TEMPERATURE: 98.6 F | RESPIRATION RATE: 16 BRPM

## 2021-02-22 LAB
ALBUMIN SERPL-MCNC: 4 G/DL (ref 3.5–5.2)
ALBUMIN/GLOB SERPL: 1.5 G/DL
ALP SERPL-CCNC: 77 U/L (ref 39–117)
ALT SERPL W P-5'-P-CCNC: 43 U/L (ref 1–41)
ANION GAP SERPL CALCULATED.3IONS-SCNC: 12.8 MMOL/L (ref 5–15)
AST SERPL-CCNC: 26 U/L (ref 1–40)
BASOPHILS # BLD AUTO: 0.03 10*3/MM3 (ref 0–0.2)
BASOPHILS NFR BLD AUTO: 0.4 % (ref 0–1.5)
BILIRUB SERPL-MCNC: 1.1 MG/DL (ref 0–1.2)
BUN SERPL-MCNC: 13 MG/DL (ref 6–20)
BUN/CREAT SERPL: 15.3 (ref 7–25)
CALCIUM SPEC-SCNC: 9.1 MG/DL (ref 8.6–10.5)
CHLORIDE SERPL-SCNC: 100 MMOL/L (ref 98–107)
CO2 SERPL-SCNC: 25.2 MMOL/L (ref 22–29)
CREAT SERPL-MCNC: 0.85 MG/DL (ref 0.76–1.27)
DEPRECATED RDW RBC AUTO: 41 FL (ref 37–54)
EOSINOPHIL # BLD AUTO: 0.4 10*3/MM3 (ref 0–0.4)
EOSINOPHIL NFR BLD AUTO: 4.9 % (ref 0.3–6.2)
ERYTHROCYTE [DISTWIDTH] IN BLOOD BY AUTOMATED COUNT: 13 % (ref 12.3–15.4)
GFR SERPL CREATININE-BSD FRML MDRD: 94 ML/MIN/1.73
GLOBULIN UR ELPH-MCNC: 2.7 GM/DL
GLUCOSE SERPL-MCNC: 401 MG/DL (ref 65–99)
HCT VFR BLD AUTO: 44.3 % (ref 37.5–51)
HGB BLD-MCNC: 14.8 G/DL (ref 13–17.7)
IMM GRANULOCYTES # BLD AUTO: 0.04 10*3/MM3 (ref 0–0.05)
IMM GRANULOCYTES NFR BLD AUTO: 0.5 % (ref 0–0.5)
LYMPHOCYTES # BLD AUTO: 2.26 10*3/MM3 (ref 0.7–3.1)
LYMPHOCYTES NFR BLD AUTO: 27.5 % (ref 19.6–45.3)
MCH RBC QN AUTO: 29.2 PG (ref 26.6–33)
MCHC RBC AUTO-ENTMCNC: 33.4 G/DL (ref 31.5–35.7)
MCV RBC AUTO: 87.4 FL (ref 79–97)
MONOCYTES # BLD AUTO: 0.54 10*3/MM3 (ref 0.1–0.9)
MONOCYTES NFR BLD AUTO: 6.6 % (ref 5–12)
NEUTROPHILS NFR BLD AUTO: 4.94 10*3/MM3 (ref 1.7–7)
NEUTROPHILS NFR BLD AUTO: 60.1 % (ref 42.7–76)
NRBC BLD AUTO-RTO: 0 /100 WBC (ref 0–0.2)
PLATELET # BLD AUTO: 223 10*3/MM3 (ref 140–450)
PMV BLD AUTO: 9.6 FL (ref 6–12)
POTASSIUM SERPL-SCNC: 4 MMOL/L (ref 3.5–5.2)
PROT SERPL-MCNC: 6.7 G/DL (ref 6–8.5)
RBC # BLD AUTO: 5.07 10*6/MM3 (ref 4.14–5.8)
SODIUM SERPL-SCNC: 138 MMOL/L (ref 136–145)
WBC # BLD AUTO: 8.21 10*3/MM3 (ref 3.4–10.8)

## 2021-02-22 PROCEDURE — 93010 ELECTROCARDIOGRAM REPORT: CPT | Performed by: INTERNAL MEDICINE

## 2021-02-22 PROCEDURE — 93005 ELECTROCARDIOGRAM TRACING: CPT

## 2021-02-22 PROCEDURE — 80053 COMPREHEN METABOLIC PANEL: CPT

## 2021-02-22 PROCEDURE — C9803 HOPD COVID-19 SPEC COLLECT: HCPCS | Performed by: NURSE PRACTITIONER

## 2021-02-22 PROCEDURE — 36415 COLL VENOUS BLD VENIPUNCTURE: CPT

## 2021-02-22 PROCEDURE — 85025 COMPLETE CBC W/AUTO DIFF WBC: CPT

## 2021-02-22 PROCEDURE — U0004 COV-19 TEST NON-CDC HGH THRU: HCPCS | Performed by: NURSE PRACTITIONER

## 2021-02-22 RX ORDER — PREGABALIN 150 MG/1
150 CAPSULE ORAL DAILY
COMMUNITY

## 2021-02-22 NOTE — DISCHARGE INSTRUCTIONS
Take the following medications the morning of surgery:    FEDERICO      MAY TAKE ZANAFLEX IF NEEDED      If you are on prescription narcotic pain medication to control your pain you may also take that medication the morning of surgery.    General Instructions:  • Do not eat solid food after midnight the night before surgery.  • You may drink clear liquids day of surgery but must stop at least one hour before your hospital arrival time.  • It is beneficial for you to have a clear drink that contains carbohydrates the day of surgery.  We suggest  a 12 to 20 ounce bottle of G2 or Powerade Zero for diabetic patients.     Clear liquids are liquids you can see through.  Nothing red in color.     Plain water                               Sports drinks  Sodas                                   Gelatin (Jell-O)  Fruit juices without pulp such as white grape juice and apple juice  Popsicles that contain no fruit or yogurt  Tea or coffee (no cream or milk added)  Gatorade / Powerade  G2 / Powerade Zero     • Patients who avoid smoking, chewing tobacco and alcohol for 4 weeks prior to surgery have a reduced risk of post-operative complications.  Quit smoking as many days before surgery as you can.  • Do not smoke, use chewing tobacco or drink alcohol the day of surgery.   • Bring any papers given to you in the doctor’s office.  • Wear clean comfortable clothes.  • Do not wear contact lenses, false eyelashes or make-up.  Bring a case for your glasses.   • Remove all piercings.  Leave jewelry and any other valuables at home.  • The Pre-Admission Testing nurse will instruct you to bring medications if unable to obtain an accurate list in Pre-Admission Testing.  • REPORT TO MAIN AND THEN OVER TO OUTPATIENT SURGERY ON 2- AT 1100 AM (PER PATIENT )          Preventing a Surgical Site Infection:  • For 2 to 3 days before surgery, avoid shaving with a razor because the razor can irritate skin and make it easier to develop an  infection.    • Any areas of open skin can increase the risk of a post-operative wound infection by allowing bacteria to enter and travel throughout the body.  Notify your surgeon if you have any skin wounds / rashes even if it is not near the expected surgical site.  The area will need assessed to determine if surgery should be delayed until it is healed.  • The night prior to surgery shower using a fresh bar of anti-bacterial soap (such as Dial) and clean washcloth.  Sleep in a clean bed with clean clothing.  Do not allow pets to sleep with you.  • Shower on the morning of surgery using a fresh bar of anti-bacterial soap (such as Dial) and clean washcloth.  Dry with a clean towel and dress in clean clothing.  • Ask your surgeon if you will be receiving antibiotics prior to surgery.  • Make sure you, your family, and all healthcare providers clean their hands with soap and water or an alcohol based hand  before caring for you or your wound.    Day of surgery:  Your arrival time is approximately two hours before your scheduled surgery time.  Upon arrival, a Pre-op nurse and Anesthesiologist will review your health history, obtain vital signs, and answer questions you may have.  The only belongings needed at this time will be a list of your home medications and if applicable your C-PAP/BI-PAP machine.  A Pre-op nurse will start an IV and you may receive medication in preparation for surgery, including something to help you relax.     Please be aware that surgery does come with discomfort.  We want to make every effort to control your discomfort so please discuss any uncontrolled symptoms with your nurse.   Your doctor will most likely have prescribed pain medications.      If you are going home after surgery you will receive individualized written care instructions before being discharged.  A responsible adult must drive you to and from the hospital on the day of your surgery and stay with you for 24 hours.   Discharge prescriptions can be filled by the hospital pharmacy during regular pharmacy hours.  If you are having surgery late in the day/evening your prescription may be e-prescribed to your pharmacy.  Please verify your pharmacy hours or chose a 24 hour pharmacy to avoid not having access to your prescription because your pharmacy has closed for the day.         If you have any questions please call Pre-Admission Testing at [phone].  Deductibles and co-payments are collected on the day of service. Please be prepared to pay the required co-pay, deductible or deposit on the day of service as defined by your plan.    Patient Education for Self-Quarantine Process    Following your COVID testing, we strongly recommend that you do not leave your home after you have been tested for COVID except to get medical care. This includes not going to work, school or to public areas.  If this is not possible for you to do please limit your activities to only required outings.  Be sure to wear a mask when you are with other people, practice social distancing and wash your hands frequently.      The following items provide additional details to keep you safe.  • Wash your hands with soap and water frequently for at least 20 seconds.   • Avoid touching your eyes, nose and mouth with unwashed hands.  • Do not share anything - utensils, towels, food from the same bowl.   • Have your own utensils, drinking glass, dishes, towels and bedding.   • Do not have visitors.   • Do use FaceTime to stay in touch with family and friends.  • You should stay in a specific room away from others if possible.   • Stay at least 6 feet away from others in the home if you cannot have a dedicated room to yourself.   • Do not snuggle with your pet. While the CDC says there is no evidence that pets can spread COVID-19 or be infected from humans, it is probably best to avoid “petting, snuggling, being kissed or licked and sharing food (during  self-quarantine)”, according to the CDC.   • Sanitize household surfaces daily. Include all high touch areas (door handles, light switches, phones, countertops, etc.)  • Do not share a bathroom with others, if possible.   • Wear a mask around others in your home if you are unable to stay in a separate room or 6 feet apart. If  you are unable to wear a mask, have your family member wear a mask if they must be within 6 feet of you.   Call your surgeon immediately if you experience any of the following symptoms:  • Sore Throat  • Shortness of Breath or difficulty breathing  • Cough  • Chills  • Body soreness or muscle pain  • Headache  • Fever  • New loss of taste or smell  • Do not arrive for your surgery ill.  Your procedure will need to be rescheduled to another time.  You will need to call your physician before the day of surgery to avoid any unnecessary exposure to hospital staff as well as other patients.

## 2021-02-23 ENCOUNTER — TELEPHONE (OUTPATIENT)
Dept: GASTROENTEROLOGY | Facility: CLINIC | Age: 56
End: 2021-02-23

## 2021-02-23 LAB
QT INTERVAL: 398 MS
SARS-COV-2 ORF1AB RESP QL NAA+PROBE: NOT DETECTED

## 2021-02-23 NOTE — TELEPHONE ENCOUNTER
----- Message from Elenita Kaba MD sent at 2/18/2021  9:46 AM EST -----  Polyps included 1 serrated adenoma, one tubulovillous adenoma and multiple benign hyperplastic polyps.      Please place in 3-year colonoscopy recall    All first-degree relatives aged 40+ (including siblings, children) should have screening colonoscopies based on the high risk polyp that he had (tubulovillous adenoma).

## 2021-02-23 NOTE — TELEPHONE ENCOUNTER
Returned call to pt and advised of Dr Kaba note. Pt verb understanding and per pt request results forwarded to pt's pcp Dr Zambrano thru [facility].

## 2021-02-24 ENCOUNTER — ANESTHESIA EVENT (OUTPATIENT)
Dept: PERIOP | Facility: HOSPITAL | Age: 56
End: 2021-02-24

## 2021-02-24 ENCOUNTER — ANESTHESIA (OUTPATIENT)
Dept: PERIOP | Facility: HOSPITAL | Age: 56
End: 2021-02-24

## 2021-02-24 ENCOUNTER — HOSPITAL ENCOUNTER (OUTPATIENT)
Facility: HOSPITAL | Age: 56
Setting detail: HOSPITAL OUTPATIENT SURGERY
Discharge: HOME | End: 2021-02-24
Attending: ORTHOPAEDIC SURGERY | Admitting: ORTHOPAEDIC SURGERY

## 2021-02-24 VITALS
RESPIRATION RATE: 18 BRPM | OXYGEN SATURATION: 94 % | TEMPERATURE: 99.3 F | SYSTOLIC BLOOD PRESSURE: 113 MMHG | HEART RATE: 73 BPM | DIASTOLIC BLOOD PRESSURE: 58 MMHG

## 2021-02-24 LAB — GLUCOSE BLDC GLUCOMTR-MCNC: 215 MG/DL (ref 70–130)

## 2021-02-24 PROCEDURE — 25010000002 ROPIVACAINE PER 1 MG: Performed by: ANESTHESIOLOGY

## 2021-02-24 PROCEDURE — 82962 GLUCOSE BLOOD TEST: CPT

## 2021-02-24 PROCEDURE — 76942 ECHO GUIDE FOR BIOPSY: CPT | Performed by: ORTHOPAEDIC SURGERY

## 2021-02-24 PROCEDURE — 25010000002 SUCCINYLCHOLINE PER 20 MG: Performed by: NURSE ANESTHETIST, CERTIFIED REGISTERED

## 2021-02-24 PROCEDURE — 25010000002 PHENYLEPHRINE PER 1 ML: Performed by: NURSE ANESTHETIST, CERTIFIED REGISTERED

## 2021-02-24 PROCEDURE — 25010000002 ONDANSETRON PER 1 MG: Performed by: ANESTHESIOLOGY

## 2021-02-24 PROCEDURE — 25010000002 PROPOFOL 10 MG/ML EMULSION: Performed by: NURSE ANESTHETIST, CERTIFIED REGISTERED

## 2021-02-24 PROCEDURE — 25010000002 NEOSTIGMINE 5 MG/10ML SOLUTION: Performed by: ANESTHESIOLOGY

## 2021-02-24 PROCEDURE — C1713 ANCHOR/SCREW BN/BN,TIS/BN: HCPCS | Performed by: ORTHOPAEDIC SURGERY

## 2021-02-24 PROCEDURE — 25010000003 CEFAZOLIN IN DEXTROSE 2-4 GM/100ML-% SOLUTION: Performed by: ORTHOPAEDIC SURGERY

## 2021-02-24 PROCEDURE — 25010000002 EPINEPHRINE PER 0.1 MG: Performed by: ORTHOPAEDIC SURGERY

## 2021-02-24 PROCEDURE — 25010000002 MIDAZOLAM PER 1 MG: Performed by: ANESTHESIOLOGY

## 2021-02-24 PROCEDURE — 25010000002 FENTANYL CITRATE (PF) 100 MCG/2ML SOLUTION: Performed by: ANESTHESIOLOGY

## 2021-02-24 PROCEDURE — C1889 IMPLANT/INSERT DEVICE, NOC: HCPCS | Performed by: ORTHOPAEDIC SURGERY

## 2021-02-24 DEVICE — KNOTLESS TISSUE CONTROL DEVICE, UNDYED UNIDIRECTIONAL (ANTIBACTERIAL) SYNTHETIC ABSORBABLE DEVICE
Type: IMPLANTABLE DEVICE | Site: SHOULDER | Status: FUNCTIONAL
Brand: STRATAFIX

## 2021-02-24 DEVICE — IMPLANTABLE DEVICE
Type: IMPLANTABLE DEVICE | Site: SHOULDER | Status: FUNCTIONAL
Brand: JUGGERKNOT SOFT ANCHORS

## 2021-02-24 RX ORDER — PROMETHAZINE HYDROCHLORIDE 25 MG/1
25 SUPPOSITORY RECTAL ONCE AS NEEDED
Status: DISCONTINUED | OUTPATIENT
Start: 2021-02-24 | End: 2021-02-24 | Stop reason: HOSPADM

## 2021-02-24 RX ORDER — HYDRALAZINE HYDROCHLORIDE 20 MG/ML
5 INJECTION INTRAMUSCULAR; INTRAVENOUS
Status: DISCONTINUED | OUTPATIENT
Start: 2021-02-24 | End: 2021-02-24 | Stop reason: HOSPADM

## 2021-02-24 RX ORDER — LIDOCAINE HYDROCHLORIDE 10 MG/ML
0.5 INJECTION, SOLUTION EPIDURAL; INFILTRATION; INTRACAUDAL; PERINEURAL ONCE AS NEEDED
Status: DISCONTINUED | OUTPATIENT
Start: 2021-02-24 | End: 2021-02-24 | Stop reason: HOSPADM

## 2021-02-24 RX ORDER — NALOXONE HCL 0.4 MG/ML
0.2 VIAL (ML) INJECTION AS NEEDED
Status: DISCONTINUED | OUTPATIENT
Start: 2021-02-24 | End: 2021-02-24 | Stop reason: HOSPADM

## 2021-02-24 RX ORDER — DIPHENHYDRAMINE HCL 25 MG
25 CAPSULE ORAL
Status: DISCONTINUED | OUTPATIENT
Start: 2021-02-24 | End: 2021-02-24 | Stop reason: HOSPADM

## 2021-02-24 RX ORDER — EPHEDRINE SULFATE 50 MG/ML
5 INJECTION, SOLUTION INTRAVENOUS ONCE AS NEEDED
Status: DISCONTINUED | OUTPATIENT
Start: 2021-02-24 | End: 2021-02-24 | Stop reason: HOSPADM

## 2021-02-24 RX ORDER — LIDOCAINE HYDROCHLORIDE 20 MG/ML
INJECTION, SOLUTION INFILTRATION; PERINEURAL AS NEEDED
Status: DISCONTINUED | OUTPATIENT
Start: 2021-02-24 | End: 2021-02-24 | Stop reason: SURG

## 2021-02-24 RX ORDER — NEOSTIGMINE METHYLSULFATE 0.5 MG/ML
INJECTION, SOLUTION INTRAVENOUS AS NEEDED
Status: DISCONTINUED | OUTPATIENT
Start: 2021-02-24 | End: 2021-02-24 | Stop reason: SURG

## 2021-02-24 RX ORDER — OXYCODONE HYDROCHLORIDE AND ACETAMINOPHEN 5; 325 MG/1; MG/1
2 TABLET ORAL EVERY 4 HOURS PRN
Qty: 40 TABLET | Refills: 0 | Status: SHIPPED | OUTPATIENT
Start: 2021-02-24 | End: 2021-08-26

## 2021-02-24 RX ORDER — GLYCOPYRROLATE 0.2 MG/ML
INJECTION INTRAMUSCULAR; INTRAVENOUS AS NEEDED
Status: DISCONTINUED | OUTPATIENT
Start: 2021-02-24 | End: 2021-02-24 | Stop reason: SURG

## 2021-02-24 RX ORDER — ROCURONIUM BROMIDE 10 MG/ML
INJECTION, SOLUTION INTRAVENOUS AS NEEDED
Status: DISCONTINUED | OUTPATIENT
Start: 2021-02-24 | End: 2021-02-24 | Stop reason: SURG

## 2021-02-24 RX ORDER — MIDAZOLAM HYDROCHLORIDE 1 MG/ML
1 INJECTION INTRAMUSCULAR; INTRAVENOUS
Status: DISCONTINUED | OUTPATIENT
Start: 2021-02-24 | End: 2021-02-24 | Stop reason: HOSPADM

## 2021-02-24 RX ORDER — SODIUM CHLORIDE 0.9 % (FLUSH) 0.9 %
3-10 SYRINGE (ML) INJECTION AS NEEDED
Status: DISCONTINUED | OUTPATIENT
Start: 2021-02-24 | End: 2021-02-24 | Stop reason: HOSPADM

## 2021-02-24 RX ORDER — CEFAZOLIN SODIUM 2 G/100ML
2 INJECTION, SOLUTION INTRAVENOUS ONCE
Status: COMPLETED | OUTPATIENT
Start: 2021-02-24 | End: 2021-02-24

## 2021-02-24 RX ORDER — ROPIVACAINE HYDROCHLORIDE 5 MG/ML
INJECTION, SOLUTION EPIDURAL; INFILTRATION; PERINEURAL
Status: COMPLETED | OUTPATIENT
Start: 2021-02-24 | End: 2021-02-24

## 2021-02-24 RX ORDER — DIPHENHYDRAMINE HYDROCHLORIDE 50 MG/ML
12.5 INJECTION INTRAMUSCULAR; INTRAVENOUS
Status: DISCONTINUED | OUTPATIENT
Start: 2021-02-24 | End: 2021-02-24 | Stop reason: HOSPADM

## 2021-02-24 RX ORDER — PROMETHAZINE HYDROCHLORIDE 25 MG/1
25 TABLET ORAL ONCE AS NEEDED
Status: DISCONTINUED | OUTPATIENT
Start: 2021-02-24 | End: 2021-02-24 | Stop reason: HOSPADM

## 2021-02-24 RX ORDER — FAMOTIDINE 10 MG/ML
20 INJECTION, SOLUTION INTRAVENOUS ONCE
Status: COMPLETED | OUTPATIENT
Start: 2021-02-24 | End: 2021-02-24

## 2021-02-24 RX ORDER — FLUMAZENIL 0.1 MG/ML
0.2 INJECTION INTRAVENOUS AS NEEDED
Status: DISCONTINUED | OUTPATIENT
Start: 2021-02-24 | End: 2021-02-24 | Stop reason: HOSPADM

## 2021-02-24 RX ORDER — LABETALOL HYDROCHLORIDE 5 MG/ML
5 INJECTION, SOLUTION INTRAVENOUS
Status: DISCONTINUED | OUTPATIENT
Start: 2021-02-24 | End: 2021-02-24 | Stop reason: HOSPADM

## 2021-02-24 RX ORDER — DEXMEDETOMIDINE HYDROCHLORIDE 100 UG/ML
INJECTION, SOLUTION INTRAVENOUS AS NEEDED
Status: DISCONTINUED | OUTPATIENT
Start: 2021-02-24 | End: 2021-02-24 | Stop reason: SURG

## 2021-02-24 RX ORDER — SODIUM CHLORIDE, SODIUM LACTATE, POTASSIUM CHLORIDE, CALCIUM CHLORIDE 600; 310; 30; 20 MG/100ML; MG/100ML; MG/100ML; MG/100ML
9 INJECTION, SOLUTION INTRAVENOUS CONTINUOUS
Status: DISCONTINUED | OUTPATIENT
Start: 2021-02-24 | End: 2021-02-24 | Stop reason: HOSPADM

## 2021-02-24 RX ORDER — FENTANYL CITRATE 50 UG/ML
50 INJECTION, SOLUTION INTRAMUSCULAR; INTRAVENOUS
Status: COMPLETED | OUTPATIENT
Start: 2021-02-24 | End: 2021-02-24

## 2021-02-24 RX ORDER — SUCCINYLCHOLINE CHLORIDE 20 MG/ML
INJECTION INTRAMUSCULAR; INTRAVENOUS AS NEEDED
Status: DISCONTINUED | OUTPATIENT
Start: 2021-02-24 | End: 2021-02-24 | Stop reason: SURG

## 2021-02-24 RX ORDER — EPHEDRINE SULFATE 50 MG/ML
INJECTION, SOLUTION INTRAVENOUS AS NEEDED
Status: DISCONTINUED | OUTPATIENT
Start: 2021-02-24 | End: 2021-02-24 | Stop reason: SURG

## 2021-02-24 RX ORDER — FENTANYL CITRATE 50 UG/ML
50 INJECTION, SOLUTION INTRAMUSCULAR; INTRAVENOUS
Status: DISCONTINUED | OUTPATIENT
Start: 2021-02-24 | End: 2021-02-24 | Stop reason: HOSPADM

## 2021-02-24 RX ORDER — PROPOFOL 10 MG/ML
VIAL (ML) INTRAVENOUS AS NEEDED
Status: DISCONTINUED | OUTPATIENT
Start: 2021-02-24 | End: 2021-02-24 | Stop reason: SURG

## 2021-02-24 RX ORDER — OXYCODONE AND ACETAMINOPHEN 7.5; 325 MG/1; MG/1
1 TABLET ORAL ONCE AS NEEDED
Status: DISCONTINUED | OUTPATIENT
Start: 2021-02-24 | End: 2021-02-24 | Stop reason: HOSPADM

## 2021-02-24 RX ORDER — ONDANSETRON 2 MG/ML
INJECTION INTRAMUSCULAR; INTRAVENOUS AS NEEDED
Status: DISCONTINUED | OUTPATIENT
Start: 2021-02-24 | End: 2021-02-24 | Stop reason: SURG

## 2021-02-24 RX ORDER — HYDROCODONE BITARTRATE AND ACETAMINOPHEN 7.5; 325 MG/1; MG/1
1 TABLET ORAL ONCE AS NEEDED
Status: DISCONTINUED | OUTPATIENT
Start: 2021-02-24 | End: 2021-02-24 | Stop reason: HOSPADM

## 2021-02-24 RX ORDER — MIDAZOLAM HYDROCHLORIDE 1 MG/ML
2 INJECTION INTRAMUSCULAR; INTRAVENOUS
Status: DISCONTINUED | OUTPATIENT
Start: 2021-02-24 | End: 2021-02-24 | Stop reason: HOSPADM

## 2021-02-24 RX ORDER — HYDROMORPHONE HYDROCHLORIDE 1 MG/ML
0.5 INJECTION, SOLUTION INTRAMUSCULAR; INTRAVENOUS; SUBCUTANEOUS
Status: DISCONTINUED | OUTPATIENT
Start: 2021-02-24 | End: 2021-02-24 | Stop reason: HOSPADM

## 2021-02-24 RX ORDER — SODIUM CHLORIDE 0.9 % (FLUSH) 0.9 %
3 SYRINGE (ML) INJECTION EVERY 12 HOURS SCHEDULED
Status: DISCONTINUED | OUTPATIENT
Start: 2021-02-24 | End: 2021-02-24 | Stop reason: HOSPADM

## 2021-02-24 RX ORDER — ONDANSETRON 2 MG/ML
4 INJECTION INTRAMUSCULAR; INTRAVENOUS ONCE AS NEEDED
Status: DISCONTINUED | OUTPATIENT
Start: 2021-02-24 | End: 2021-02-24 | Stop reason: HOSPADM

## 2021-02-24 RX ADMIN — EPHEDRINE SULFATE 10 MG: 50 INJECTION INTRAVENOUS at 14:17

## 2021-02-24 RX ADMIN — GLYCOPYRROLATE 0.2 MG: 0.2 INJECTION INTRAMUSCULAR; INTRAVENOUS at 14:29

## 2021-02-24 RX ADMIN — PHENYLEPHRINE HYDROCHLORIDE 200 MCG: 10 INJECTION INTRAVENOUS at 14:24

## 2021-02-24 RX ADMIN — SUCCINYLCHOLINE CHLORIDE 170 MG: 20 INJECTION, SOLUTION INTRAMUSCULAR; INTRAVENOUS; PARENTERAL at 13:52

## 2021-02-24 RX ADMIN — CEFAZOLIN SODIUM 2 G: 2 INJECTION, SOLUTION INTRAVENOUS at 13:57

## 2021-02-24 RX ADMIN — PHENYLEPHRINE HYDROCHLORIDE 300 MCG: 10 INJECTION INTRAVENOUS at 14:32

## 2021-02-24 RX ADMIN — EPHEDRINE SULFATE 10 MG: 50 INJECTION INTRAVENOUS at 13:59

## 2021-02-24 RX ADMIN — MIDAZOLAM 2 MG: 1 INJECTION INTRAMUSCULAR; INTRAVENOUS at 13:15

## 2021-02-24 RX ADMIN — PHENYLEPHRINE HYDROCHLORIDE 200 MCG: 10 INJECTION INTRAVENOUS at 14:07

## 2021-02-24 RX ADMIN — PHENYLEPHRINE HYDROCHLORIDE 200 MCG: 10 INJECTION INTRAVENOUS at 14:27

## 2021-02-24 RX ADMIN — NEOSTIGMINE METHYLSULFATE 3 MG: 0.5 INJECTION INTRAVENOUS at 15:03

## 2021-02-24 RX ADMIN — SODIUM CHLORIDE, POTASSIUM CHLORIDE, SODIUM LACTATE AND CALCIUM CHLORIDE: 600; 310; 30; 20 INJECTION, SOLUTION INTRAVENOUS at 13:42

## 2021-02-24 RX ADMIN — EPHEDRINE SULFATE 10 MG: 50 INJECTION INTRAVENOUS at 14:05

## 2021-02-24 RX ADMIN — EPHEDRINE SULFATE 10 MG: 50 INJECTION INTRAVENOUS at 14:12

## 2021-02-24 RX ADMIN — GLYCOPYRROLATE 0.4 MG: 0.2 INJECTION INTRAMUSCULAR; INTRAVENOUS at 15:03

## 2021-02-24 RX ADMIN — PHENYLEPHRINE HYDROCHLORIDE 150 MCG: 10 INJECTION INTRAVENOUS at 14:17

## 2021-02-24 RX ADMIN — FENTANYL CITRATE 50 MCG: 50 INJECTION, SOLUTION INTRAMUSCULAR; INTRAVENOUS at 13:17

## 2021-02-24 RX ADMIN — PROPOFOL 50 MG: 10 INJECTION, EMULSION INTRAVENOUS at 13:48

## 2021-02-24 RX ADMIN — DEXMEDETOMIDINE 4 MCG: 100 INJECTION, SOLUTION, CONCENTRATE INTRAVENOUS at 13:41

## 2021-02-24 RX ADMIN — DEXMEDETOMIDINE 4 MCG: 100 INJECTION, SOLUTION, CONCENTRATE INTRAVENOUS at 14:12

## 2021-02-24 RX ADMIN — FAMOTIDINE 20 MG: 10 INJECTION INTRAVENOUS at 13:16

## 2021-02-24 RX ADMIN — PHENYLEPHRINE HYDROCHLORIDE 100 MCG: 10 INJECTION INTRAVENOUS at 14:02

## 2021-02-24 RX ADMIN — PROPOFOL 50 MG: 10 INJECTION, EMULSION INTRAVENOUS at 13:49

## 2021-02-24 RX ADMIN — ROCURONIUM BROMIDE 20 MG: 50 INJECTION INTRAVENOUS at 14:04

## 2021-02-24 RX ADMIN — PHENYLEPHRINE HYDROCHLORIDE 100 MCG: 10 INJECTION INTRAVENOUS at 14:41

## 2021-02-24 RX ADMIN — EPHEDRINE SULFATE 10 MG: 50 INJECTION INTRAVENOUS at 14:33

## 2021-02-24 RX ADMIN — PHENYLEPHRINE HYDROCHLORIDE 100 MCG: 10 INJECTION INTRAVENOUS at 14:13

## 2021-02-24 RX ADMIN — ROPIVACAINE HYDROCHLORIDE 20 ML: 5 INJECTION, SOLUTION EPIDURAL; INFILTRATION; PERINEURAL at 13:23

## 2021-02-24 RX ADMIN — SODIUM CHLORIDE, POTASSIUM CHLORIDE, SODIUM LACTATE AND CALCIUM CHLORIDE 9 ML/HR: 600; 310; 30; 20 INJECTION, SOLUTION INTRAVENOUS at 12:52

## 2021-02-24 RX ADMIN — PHENYLEPHRINE HYDROCHLORIDE 100 MCG: 10 INJECTION INTRAVENOUS at 14:53

## 2021-02-24 RX ADMIN — EPHEDRINE SULFATE 10 MG: 50 INJECTION INTRAVENOUS at 14:02

## 2021-02-24 RX ADMIN — PHENYLEPHRINE HYDROCHLORIDE 100 MCG: 10 INJECTION INTRAVENOUS at 14:57

## 2021-02-24 RX ADMIN — ONDANSETRON 4 MG: 2 INJECTION INTRAMUSCULAR; INTRAVENOUS at 14:38

## 2021-02-24 RX ADMIN — PHENYLEPHRINE HYDROCHLORIDE 200 MCG: 10 INJECTION INTRAVENOUS at 14:46

## 2021-02-24 RX ADMIN — FENTANYL CITRATE 50 MCG: 50 INJECTION, SOLUTION INTRAMUSCULAR; INTRAVENOUS at 13:16

## 2021-02-24 RX ADMIN — PROPOFOL 200 MG: 10 INJECTION, EMULSION INTRAVENOUS at 13:43

## 2021-02-24 RX ADMIN — SODIUM CHLORIDE, POTASSIUM CHLORIDE, SODIUM LACTATE AND CALCIUM CHLORIDE: 600; 310; 30; 20 INJECTION, SOLUTION INTRAVENOUS at 15:09

## 2021-02-24 RX ADMIN — LIDOCAINE HYDROCHLORIDE 100 MG: 20 INJECTION, SOLUTION INFILTRATION; PERINEURAL at 13:43

## 2021-02-24 NOTE — ANESTHESIA PROCEDURE NOTES
Airway  Urgency: elective    Date/Time: 2/24/2021 1:45 PM    General Information and Staff    Patient location during procedure: OR  Anesthesiologist: Leland Meredith MD  CRNA: Diamond Ambrosio CRNA    Indications and Patient Condition  Indications for airway management: airway protection    Preoxygenated: yes  Mask difficulty assessment: 3 - difficult mask (inadequate, unstable or two providers) +/- NMBA    Final Airway Details  Final airway type: endotracheal airway      Successful airway: ETT  Cuffed: yes   Successful intubation technique: video laryngoscopy  Facilitating devices/methods: intubating stylet  Endotracheal tube insertion site: oral  Blade: CMAC  Blade size: D  ETT size (mm): 7.5  Cormack-Lehane Classification: grade IIa - partial view of glottis  Placement verified by: chest auscultation and capnometry   Measured from: lips  ETT/EBT  to lips (cm): 23  Number of attempts at approach: 1  Assessment: lips, teeth, and gum same as pre-op and atraumatic intubation    Additional Comments  Insert LMA #5, unable to generate seal with adequate volumes after deepening sedation, proceeded to intubation. CMAC D blade used for first attempt related to prominent incisors and limited neck ROM. 2 handed mask with 10 OA related to large beard.

## 2021-02-24 NOTE — ANESTHESIA PREPROCEDURE EVALUATION
Anesthesia Evaluation     Patient summary reviewed   history of anesthetic complications (hx of violent wakeup):  NPO Solid Status: > 8 hours  NPO Liquid Status: > 2 hours           Airway   Mallampati: I  TM distance: >3 FB  Neck ROM: full  No difficulty expected  Dental - normal exam     Pulmonary - normal exam   (+) a smoker Former, sleep apnea (no machine),   (-) shortness of breath, recent URI  Cardiovascular - normal exam  Exercise tolerance: good (4-7 METS)    ECG reviewed    (+) hypertension, hyperlipidemia,   (-) past MI, angina, KENDRICK    ROS comment: Trace AR    Neuro/Psych  (+) headaches, dizziness/light headedness, syncope, numbness (diabetic peripheral neuropathy),     (-) seizures, neuromuscular disease, TIA, CVA  GI/Hepatic/Renal/Endo    (+)  GI bleeding , diabetes mellitus type 2,     Musculoskeletal     (-) neck stiffness  Abdominal  - normal exam    Bowel sounds: normal.   Substance History - negative use     OB/GYN negative ob/gyn ROS         Other   arthritis,    history of cancer                    Anesthesia Plan    ASA 3     general with block   (Pt has hx waking violently. Have advised crna.    +ISB USGSS for POPC    Reports hx of violent wake up)  intravenous induction     Anesthetic plan, all risks, benefits, and alternatives have been provided, discussed and informed consent has been obtained with: patient.    Plan discussed with CRNA.

## 2021-02-24 NOTE — ANESTHESIA PROCEDURE NOTES
Peripheral Block    Pre-sedation assessment completed: 2/24/2021 1:17 PM    Patient reassessed immediately prior to procedure    Patient location during procedure: pre-op  Start time: 2/24/2021 1:17 PM  Stop time: 2/24/2021 1:23 PM  Reason for block: at surgeon's request and post-op pain management  Performed by  Anesthesiologist: Leland Meredith MD  Preanesthetic Checklist  Completed: patient identified, site marked, surgical consent, pre-op evaluation, timeout performed, IV checked, risks and benefits discussed and monitors and equipment checked  Prep:  Sterile barriers:gloves  Prep: ChloraPrep  Patient monitoring: blood pressure monitoring, continuous pulse oximetry and EKG  Procedure  Sedation:yes    Guidance:ultrasound guided  ULTRASOUND INTERPRETATION.  Using ultrasound guidance a 22 G gauge needle was placed in close proximity to the brachial plexus nerve, at which point, under ultrasound guidance anesthetic was injected in the area of the nerve and spread of the anesthesia was seen on ultrasound in close proximity thereto.  There were no abnormalities seen on ultrasound; a digital image was taken; and the patient tolerated the procedure with no complications. Images:still images obtained    Laterality:right  Block Type:interscalene  Injection Technique:single-shot  Needle Type:short-bevel  Needle Gauge:22 G      Medications Used: ropivacaine (NAROPIN) 0.5 % injection, 20 mL      Medications  Comment:.    Post Assessment  Injection Assessment: negative aspiration for heme, no paresthesia on injection and incremental injection  Patient Tolerance:comfortable throughout block  Complications:no

## 2021-02-24 NOTE — ANESTHESIA POSTPROCEDURE EVALUATION
Patient: Flaco Miles Sr.    Procedure Summary     Date: 02/24/21 Room / Location:  [facility]  [facility]    Anesthesia Start: 1338 Anesthesia Stop: 1528    Procedure: RIGHT SHOULDER ARTHROSCOPY WITH ROTATOR CUFF REPAIR AND ACROMIOPLASTY WITH DEBRIDEMENT  FOLLOWED BY OPEN DISTAL CLAVICLE EXCISION INTRASCALEN BLOCK (Right Shoulder) Diagnosis:     Surgeon: Ramon Sun MD Provider: Leland Meredith MD    Anesthesia Type: general with block ASA Status: 3          Anesthesia Type: general with block    Vitals  Vitals Value Taken Time   /59 02/24/21 1600   Temp 37.4 °C (99.3 °F) 02/24/21 1600   Pulse 72 02/24/21 1604   Resp 24 02/24/21 1600   SpO2 91 % 02/24/21 1604   Vitals shown include unvalidated device data.        Post Anesthesia Care and Evaluation    Patient location during evaluation: bedside  Patient participation: complete - patient participated  Level of consciousness: awake and alert  Pain management: adequate  Airway patency: patent  Anesthetic complications: No anesthetic complications    Cardiovascular status: acceptable  Respiratory status: acceptable  Hydration status: acceptable    Comments: /58 (BP Location: Left arm, Patient Position: Sitting)   Pulse 73   Temp 37.4 °C (99.3 °F) (Temporal)   Resp 18   SpO2 94%

## 2021-07-26 ENCOUNTER — TRANSCRIBE ORDERS (OUTPATIENT)
Dept: ADMINISTRATIVE | Facility: HOSPITAL | Age: 56
End: 2021-07-26

## 2021-07-26 ENCOUNTER — HOSPITAL ENCOUNTER (OUTPATIENT)
Dept: CT IMAGING | Facility: HOSPITAL | Age: 56
Discharge: HOME | End: 2021-07-26

## 2021-07-26 ENCOUNTER — LAB (OUTPATIENT)
Dept: LAB | Facility: HOSPITAL | Age: 56
End: 2021-07-26

## 2021-07-26 DIAGNOSIS — R97.20 ELEVATED PROSTATE SPECIFIC ANTIGEN (PSA): ICD-10-CM

## 2021-07-26 DIAGNOSIS — N20.0 KIDNEY STONE: ICD-10-CM

## 2021-07-26 DIAGNOSIS — R31.0 GROSS HEMATURIA: ICD-10-CM

## 2021-07-26 DIAGNOSIS — N20.0 KIDNEY STONE: Primary | ICD-10-CM

## 2021-07-26 LAB — PSA SERPL-MCNC: 4.56 NG/ML (ref 0–4)

## 2021-07-26 PROCEDURE — 36415 COLL VENOUS BLD VENIPUNCTURE: CPT

## 2021-07-26 PROCEDURE — 74176 CT ABD & PELVIS W/O CONTRAST: CPT

## 2021-07-26 PROCEDURE — 84153 ASSAY OF PSA TOTAL: CPT

## 2021-08-17 ENCOUNTER — TRANSCRIBE ORDERS (OUTPATIENT)
Dept: ADMINISTRATIVE | Facility: HOSPITAL | Age: 56
End: 2021-08-17

## 2021-08-17 DIAGNOSIS — N43.3 HYDROCELE, UNSPECIFIED HYDROCELE TYPE: Primary | ICD-10-CM

## 2021-08-26 ENCOUNTER — OFFICE VISIT (OUTPATIENT)
Dept: FAMILY MEDICINE CLINIC | Facility: CLINIC | Age: 56
End: 2021-08-26

## 2021-08-26 VITALS
TEMPERATURE: 96.9 F | HEART RATE: 85 BPM | OXYGEN SATURATION: 97 % | SYSTOLIC BLOOD PRESSURE: 128 MMHG | DIASTOLIC BLOOD PRESSURE: 82 MMHG | BODY MASS INDEX: 30.04 KG/M2 | HEIGHT: 72 IN | WEIGHT: 221.8 LBS

## 2021-08-26 DIAGNOSIS — E78.2 MIXED HYPERLIPIDEMIA: ICD-10-CM

## 2021-08-26 DIAGNOSIS — Z79.899 HIGH RISK MEDICATION USE: ICD-10-CM

## 2021-08-26 DIAGNOSIS — G89.4 CHRONIC PAIN SYNDROME: ICD-10-CM

## 2021-08-26 DIAGNOSIS — E11.42 TYPE 2 DIABETES MELLITUS WITH DIABETIC POLYNEUROPATHY, WITHOUT LONG-TERM CURRENT USE OF INSULIN (HCC): Primary | ICD-10-CM

## 2021-08-26 DIAGNOSIS — R32 URINARY INCONTINENCE DUE TO URETHRAL SPHINCTER INCOMPETENCE: ICD-10-CM

## 2021-08-26 DIAGNOSIS — N36.42 URINARY INCONTINENCE DUE TO URETHRAL SPHINCTER INCOMPETENCE: ICD-10-CM

## 2021-08-26 DIAGNOSIS — N40.0 BPH WITH ELEVATED PSA: ICD-10-CM

## 2021-08-26 DIAGNOSIS — R97.20 BPH WITH ELEVATED PSA: ICD-10-CM

## 2021-08-26 LAB — GLUCOSE BLDC GLUCOMTR-MCNC: 356 MG/DL (ref 70–130)

## 2021-08-26 PROCEDURE — 99214 OFFICE O/P EST MOD 30 MIN: CPT | Performed by: FAMILY MEDICINE

## 2021-08-26 RX ORDER — DULOXETIN HYDROCHLORIDE 60 MG/1
60 CAPSULE, DELAYED RELEASE ORAL 2 TIMES DAILY
COMMUNITY
Start: 2021-07-08

## 2021-08-26 NOTE — PATIENT INSTRUCTIONS
Diabetes Mellitus and Nutrition, Adult  When you have diabetes, or diabetes mellitus, it is very important to have healthy eating habits because your blood sugar (glucose) levels are greatly affected by what you eat and drink. Eating healthy foods in the right amounts, at about the same times every day, can help you:  · Control your blood glucose.  · Lower your risk of heart disease.  · Improve your blood pressure.  · Reach or maintain a healthy weight.  What can affect my meal plan?  Every person with diabetes is different, and each person has different needs for a meal plan. Your health care provider may recommend that you work with a dietitian to make a meal plan that is best for you. Your meal plan may vary depending on factors such as:  · The calories you need.  · The medicines you take.  · Your weight.  · Your blood glucose, blood pressure, and cholesterol levels.  · Your activity level.  · Other health conditions you have, such as heart or kidney disease.  How do carbohydrates affect me?  Carbohydrates, also called carbs, affect your blood glucose level more than any other type of food. Eating carbs naturally raises the amount of glucose in your blood. Carb counting is a method for keeping track of how many carbs you eat. Counting carbs is important to keep your blood glucose at a healthy level, especially if you use insulin or take certain oral diabetes medicines.  It is important to know how many carbs you can safely have in each meal. This is different for every person. Your dietitian can help you calculate how many carbs you should have at each meal and for each snack.  How does alcohol affect me?  Alcohol can cause a sudden decrease in blood glucose (hypoglycemia), especially if you use insulin or take certain oral diabetes medicines. Hypoglycemia can be a life-threatening condition. Symptoms of hypoglycemia, such as sleepiness, dizziness, and confusion, are similar to symptoms of having too much  "alcohol.  · Do not drink alcohol if:  ? Your health care provider tells you not to drink.  ? You are pregnant, may be pregnant, or are planning to become pregnant.  · If you drink alcohol:  ? Do not drink on an empty stomach.  ? Limit how much you use to:  § 0-1 drink a day for women.  § 0-2 drinks a day for men.  ? Be aware of how much alcohol is in your drink. In the [place] one drink equals one 12 oz bottle of beer (355 mL), one 5 oz glass of wine (148 mL), or one 1½ oz glass of hard liquor (44 mL).  ? Keep yourself hydrated with water, diet soda, or unsweetened iced tea.  § Keep in mind that regular soda, juice, and other mixers may contain a lot of sugar and must be counted as carbs.  What are tips for following this plan?    Reading food labels  · Start by checking the serving size on the \"Nutrition Facts\" label of packaged foods and drinks. The amount of calories, carbs, fats, and other nutrients listed on the label is based on one serving of the item. Many items contain more than one serving per package.  · Check the total grams (g) of carbs in one serving. You can calculate the number of servings of carbs in one serving by dividing the total carbs by 15. For example, if a food has 30 g of total carbs per serving, it would be equal to 2 servings of carbs.  · Check the number of grams (g) of saturated fats and trans fats in one serving. Choose foods that have a low amount or none of these fats.  · Check the number of milligrams (mg) of salt (sodium) in one serving. Most people should limit total sodium intake to less than 2,300 mg per day.  · Always check the nutrition information of foods labeled as \"low-fat\" or \"nonfat.\" These foods may be higher in added sugar or refined carbs and should be avoided.  · Talk to your dietitian to identify your daily goals for nutrients listed on the label.  Shopping  · Avoid buying canned, pre-made, or processed foods. These foods tend to be high in fat, sodium, and added " sugar.  · Shop around the outside edge of the grocery store. This is where you will most often find fresh fruits and vegetables, bulk grains, fresh meats, and fresh dairy.  Cooking  · Use low-heat cooking methods, such as baking, instead of high-heat cooking methods like deep frying.  · Cook using healthy oils, such as olive, canola, or sunflower oil.  · Avoid cooking with butter, cream, or high-fat meats.  Meal planning  · Eat meals and snacks regularly, preferably at the same times every day. Avoid going long periods of time without eating.  · Eat foods that are high in fiber, such as fresh fruits, vegetables, beans, and whole grains. Talk with your dietitian about how many servings of carbs you can eat at each meal.  · Eat 4-6 oz (112-168 g) of lean protein each day, such as lean meat, chicken, fish, eggs, or tofu. One ounce (oz) of lean protein is equal to:  ? 1 oz (28 g) of meat, chicken, or fish.  ? 1 egg.  ? ¼ cup (62 g) of tofu.  · Eat some foods each day that contain healthy fats, such as avocado, nuts, seeds, and fish.  What foods should I eat?  Fruits  Berries. Apples. Oranges. Peaches. Apricots. Plums. Grapes. Redd. Papaya. Pomegranate. Kiwi. Cherries.  Vegetables  Lettuce. Spinach. Leafy greens, including kale, chard, alyssa greens, and mustard greens. Beets. Cauliflower. Cabbage. Broccoli. Carrots. Green beans. Tomatoes. Peppers. Onions. Cucumbers. [place] sprouts.  Grains  Whole grains, such as whole-wheat or whole-grain bread, crackers, tortillas, cereal, and pasta. Unsweetened oatmeal. Quinoa. Brown or wild rice.  Meats and other proteins  Seafood. Poultry without skin. Lean cuts of poultry and beef. Tofu. Nuts. Seeds.  Dairy  Low-fat or fat-free dairy products such as milk, yogurt, and cheese.  The items listed above may not be a complete list of foods and beverages you can eat. Contact a dietitian for more information.  What foods should I avoid?  Fruits  Fruits canned with  syrup.  Vegetables  Canned vegetables. Frozen vegetables with butter or cream sauce.  Grains  Refined white flour and flour products such as bread, pasta, snack foods, and cereals. Avoid all processed foods.  Meats and other proteins  Fatty cuts of meat. Poultry with skin. Breaded or fried meats. Processed meat. Avoid saturated fats.  Dairy  Full-fat yogurt, cheese, or milk.  Beverages  Sweetened drinks, such as soda or iced tea.  The items listed above may not be a complete list of foods and beverages you should avoid. Contact a dietitian for more information.  Questions to ask a health care provider  · Do I need to meet with a diabetes educator?  · Do I need to meet with a dietitian?  · What number can I call if I have questions?  · When are the best times to check my blood glucose?  Where to find more information:  · American Diabetes Association: diabetes.org  · Academy of Nutrition and Dietetics: www.eatright.org  · National [place] of Diabetes and Digestive and Kidney Diseases: www.niddk.nih.gov  · Association of Diabetes Care and Education Specialists: www.diabeteseducator.org  Summary  · It is important to have healthy eating habits because your blood sugar (glucose) levels are greatly affected by what you eat and drink.  · A healthy meal plan will help you control your blood glucose and maintain a healthy lifestyle.  · Your health care provider may recommend that you work with a dietitian to make a meal plan that is best for you.  · Keep in mind that carbohydrates (carbs) and alcohol have immediate effects on your blood glucose levels. It is important to count carbs and to use alcohol carefully.  This information is not intended to replace advice given to you by your health care provider. Make sure you discuss any questions you have with your health care provider.  Document Revised: 11/24/2020 Document Reviewed: 11/24/2020  [employer] Patient Education © 2021 [employer]      Diabetes Mellitus and  "Exercise  Exercising regularly is important for overall health, especially for people who have diabetes mellitus. Exercising is not only about losing weight. It has many other health benefits, such as increasing muscle strength and bone density and reducing body fat and stress. This leads to improved fitness, flexibility, and endurance, all of which result in better overall health.  What are the benefits of exercise if I have diabetes?  Exercise has many benefits for people with diabetes. They include:  · Helping to lower and control blood sugar (glucose).  · Helping the body to respond better to the hormone insulin by improving insulin sensitivity.  · Reducing how much insulin the body needs.  · Lowering the risk for heart disease by:  ? Lowering \"bad\" cholesterol and triglyceride levels.  ? Increasing \"good\" cholesterol levels.  ? Lowering blood pressure.  ? Lowering blood glucose levels.  What is my activity plan?  Your health care provider or certified diabetes educator can help you make a plan for the type and frequency of exercise that works for you. This is called your activity plan. Be sure to:  · Get at least 150 minutes of medium-intensity or high-intensity exercise each week. Exercises may include brisk walking, biking, or water aerobics.  · Do stretching and strengthening exercises, such as yoga or weight lifting, at least 2 times a week.  · Spread out your activity over at least 3 days of the week.  · Get some form of physical activity each day.  ? Do not go more than 2 days in a row without some kind of physical activity.  ? Avoid being inactive for more than 90 minutes at a time. Take frequent breaks to walk or stretch.  · Choose exercises or activities that you enjoy. Set realistic goals.  · Start slowly and gradually increase your exercise intensity over time.  How do I manage my diabetes during exercise?    Monitor your blood glucose  · Check your blood glucose before and after exercising. If your " blood glucose is:  ? 240 mg/dL (13.3 mmol/L) or higher before you exercise, check your urine for ketones. These are chemicals created by the liver. If you have ketones in your urine, do not exercise until your blood glucose returns to normal.  ? 100 mg/dL (5.6 mmol/L) or lower, eat a snack containing 15-20 grams of carbohydrate. Check your blood glucose 15 minutes after the snack to make sure that your glucose level is above 100 mg/dL (5.6 mmol/L) before you start your exercise.  · Know the symptoms of low blood glucose (hypoglycemia) and how to treat it. Your risk for hypoglycemia increases during and after exercise.  Follow these tips and your health care provider's instructions  · Keep a carbohydrate snack that is fast-acting for use before, during, and after exercise to help prevent or treat hypoglycemia.  · Avoid injecting insulin into areas of the body that are going to be exercised. For example, avoid injecting insulin into:  ? Your arms, when you are about to play tennis.  ? Your legs, when you are about to go jogging.  · Keep records of your exercise habits. Doing this can help you and your health care provider adjust your diabetes management plan as needed. Write down:  ? Food that you eat before and after you exercise.  ? Blood glucose levels before and after you exercise.  ? The type and amount of exercise you have done.  · Work with your health care provider when you start a new exercise or activity. He or she may need to:  ? Make sure that the activity is safe for you.  ? Adjust your insulin, other medicines, and food that you eat.  · Drink plenty of water while you exercise. This prevents loss of water (dehydration) and problems caused by a lot of heat in the body (heat stroke).  Where to find more information  · American Diabetes Association: www.diabetes.org  Summary  · Exercising regularly is important for overall health, especially for people who have diabetes mellitus.  · Exercising has many  health benefits. It increases muscle strength and bone density and reduces body fat and stress. It also lowers and controls blood glucose.  · Your health care provider or certified diabetes educator can help you make an activity plan for the type and frequency of exercise that works for you.  · Work with your health care provider to make sure any new activity is safe for you. Also work with your health care provider to adjust your insulin, other medicines, and the food you eat.  This information is not intended to replace advice given to you by your health care provider. Make sure you discuss any questions you have with your health care provider.  Document Revised: 09/14/2020 Document Reviewed: 09/14/2020  [employer] Patient Education © 2021 [employer]      Exercising to Lose Weight  Exercise is structured, repetitive physical activity to improve fitness and health. Getting regular exercise is important for everyone. It is especially important if you are overweight. Being overweight increases your risk of heart disease, stroke, diabetes, high blood pressure, and several types of cancer. Reducing your calorie intake and exercising can help you lose weight.  Exercise is usually categorized as moderate or vigorous intensity. To lose weight, most people need to do a certain amount of moderate-intensity or vigorous-intensity exercise each week.  Moderate-intensity exercise    Moderate-intensity exercise is any activity that gets you moving enough to burn at least three times more energy (calories) than if you were sitting.  Examples of moderate exercise include:  · Walking a mile in 15 minutes.  · Doing light yard work.  · Biking at an easy pace.  Most people should get at least 150 minutes (2 hours and 30 minutes) a week of moderate-intensity exercise to maintain their body weight.  Vigorous-intensity exercise  Vigorous-intensity exercise is any activity that gets you moving enough to burn at least six times more  calories than if you were sitting. When you exercise at this intensity, you should be working hard enough that you are not able to carry on a conversation.  Examples of vigorous exercise include:  · Running.  · Playing a team sport, such as football, basketball, and soccer.  · Jumping rope.  Most people should get at least 75 minutes (1 hour and 15 minutes) a week of vigorous-intensity exercise to maintain their body weight.  How can exercise affect me?  When you exercise enough to burn more calories than you eat, you lose weight. Exercise also reduces body fat and builds muscle. The more muscle you have, the more calories you burn. Exercise also:  · Improves mood.  · Reduces stress and tension.  · Improves your overall fitness, flexibility, and endurance.  · Increases bone strength.  The amount of exercise you need to lose weight depends on:  · Your age.  · The type of exercise.  · Any health conditions you have.  · Your overall physical ability.  Talk to your health care provider about how much exercise you need and what types of activities are safe for you.  What actions can I take to lose weight?  Nutrition    · Make changes to your diet as told by your health care provider or diet and nutrition specialist (dietitian). This may include:  ? Eating fewer calories.  ? Eating more protein.  ? Eating less unhealthy fats.  ? Eating a diet that includes fresh fruits and vegetables, whole grains, low-fat dairy products, and lean protein.  ? Avoiding foods with added fat, salt, and sugar.  · Drink plenty of water while you exercise to prevent dehydration or heat stroke.  Activity  · Choose an activity that you enjoy and set realistic goals. Your health care provider can help you make an exercise plan that works for you.  · Exercise at a moderate or vigorous intensity most days of the week.  ? The intensity of exercise may vary from person to person. You can tell how intense a workout is for you by paying attention to  your breathing and heartbeat. Most people will notice their breathing and heartbeat get faster with more intense exercise.  · Do resistance training twice each week, such as:  ? Push-ups.  ? Sit-ups.  ? Lifting weights.  ? Using resistance bands.  · Getting short amounts of exercise can be just as helpful as long structured periods of exercise. If you have trouble finding time to exercise, try to include exercise in your daily routine.  ? Get up, stretch, and walk around every 30 minutes throughout the day.  ? Go for a walk during your lunch break.  ? Park your car farther away from your destination.  ? If you take public transportation, get off one stop early and walk the rest of the way.  ? Make phone calls while standing up and walking around.  ? Take the stairs instead of elevators or escalators.  · Wear comfortable clothes and shoes with good support.  · Do not exercise so much that you hurt yourself, feel dizzy, or get very short of breath.  Where to find more information  · [place] Department of Health and Human Services: www.hhs.gov  · Centers for Disease Control and Prevention (CDC): www.cdc.gov  Contact a health care provider:  · Before starting a new exercise program.  · If you have questions or concerns about your weight.  · If you have a medical problem that keeps you from exercising.  Get help right away if you have any of the following while exercising:  · Injury.  · Dizziness.  · Difficulty breathing or shortness of breath that does not go away when you stop exercising.  · Chest pain.  · Rapid heartbeat.  Summary  · Being overweight increases your risk of heart disease, stroke, diabetes, high blood pressure, and several types of cancer.  · Losing weight happens when you burn more calories than you eat.  · Reducing the amount of calories you eat in addition to getting regular moderate or vigorous exercise each week helps you lose weight.  This information is not intended to replace advice given to you by  your health care provider. Make sure you discuss any questions you have with your health care provider.  Document Revised: 04/15/2021 Document Reviewed: 04/15/2021  [employer] Patient Education © 2021 [employer]

## 2021-08-26 NOTE — ASSESSMENT & PLAN NOTE
He is following with Dr. Jose Barron, Urology. He stopped taking his Flomax. His recent PSA was slightly elevated.

## 2021-08-26 NOTE — ASSESSMENT & PLAN NOTE
Discontinue Metformin. Start Januvia 100 mg daily. Pt advised and agrees to stop drinking sweetened beverages and concentrated sweets. He also agrees to eliminate excess sugars and carbohydrates. He states he eats a lot of bread, pasta, potatoes, and rice. He eats out a lot and agrees to stop eating those things.

## 2021-08-26 NOTE — PROGRESS NOTES
Chief Complaint  Hyperglycemia (`), Fatigue, and Lack of Energy    Subjective          Flaco FLORI Miles Sr. presents to [facility] for     History of Present Illness    Pt states he has not taken his Metformin for 3 months. The last time he checked his blood sugar was 2 weeks ago in [place] after drinking orange juice and Daqueri and his blood sugar was too high to register on his meter. He states he had been drinking 3 bottles of orange juice for breakfast every day for a long time. He has been on Januvia in the past and did not have any trouble with that medicine. He stopped taking it because his blood sugar had improved. He states he eats a lot of bread, pasta, potatoes, and rice. He eats out a lot.    He follows with Jose Barron, Urology for urethral sphincter dysfunction. He has been referred to a specialist to have surgery to fix this. He had a recently elevated PSA that Dr. Barron is following. He is going to get a pacemaker in his back to control his urethral sphincter. He has BPH and recently stopped taking his Flomax.    He is obese and is going to work on improving his diet to lose weight.    He has HLD and stopped taking his Lipitor.    He has HTN and stopped taking Amlodipine.    He has chronic gout and stopped taking Allopurinol.    He has chronic pain and is following with pain management.    Health Maintenance Due   Topic Date Due   • ZOSTER VACCINE (1 of 2) Never done   • DIABETIC FOOT EXAM  10/16/2019   • HEMOGLOBIN A1C  01/30/2021   • COVID-19 Vaccine (2 - Moderna 2-dose series) 06/02/2021   • ANNUAL WELLNESS VISIT  07/30/2021   • LIPID PANEL  07/30/2021   • URINE MICROALBUMIN  07/30/2021        reports that he quit smoking about 22 years ago. His smoking use included pipe and cigarettes. He smoked 1.00 pack per day. His smokeless tobacco use includes snuff. He reports that he does not drink alcohol and does not use drugs.    PHQ-9 Depression Screening  Little  interest or pleasure in doing things? 0   Feeling down, depressed, or hopeless? 0   Trouble falling or staying asleep, or sleeping too much?     Feeling tired or having little energy?     Poor appetite or overeating?     Feeling bad about yourself - or that you are a failure or have let yourself or your family down?     Trouble concentrating on things, such as reading the newspaper or watching television?     Moving or speaking so slowly that other people could have noticed? Or the opposite - being so fidgety or restless that you have been moving around a lot more than usual?     Thoughts that you would be better off dead, or of hurting yourself in some way?     PHQ-9 Total Score 0   If you checked off any problems, how difficult have these problems made it for you to do your work, take care of things at home, or get along with other people?       Lab Results   Component Value Date    WBC 8.21 02/22/2021    HGB 14.8 02/22/2021    HCT 44.3 02/22/2021    MCV 87.4 02/22/2021     02/22/2021     Lab Results   Component Value Date    GLUCOSE 401 (C) 02/22/2021    BUN 13 02/22/2021    CREATININE 0.85 02/22/2021    EGFRIFNONA 94 02/22/2021    EGFRIFAFRI 118 07/30/2020    BCR 15.3 02/22/2021    K 4.0 02/22/2021    CO2 25.2 02/22/2021    CALCIUM 9.1 02/22/2021    PROTENTOTREF 7.2 07/30/2020    ALBUMIN 4.00 02/22/2021    LABIL2 2.1 07/30/2020    AST 26 02/22/2021    ALT 43 (H) 02/22/2021     Lab Results   Component Value Date    TSH 1.880 07/30/2020     Lab Results   Component Value Date    HGBA1C 6.9 (H) 07/30/2020     Brief Urine Lab Results     None          BP Readings from Last 12 Encounters:   08/26/21 128/82   02/24/21 113/58   02/22/21 133/78   02/16/21 128/87   01/18/21 163/92   01/14/21 140/78   11/02/20 130/80   08/17/20 131/60   08/12/20 144/96   07/30/20 140/80   10/29/19 124/82   07/16/19 130/72       Wt Readings from Last 12 Encounters:   08/26/21 101 kg (221 lb 12.8 oz)   02/22/21 109 kg (241 lb)  "  02/16/21 109 kg (240 lb)   01/18/21 109 kg (240 lb)   01/14/21 109 kg (241 lb)   11/02/20 107 kg (236 lb)   09/10/20 106 kg (234 lb 9.6 oz)   08/17/20 103 kg (227 lb)   08/12/20 103 kg (227 lb)   07/30/20 105 kg (231 lb)   10/29/19 108 kg (238 lb)   09/12/19 106 kg (233 lb)       Objective   Vital Signs:   /82 (BP Location: Left arm, Patient Position: Sitting, Cuff Size: Large Adult)   Pulse 85   Temp 96.9 °F (36.1 °C) (Temporal)   Ht 182.9 cm (72\")   Wt 101 kg (221 lb 12.8 oz)   SpO2 97%   BMI 30.08 kg/m²     Physical Exam  Vitals and nursing note reviewed.   Constitutional:       General: He is not in acute distress.     Appearance: Normal appearance. He is well-developed. He is not diaphoretic.   HENT:      Head: Normocephalic and atraumatic.      Right Ear: External ear normal.      Left Ear: External ear normal.      Nose: Nose normal.      Mouth/Throat:      Pharynx: No oropharyngeal exudate.   Eyes:      General: Lids are normal. No scleral icterus.        Right eye: No discharge.         Left eye: No discharge.   Neck:      Thyroid: No thyromegaly.      Trachea: Trachea normal. No tracheal deviation.   Cardiovascular:      Rate and Rhythm: Normal rate and regular rhythm.      Heart sounds: Normal heart sounds. No murmur heard.   No friction rub. No gallop.    Pulmonary:      Effort: Pulmonary effort is normal. No tachypnea, bradypnea or respiratory distress.      Breath sounds: Normal breath sounds. No stridor. No decreased breath sounds, wheezing or rales.   Chest:      Chest wall: No tenderness.   Musculoskeletal:      Cervical back: Full passive range of motion without pain, normal range of motion and neck supple. No edema.   Lymphadenopathy:      Head:      Right side of head: No submental, submandibular, tonsillar, preauricular, posterior auricular or occipital adenopathy.      Left side of head: No submental, submandibular, tonsillar, preauricular, posterior auricular or occipital " adenopathy.      Cervical: No cervical adenopathy.      Right cervical: No superficial, deep or posterior cervical adenopathy.     Left cervical: No superficial, deep or posterior cervical adenopathy.   Skin:     General: Skin is warm and dry.      Capillary Refill: Capillary refill takes less than 2 seconds.      Coloration: Skin is not pale.      Findings: No erythema or rash.      Nails: There is no clubbing.   Neurological:      Mental Status: He is alert and oriented to person, place, and time. He is not disoriented.      Deep Tendon Reflexes: Reflexes are normal and symmetric.   Psychiatric:         Behavior: Behavior normal.        Result Review :                 Assessment and Plan    Diagnoses and all orders for this visit:    1. Type 2 diabetes mellitus with diabetic polyneuropathy, without long-term current use of insulin (CMS/[facility]) (Primary)  Assessment & Plan:  Discontinue Metformin. Start Januvia 100 mg daily. Pt advised and agrees to stop drinking sweetened beverages and concentrated sweets. He also agrees to eliminate excess sugars and carbohydrates. He states he eats a lot of bread, pasta, potatoes, and rice. He eats out a lot and agrees to stop eating those things.      Orders:  -     POC Glucose  -     SITagliptin (Januvia) 100 MG tablet; Take 1 tablet by mouth Daily.  Dispense: 90 tablet; Refill: 1    2. Chronic pain syndrome  Assessment & Plan:  Following with Dr. Roman, pain management.        3. BPH with elevated PSA  Assessment & Plan:  He is following with Dr. Jose Barron, Urology. He stopped taking his Flomax. His recent PSA was slightly elevated.        4. Urinary incontinence due to urethral sphincter incompetence  Assessment & Plan:  Following with Dr. Jose Barron, Urology.         Follow Up   Return in about 3 months (around 11/26/2021) for DM2-U (Discontinued Metformin, started Januvia, improve diet), HTN-C, HLD-?U cbc cmp flp A1c prior.  Patient was given instructions and  counseling regarding his condition or for health maintenance advice. Please see specific information pulled into the AVS if appropriate.

## 2021-09-10 ENCOUNTER — TELEPHONE (OUTPATIENT)
Dept: FAMILY MEDICINE CLINIC | Facility: CLINIC | Age: 56
End: 2021-09-10

## 2021-09-10 DIAGNOSIS — E11.42 TYPE 2 DIABETES MELLITUS WITH DIABETIC POLYNEUROPATHY, WITHOUT LONG-TERM CURRENT USE OF INSULIN (HCC): Primary | ICD-10-CM

## 2021-09-10 NOTE — TELEPHONE ENCOUNTER
PT PHARMACY IS REQUESTING A CALL BACK TO UPDATE ON THE [employer]com PA,OR SEND IN [employer], [employer], OR [employer]. INSURANCE WILL COVER THEM.

## 2021-09-11 NOTE — DISCHARGE INSTRUCTIONS
For the next 24 hours patient needs to be with a responsible adult.    For 24 hours DO NOT drive, operate machinery, appliances, drink alcohol, make important decisions or sign legal documents.    Start with a light or bland diet if you are feeling sick to your stomach otherwise advance to regular diet as tolerated.    Follow recommendations on procedure report if provided by your doctor.    Call Dr Kaba for problems [phone]    Problems may include but not limited to: large amounts of bleeding, trouble breathing, repeated vomiting, severe unrelieved pain, fever or chills.        
8

## 2021-09-23 ENCOUNTER — APPOINTMENT (OUTPATIENT)
Dept: ULTRASOUND IMAGING | Facility: HOSPITAL | Age: 56
End: 2021-09-23

## 2021-09-28 ENCOUNTER — HOSPITAL ENCOUNTER (OUTPATIENT)
Dept: ULTRASOUND IMAGING | Facility: HOSPITAL | Age: 56
Discharge: HOME | End: 2021-09-28
Admitting: UROLOGY

## 2021-09-28 DIAGNOSIS — N43.3 HYDROCELE, UNSPECIFIED HYDROCELE TYPE: ICD-10-CM

## 2021-09-28 PROCEDURE — 76870 US EXAM SCROTUM: CPT

## 2021-09-28 PROCEDURE — 93976 VASCULAR STUDY: CPT

## 2021-09-30 ENCOUNTER — APPOINTMENT (OUTPATIENT)
Dept: ULTRASOUND IMAGING | Facility: HOSPITAL | Age: 56
End: 2021-09-30

## 2021-10-22 ENCOUNTER — HOSPITAL ENCOUNTER (OUTPATIENT)
Dept: CARDIOLOGY | Facility: HOSPITAL | Age: 56
Discharge: HOME | End: 2021-10-22

## 2021-10-22 ENCOUNTER — LAB (OUTPATIENT)
Dept: LAB | Facility: HOSPITAL | Age: 56
End: 2021-10-22

## 2021-10-22 ENCOUNTER — TRANSCRIBE ORDERS (OUTPATIENT)
Dept: ADMINISTRATIVE | Facility: HOSPITAL | Age: 56
End: 2021-10-22

## 2021-10-22 DIAGNOSIS — R39.15 URGENCY OF URINATION: ICD-10-CM

## 2021-10-22 DIAGNOSIS — R35.0 URINARY FREQUENCY: ICD-10-CM

## 2021-10-22 DIAGNOSIS — N39.41 URINARY INCONTINENCE, URGE: Primary | ICD-10-CM

## 2021-10-22 DIAGNOSIS — Z01.818 PRE-OP EXAM: ICD-10-CM

## 2021-10-22 DIAGNOSIS — N39.41 URINARY INCONTINENCE, URGE: ICD-10-CM

## 2021-10-22 LAB
ANION GAP SERPL CALCULATED.3IONS-SCNC: 11.3 MMOL/L (ref 5–15)
BASOPHILS # BLD AUTO: 0.04 10*3/MM3 (ref 0–0.2)
BASOPHILS NFR BLD AUTO: 0.6 % (ref 0–1.5)
BUN SERPL-MCNC: 14 MG/DL (ref 6–20)
BUN/CREAT SERPL: 17.1 (ref 7–25)
CALCIUM SPEC-SCNC: 9.4 MG/DL (ref 8.6–10.5)
CHLORIDE SERPL-SCNC: 99 MMOL/L (ref 98–107)
CO2 SERPL-SCNC: 26.7 MMOL/L (ref 22–29)
CREAT SERPL-MCNC: 0.82 MG/DL (ref 0.76–1.27)
DEPRECATED RDW RBC AUTO: 41.7 FL (ref 37–54)
EOSINOPHIL # BLD AUTO: 0.11 10*3/MM3 (ref 0–0.4)
EOSINOPHIL NFR BLD AUTO: 1.6 % (ref 0.3–6.2)
ERYTHROCYTE [DISTWIDTH] IN BLOOD BY AUTOMATED COUNT: 13.7 % (ref 12.3–15.4)
GFR SERPL CREATININE-BSD FRML MDRD: 98 ML/MIN/1.73
GLUCOSE SERPL-MCNC: 272 MG/DL (ref 65–99)
HCT VFR BLD AUTO: 44.8 % (ref 37.5–51)
HGB BLD-MCNC: 15.4 G/DL (ref 13–17.7)
IMM GRANULOCYTES # BLD AUTO: 0.03 10*3/MM3 (ref 0–0.05)
IMM GRANULOCYTES NFR BLD AUTO: 0.4 % (ref 0–0.5)
LYMPHOCYTES # BLD AUTO: 1.98 10*3/MM3 (ref 0.7–3.1)
LYMPHOCYTES NFR BLD AUTO: 29.1 % (ref 19.6–45.3)
MCH RBC QN AUTO: 29.2 PG (ref 26.6–33)
MCHC RBC AUTO-ENTMCNC: 34.4 G/DL (ref 31.5–35.7)
MCV RBC AUTO: 84.8 FL (ref 79–97)
MONOCYTES # BLD AUTO: 0.56 10*3/MM3 (ref 0.1–0.9)
MONOCYTES NFR BLD AUTO: 8.2 % (ref 5–12)
NEUTROPHILS NFR BLD AUTO: 4.09 10*3/MM3 (ref 1.7–7)
NEUTROPHILS NFR BLD AUTO: 60.1 % (ref 42.7–76)
NRBC BLD AUTO-RTO: 0 /100 WBC (ref 0–0.2)
PLATELET # BLD AUTO: 224 10*3/MM3 (ref 140–450)
PMV BLD AUTO: 9.8 FL (ref 6–12)
POTASSIUM SERPL-SCNC: 4.4 MMOL/L (ref 3.5–5.2)
RBC # BLD AUTO: 5.28 10*6/MM3 (ref 4.14–5.8)
SODIUM SERPL-SCNC: 137 MMOL/L (ref 136–145)
WBC # BLD AUTO: 6.81 10*3/MM3 (ref 3.4–10.8)

## 2021-10-22 PROCEDURE — 36415 COLL VENOUS BLD VENIPUNCTURE: CPT

## 2021-10-22 PROCEDURE — 80048 BASIC METABOLIC PNL TOTAL CA: CPT

## 2021-10-22 PROCEDURE — 93005 ELECTROCARDIOGRAM TRACING: CPT | Performed by: NURSE PRACTITIONER

## 2021-10-22 PROCEDURE — 93010 ELECTROCARDIOGRAM REPORT: CPT | Performed by: INTERNAL MEDICINE

## 2021-10-22 PROCEDURE — 85025 COMPLETE CBC W/AUTO DIFF WBC: CPT

## 2021-10-23 LAB — QT INTERVAL: 426 MS

## 2021-10-24 ENCOUNTER — HOSPITAL ENCOUNTER (OUTPATIENT)
Facility: HOSPITAL | Age: 56
Setting detail: OBSERVATION
Discharge: ANOTHER HOSPITAL | End: 2021-10-25
Attending: EMERGENCY MEDICINE | Admitting: HOSPITALIST

## 2021-10-24 ENCOUNTER — APPOINTMENT (OUTPATIENT)
Dept: GENERAL RADIOLOGY | Facility: HOSPITAL | Age: 56
End: 2021-10-24

## 2021-10-24 DIAGNOSIS — R07.2 PRECORDIAL PAIN: Primary | ICD-10-CM

## 2021-10-24 DIAGNOSIS — E11.65 TYPE 2 DIABETES MELLITUS WITH HYPERGLYCEMIA, WITHOUT LONG-TERM CURRENT USE OF INSULIN (HCC): ICD-10-CM

## 2021-10-24 LAB
BASOPHILS # BLD AUTO: 0.02 10*3/MM3 (ref 0–0.2)
BASOPHILS NFR BLD AUTO: 0.2 % (ref 0–1.5)
DEPRECATED RDW RBC AUTO: 41.8 FL (ref 37–54)
EOSINOPHIL # BLD AUTO: 0.16 10*3/MM3 (ref 0–0.4)
EOSINOPHIL NFR BLD AUTO: 1.9 % (ref 0.3–6.2)
ERYTHROCYTE [DISTWIDTH] IN BLOOD BY AUTOMATED COUNT: 13.3 % (ref 12.3–15.4)
HCT VFR BLD AUTO: 45.5 % (ref 37.5–51)
HGB BLD-MCNC: 15 G/DL (ref 13–17.7)
IMM GRANULOCYTES # BLD AUTO: 0.01 10*3/MM3 (ref 0–0.05)
IMM GRANULOCYTES NFR BLD AUTO: 0.1 % (ref 0–0.5)
LYMPHOCYTES # BLD AUTO: 3.27 10*3/MM3 (ref 0.7–3.1)
LYMPHOCYTES NFR BLD AUTO: 38 % (ref 19.6–45.3)
MCH RBC QN AUTO: 28.5 PG (ref 26.6–33)
MCHC RBC AUTO-ENTMCNC: 33 G/DL (ref 31.5–35.7)
MCV RBC AUTO: 86.5 FL (ref 79–97)
MONOCYTES # BLD AUTO: 0.68 10*3/MM3 (ref 0.1–0.9)
MONOCYTES NFR BLD AUTO: 7.9 % (ref 5–12)
NEUTROPHILS NFR BLD AUTO: 4.46 10*3/MM3 (ref 1.7–7)
NEUTROPHILS NFR BLD AUTO: 51.9 % (ref 42.7–76)
NRBC BLD AUTO-RTO: 0 /100 WBC (ref 0–0.2)
PLATELET # BLD AUTO: 228 10*3/MM3 (ref 140–450)
PMV BLD AUTO: 9.3 FL (ref 6–12)
RBC # BLD AUTO: 5.26 10*6/MM3 (ref 4.14–5.8)
WBC # BLD AUTO: 8.6 10*3/MM3 (ref 3.4–10.8)

## 2021-10-24 PROCEDURE — 80053 COMPREHEN METABOLIC PANEL: CPT | Performed by: EMERGENCY MEDICINE

## 2021-10-24 PROCEDURE — 83036 HEMOGLOBIN GLYCOSYLATED A1C: CPT | Performed by: STUDENT IN AN ORGANIZED HEALTH CARE EDUCATION/TRAINING PROGRAM

## 2021-10-24 PROCEDURE — 85025 COMPLETE CBC W/AUTO DIFF WBC: CPT | Performed by: EMERGENCY MEDICINE

## 2021-10-24 PROCEDURE — 93010 ELECTROCARDIOGRAM REPORT: CPT | Performed by: INTERNAL MEDICINE

## 2021-10-24 PROCEDURE — 84100 ASSAY OF PHOSPHORUS: CPT | Performed by: STUDENT IN AN ORGANIZED HEALTH CARE EDUCATION/TRAINING PROGRAM

## 2021-10-24 PROCEDURE — 84484 ASSAY OF TROPONIN QUANT: CPT | Performed by: EMERGENCY MEDICINE

## 2021-10-24 PROCEDURE — 96374 THER/PROPH/DIAG INJ IV PUSH: CPT

## 2021-10-24 PROCEDURE — 83880 ASSAY OF NATRIURETIC PEPTIDE: CPT | Performed by: EMERGENCY MEDICINE

## 2021-10-24 PROCEDURE — 93005 ELECTROCARDIOGRAM TRACING: CPT | Performed by: EMERGENCY MEDICINE

## 2021-10-24 PROCEDURE — 99284 EMERGENCY DEPT VISIT MOD MDM: CPT

## 2021-10-24 PROCEDURE — 99285 EMERGENCY DEPT VISIT HI MDM: CPT | Performed by: EMERGENCY MEDICINE

## 2021-10-24 PROCEDURE — 82550 ASSAY OF CK (CPK): CPT | Performed by: STUDENT IN AN ORGANIZED HEALTH CARE EDUCATION/TRAINING PROGRAM

## 2021-10-24 PROCEDURE — 71045 X-RAY EXAM CHEST 1 VIEW: CPT

## 2021-10-24 PROCEDURE — 25010000002 ONDANSETRON PER 1 MG: Performed by: EMERGENCY MEDICINE

## 2021-10-24 PROCEDURE — 83735 ASSAY OF MAGNESIUM: CPT | Performed by: STUDENT IN AN ORGANIZED HEALTH CARE EDUCATION/TRAINING PROGRAM

## 2021-10-24 PROCEDURE — 82553 CREATINE MB FRACTION: CPT | Performed by: STUDENT IN AN ORGANIZED HEALTH CARE EDUCATION/TRAINING PROGRAM

## 2021-10-24 RX ORDER — ONDANSETRON 2 MG/ML
4 INJECTION INTRAMUSCULAR; INTRAVENOUS ONCE
Status: COMPLETED | OUTPATIENT
Start: 2021-10-24 | End: 2021-10-24

## 2021-10-24 RX ORDER — SODIUM CHLORIDE 0.9 % (FLUSH) 0.9 %
10 SYRINGE (ML) INJECTION AS NEEDED
Status: DISCONTINUED | OUTPATIENT
Start: 2021-10-24 | End: 2021-10-25 | Stop reason: HOSPADM

## 2021-10-24 RX ORDER — NITROGLYCERIN 0.4 MG/1
0.4 TABLET SUBLINGUAL
Status: DISCONTINUED | OUTPATIENT
Start: 2021-10-24 | End: 2021-10-25 | Stop reason: HOSPADM

## 2021-10-24 RX ORDER — ASPIRIN 81 MG/1
324 TABLET, CHEWABLE ORAL ONCE
Status: COMPLETED | OUTPATIENT
Start: 2021-10-24 | End: 2021-10-24

## 2021-10-24 RX ADMIN — NITROGLYCERIN 0.4 MG: 0.4 TABLET SUBLINGUAL at 23:49

## 2021-10-24 RX ADMIN — ASPIRIN 81 MG CHEWABLE TABLET 324 MG: 81 TABLET CHEWABLE at 23:48

## 2021-10-24 RX ADMIN — ONDANSETRON 4 MG: 2 INJECTION INTRAMUSCULAR; INTRAVENOUS at 23:49

## 2021-10-25 ENCOUNTER — APPOINTMENT (OUTPATIENT)
Dept: CARDIOLOGY | Facility: HOSPITAL | Age: 56
End: 2021-10-25

## 2021-10-25 ENCOUNTER — HOSPITAL ENCOUNTER (OUTPATIENT)
Facility: HOSPITAL | Age: 56
Setting detail: HOSPITAL OUTPATIENT SURGERY
Discharge: HOME | End: 2021-10-25
Attending: INTERNAL MEDICINE | Admitting: INTERNAL MEDICINE

## 2021-10-25 VITALS
SYSTOLIC BLOOD PRESSURE: 134 MMHG | HEART RATE: 51 BPM | WEIGHT: 225 LBS | BODY MASS INDEX: 30.48 KG/M2 | HEIGHT: 72 IN | RESPIRATION RATE: 20 BRPM | OXYGEN SATURATION: 94 % | TEMPERATURE: 98.2 F | DIASTOLIC BLOOD PRESSURE: 86 MMHG

## 2021-10-25 VITALS
RESPIRATION RATE: 16 BRPM | WEIGHT: 225 LBS | SYSTOLIC BLOOD PRESSURE: 120 MMHG | DIASTOLIC BLOOD PRESSURE: 73 MMHG | HEIGHT: 72 IN | OXYGEN SATURATION: 96 % | HEART RATE: 49 BPM | TEMPERATURE: 96.6 F | BODY MASS INDEX: 30.48 KG/M2

## 2021-10-25 DIAGNOSIS — R07.2 PRECORDIAL PAIN: Primary | ICD-10-CM

## 2021-10-25 DIAGNOSIS — R07.2 PRECORDIAL PAIN: ICD-10-CM

## 2021-10-25 PROBLEM — Z12.11 COLON CANCER SCREENING: Status: RESOLVED | Noted: 2021-01-14 | Resolved: 2021-10-25

## 2021-10-25 PROBLEM — K62.5 RECTAL BLEEDING: Status: RESOLVED | Noted: 2021-01-14 | Resolved: 2021-10-25

## 2021-10-25 PROBLEM — I95.1 ORTHOSTATIC HYPOTENSION: Status: RESOLVED | Noted: 2020-07-30 | Resolved: 2021-10-25

## 2021-10-25 PROBLEM — R55 SYNCOPE: Status: RESOLVED | Noted: 2020-07-30 | Resolved: 2021-10-25

## 2021-10-25 LAB
ALBUMIN SERPL-MCNC: 4.5 G/DL (ref 3.5–5.2)
ALBUMIN/GLOB SERPL: 1.7 G/DL
ALP SERPL-CCNC: 75 U/L (ref 39–117)
ALT SERPL W P-5'-P-CCNC: 37 U/L (ref 1–41)
ANION GAP SERPL CALCULATED.3IONS-SCNC: 10.6 MMOL/L (ref 5–15)
ANION GAP SERPL CALCULATED.3IONS-SCNC: 5 MMOL/L (ref 5–15)
AORTIC DIMENSIONLESS INDEX: 0.7 (DI)
AST SERPL-CCNC: 22 U/L (ref 1–40)
BH CV ECHO MEAS - AO MAX PG (FULL): 3 MMHG
BH CV ECHO MEAS - AO MAX PG: 12.4 MMHG
BH CV ECHO MEAS - AO MEAN PG (FULL): 2.3 MMHG
BH CV ECHO MEAS - AO MEAN PG: 6.3 MMHG
BH CV ECHO MEAS - AO ROOT AREA (BSA CORRECTED): 1.7
BH CV ECHO MEAS - AO ROOT AREA: 10.8 CM^2
BH CV ECHO MEAS - AO ROOT DIAM: 3.7 CM
BH CV ECHO MEAS - AO V2 MAX: 176 CM/SEC
BH CV ECHO MEAS - AO V2 MEAN: 120.7 CM/SEC
BH CV ECHO MEAS - AO V2 VTI: 37.2 CM
BH CV ECHO MEAS - ASC AORTA: 3.7 CM
BH CV ECHO MEAS - AVA(I,A): 3.3 CM^2
BH CV ECHO MEAS - AVA(I,D): 3.3 CM^2
BH CV ECHO MEAS - AVA(V,A): 3.6 CM^2
BH CV ECHO MEAS - AVA(V,D): 3.6 CM^2
BH CV ECHO MEAS - BSA(HAYCOCK): 2.3 M^2
BH CV ECHO MEAS - BSA: 2.2 M^2
BH CV ECHO MEAS - BZI_BMI: 30.8 KILOGRAMS/M^2
BH CV ECHO MEAS - BZI_METRIC_HEIGHT: 182 CM
BH CV ECHO MEAS - BZI_METRIC_WEIGHT: 102 KG
BH CV ECHO MEAS - EDV(CUBED): 105.8 ML
BH CV ECHO MEAS - EDV(MOD-SP2): 158 ML
BH CV ECHO MEAS - EDV(MOD-SP4): 149 ML
BH CV ECHO MEAS - EDV(TEICH): 103.9 ML
BH CV ECHO MEAS - EF(CUBED): 66 %
BH CV ECHO MEAS - EF(MOD-BP): 65.5 %
BH CV ECHO MEAS - EF(MOD-SP2): 65.4 %
BH CV ECHO MEAS - EF(MOD-SP4): 68.4 %
BH CV ECHO MEAS - EF(TEICH): 57.5 %
BH CV ECHO MEAS - ESV(CUBED): 35.9 ML
BH CV ECHO MEAS - ESV(MOD-SP2): 54.6 ML
BH CV ECHO MEAS - ESV(MOD-SP4): 47.1 ML
BH CV ECHO MEAS - ESV(TEICH): 44.1 ML
BH CV ECHO MEAS - FS: 30.2 %
BH CV ECHO MEAS - IVS/LVPW: 1
BH CV ECHO MEAS - IVSD: 1.3 CM
BH CV ECHO MEAS - LAT PEAK E' VEL: 9.1 CM/SEC
BH CV ECHO MEAS - LV DIASTOLIC VOL/BSA (35-75): 66.8 ML/M^2
BH CV ECHO MEAS - LV MASS(C)D: 242.9 GRAMS
BH CV ECHO MEAS - LV MASS(C)DI: 108.9 GRAMS/M^2
BH CV ECHO MEAS - LV MAX PG: 9.4 MMHG
BH CV ECHO MEAS - LV MEAN PG: 4 MMHG
BH CV ECHO MEAS - LV SYSTOLIC VOL/BSA (12-30): 21.1 ML/M^2
BH CV ECHO MEAS - LV V1 MAX: 151.7 CM/SEC
BH CV ECHO MEAS - LV V1 MEAN: 91.7 CM/SEC
BH CV ECHO MEAS - LV V1 VTI: 29.7 CM
BH CV ECHO MEAS - LVIDD: 4.7 CM
BH CV ECHO MEAS - LVIDS: 3.3 CM
BH CV ECHO MEAS - LVLD AP2: 8.4 CM
BH CV ECHO MEAS - LVLD AP4: 9.5 CM
BH CV ECHO MEAS - LVLS AP2: 6.3 CM
BH CV ECHO MEAS - LVLS AP4: 7.8 CM
BH CV ECHO MEAS - LVOT AREA (M): 4.2 CM^2
BH CV ECHO MEAS - LVOT AREA: 4.2 CM^2
BH CV ECHO MEAS - LVOT DIAM: 2.3 CM
BH CV ECHO MEAS - LVPWD: 1.3 CM
BH CV ECHO MEAS - MED PEAK E' VEL: 7.7 CM/SEC
BH CV ECHO MEAS - MV A MAX VEL: 68.1 CM/SEC
BH CV ECHO MEAS - MV DEC SLOPE: 350 CM/SEC^2
BH CV ECHO MEAS - MV DEC TIME: 235 SEC
BH CV ECHO MEAS - MV E MAX VEL: 75.4 CM/SEC
BH CV ECHO MEAS - MV E/A: 1.1
BH CV ECHO MEAS - MV MEAN PG: 1 MMHG
BH CV ECHO MEAS - MV P1/2T MAX VEL: 85.4 CM/SEC
BH CV ECHO MEAS - MV P1/2T: 71.5 MSEC
BH CV ECHO MEAS - MV V2 MEAN: 36.6 CM/SEC
BH CV ECHO MEAS - MV V2 VTI: 31.5 CM
BH CV ECHO MEAS - MVA P1/2T LCG: 2.6 CM^2
BH CV ECHO MEAS - MVA(P1/2T): 3.1 CM^2
BH CV ECHO MEAS - MVA(VTI): 3.9 CM^2
BH CV ECHO MEAS - PA ACC TIME: 0.07 SEC
BH CV ECHO MEAS - PA MAX PG: 4.1 MMHG
BH CV ECHO MEAS - PA PR(ACCEL): 45.7 MMHG
BH CV ECHO MEAS - PA V2 MAX: 101 CM/SEC
BH CV ECHO MEAS - QP/QS: 0.87
BH CV ECHO MEAS - RAP SYSTOLE: 8 MMHG
BH CV ECHO MEAS - RV MEAN PG: 1 MMHG
BH CV ECHO MEAS - RV V1 MEAN: 44.3 CM/SEC
BH CV ECHO MEAS - RV V1 VTI: 16.2 CM
BH CV ECHO MEAS - RVOT AREA: 6.6 CM^2
BH CV ECHO MEAS - RVOT DIAM: 2.9 CM
BH CV ECHO MEAS - SI(AO): 179.4 ML/M^2
BH CV ECHO MEAS - SI(CUBED): 31.3 ML/M^2
BH CV ECHO MEAS - SI(LVOT): 55.3 ML/M^2
BH CV ECHO MEAS - SI(MOD-SP2): 46.3 ML/M^2
BH CV ECHO MEAS - SI(MOD-SP4): 45.7 ML/M^2
BH CV ECHO MEAS - SI(TEICH): 26.8 ML/M^2
BH CV ECHO MEAS - SV(AO): 400.3 ML
BH CV ECHO MEAS - SV(CUBED): 69.9 ML
BH CV ECHO MEAS - SV(LVOT): 123.4 ML
BH CV ECHO MEAS - SV(MOD-SP2): 103.4 ML
BH CV ECHO MEAS - SV(MOD-SP4): 101.9 ML
BH CV ECHO MEAS - SV(RVOT): 107 ML
BH CV ECHO MEAS - SV(TEICH): 59.8 ML
BH CV ECHO MEAS - TAPSE (>1.6): 2.8 CM
BH CV ECHO MEASUREMENTS AVERAGE E/E' RATIO: 8.98
BH CV XLRA - RV BASE: 3.8 CM
BH CV XLRA - RV LENGTH: 8.6 CM
BH CV XLRA - RV MID: 2.6 CM
BH CV XLRA - TDI S': 12.9 CM/SEC
BILIRUB SERPL-MCNC: 1.3 MG/DL (ref 0–1.2)
BUN SERPL-MCNC: 15 MG/DL (ref 6–20)
BUN SERPL-MCNC: 22 MG/DL (ref 6–20)
BUN/CREAT SERPL: 13.3 (ref 7–25)
BUN/CREAT SERPL: 23.2 (ref 7–25)
CALCIUM SPEC-SCNC: 10 MG/DL (ref 8.6–10.5)
CALCIUM SPEC-SCNC: 9.8 MG/DL (ref 8.6–10.5)
CHLORIDE SERPL-SCNC: 101 MMOL/L (ref 98–107)
CHLORIDE SERPL-SCNC: 99 MMOL/L (ref 98–107)
CHOLEST SERPL-MCNC: 224 MG/DL (ref 0–200)
CK MB SERPL-CCNC: 1.58 NG/ML
CK SERPL-CCNC: 76 U/L (ref 20–200)
CO2 SERPL-SCNC: 29.4 MMOL/L (ref 22–29)
CO2 SERPL-SCNC: 32 MMOL/L (ref 22–29)
CREAT SERPL-MCNC: 0.95 MG/DL (ref 0.76–1.27)
CREAT SERPL-MCNC: 1.13 MG/DL (ref 0.76–1.27)
DEPRECATED RDW RBC AUTO: 42 FL (ref 37–54)
ERYTHROCYTE [DISTWIDTH] IN BLOOD BY AUTOMATED COUNT: 13.2 % (ref 12.3–15.4)
GFR SERPL CREATININE-BSD FRML MDRD: 67 ML/MIN/1.73
GFR SERPL CREATININE-BSD FRML MDRD: 82 ML/MIN/1.73
GLOBULIN UR ELPH-MCNC: 2.7 GM/DL
GLUCOSE BLDC GLUCOMTR-MCNC: 223 MG/DL (ref 70–130)
GLUCOSE SERPL-MCNC: 180 MG/DL (ref 65–99)
GLUCOSE SERPL-MCNC: 224 MG/DL (ref 65–99)
HBA1C MFR BLD: 11.7 % (ref 4.8–5.6)
HCT VFR BLD AUTO: 42.7 % (ref 37.5–51)
HDLC SERPL-MCNC: 36 MG/DL (ref 40–60)
HGB BLD-MCNC: 14.2 G/DL (ref 13–17.7)
HOLD SPECIMEN: NORMAL
HOLD SPECIMEN: NORMAL
LDLC SERPL CALC-MCNC: 98 MG/DL (ref 0–100)
LDLC/HDLC SERPL: 2.24 {RATIO}
LEFT ATRIUM VOLUME INDEX: 27.4 ML/M2
MAGNESIUM SERPL-MCNC: 1.9 MG/DL (ref 1.6–2.6)
MCH RBC QN AUTO: 29 PG (ref 26.6–33)
MCHC RBC AUTO-ENTMCNC: 33.3 G/DL (ref 31.5–35.7)
MCV RBC AUTO: 87.1 FL (ref 79–97)
NT-PROBNP SERPL-MCNC: 135.4 PG/ML (ref 0–900)
PHOSPHATE SERPL-MCNC: 3.4 MG/DL (ref 2.5–4.5)
PLATELET # BLD AUTO: 199 10*3/MM3 (ref 140–450)
PMV BLD AUTO: 9.8 FL (ref 6–12)
POTASSIUM SERPL-SCNC: 3.7 MMOL/L (ref 3.5–5.2)
POTASSIUM SERPL-SCNC: 4 MMOL/L (ref 3.5–5.2)
PROT SERPL-MCNC: 7.2 G/DL (ref 6–8.5)
QT INTERVAL: 405 MS
QT INTERVAL: 442 MS
RBC # BLD AUTO: 4.9 10*6/MM3 (ref 4.14–5.8)
SARS-COV-2 RNA PNL SPEC NAA+PROBE: NOT DETECTED
SINUS: 3.7 CM
SODIUM SERPL-SCNC: 138 MMOL/L (ref 136–145)
SODIUM SERPL-SCNC: 139 MMOL/L (ref 136–145)
TRIGL SERPL-MCNC: 536 MG/DL (ref 0–150)
TROPONIN T SERPL-MCNC: <0.01 NG/ML (ref 0–0.03)
TROPONIN T SERPL-MCNC: <0.01 NG/ML (ref 0–0.03)
VLDLC SERPL-MCNC: 90 MG/DL (ref 5–40)
WBC # BLD AUTO: 8.43 10*3/MM3 (ref 3.4–10.8)
WHOLE BLOOD HOLD SPECIMEN: NORMAL
WHOLE BLOOD HOLD SPECIMEN: NORMAL

## 2021-10-25 PROCEDURE — 96372 THER/PROPH/DIAG INJ SC/IM: CPT

## 2021-10-25 PROCEDURE — 87635 SARS-COV-2 COVID-19 AMP PRB: CPT | Performed by: EMERGENCY MEDICINE

## 2021-10-25 PROCEDURE — 99235 HOSP IP/OBS SAME DATE MOD 70: CPT | Performed by: NURSE PRACTITIONER

## 2021-10-25 PROCEDURE — 99152 MOD SED SAME PHYS/QHP 5/>YRS: CPT | Performed by: INTERNAL MEDICINE

## 2021-10-25 PROCEDURE — 99204 OFFICE O/P NEW MOD 45 MIN: CPT | Performed by: INTERNAL MEDICINE

## 2021-10-25 PROCEDURE — 25010000002 MIDAZOLAM PER 1 MG: Performed by: INTERNAL MEDICINE

## 2021-10-25 PROCEDURE — 25010000002 FENTANYL CITRATE (PF) 50 MCG/ML SOLUTION: Performed by: INTERNAL MEDICINE

## 2021-10-25 PROCEDURE — 93010 ELECTROCARDIOGRAM REPORT: CPT | Performed by: INTERNAL MEDICINE

## 2021-10-25 PROCEDURE — 25010000002 ENOXAPARIN PER 10 MG: Performed by: EMERGENCY MEDICINE

## 2021-10-25 PROCEDURE — 25010000002 HEPARIN (PORCINE) PER 1000 UNITS: Performed by: INTERNAL MEDICINE

## 2021-10-25 PROCEDURE — 84484 ASSAY OF TROPONIN QUANT: CPT | Performed by: STUDENT IN AN ORGANIZED HEALTH CARE EDUCATION/TRAINING PROGRAM

## 2021-10-25 PROCEDURE — C1894 INTRO/SHEATH, NON-LASER: HCPCS | Performed by: INTERNAL MEDICINE

## 2021-10-25 PROCEDURE — 25010000002 DIPHENHYDRAMINE PER 50 MG: Performed by: INTERNAL MEDICINE

## 2021-10-25 PROCEDURE — 93306 TTE W/DOPPLER COMPLETE: CPT | Performed by: INTERNAL MEDICINE

## 2021-10-25 PROCEDURE — G0378 HOSPITAL OBSERVATION PER HR: HCPCS

## 2021-10-25 PROCEDURE — 82962 GLUCOSE BLOOD TEST: CPT

## 2021-10-25 PROCEDURE — 87040 BLOOD CULTURE FOR BACTERIA: CPT | Performed by: INTERNAL MEDICINE

## 2021-10-25 PROCEDURE — 0 IOPAMIDOL PER 1 ML: Performed by: INTERNAL MEDICINE

## 2021-10-25 PROCEDURE — 93458 L HRT ARTERY/VENTRICLE ANGIO: CPT | Performed by: INTERNAL MEDICINE

## 2021-10-25 PROCEDURE — 25010000002 PERFLUTREN (DEFINITY) 8.476 MG IN SODIUM CHLORIDE (PF) 0.9 % 10 ML INJECTION: Performed by: STUDENT IN AN ORGANIZED HEALTH CARE EDUCATION/TRAINING PROGRAM

## 2021-10-25 PROCEDURE — C1769 GUIDE WIRE: HCPCS | Performed by: INTERNAL MEDICINE

## 2021-10-25 PROCEDURE — 85027 COMPLETE CBC AUTOMATED: CPT | Performed by: STUDENT IN AN ORGANIZED HEALTH CARE EDUCATION/TRAINING PROGRAM

## 2021-10-25 PROCEDURE — 25010000002 METHYLPREDNISOLONE PER 125 MG: Performed by: INTERNAL MEDICINE

## 2021-10-25 PROCEDURE — 93306 TTE W/DOPPLER COMPLETE: CPT

## 2021-10-25 PROCEDURE — 80061 LIPID PANEL: CPT | Performed by: NURSE PRACTITIONER

## 2021-10-25 PROCEDURE — 94799 UNLISTED PULMONARY SVC/PX: CPT

## 2021-10-25 PROCEDURE — 80048 BASIC METABOLIC PNL TOTAL CA: CPT | Performed by: STUDENT IN AN ORGANIZED HEALTH CARE EDUCATION/TRAINING PROGRAM

## 2021-10-25 PROCEDURE — 93005 ELECTROCARDIOGRAM TRACING: CPT | Performed by: STUDENT IN AN ORGANIZED HEALTH CARE EDUCATION/TRAINING PROGRAM

## 2021-10-25 RX ORDER — DEXTROSE MONOHYDRATE 25 G/50ML
25 INJECTION, SOLUTION INTRAVENOUS
Status: CANCELLED | OUTPATIENT
Start: 2021-10-25

## 2021-10-25 RX ORDER — ALLOPURINOL 300 MG/1
300 TABLET ORAL DAILY
COMMUNITY

## 2021-10-25 RX ORDER — OXYCODONE AND ACETAMINOPHEN 10; 325 MG/1; MG/1
1 TABLET ORAL ONCE AS NEEDED
Status: COMPLETED | OUTPATIENT
Start: 2021-10-25 | End: 2021-10-25

## 2021-10-25 RX ORDER — ASPIRIN 81 MG/1
81 TABLET ORAL DAILY
Status: DISCONTINUED | OUTPATIENT
Start: 2021-10-25 | End: 2021-10-25 | Stop reason: HOSPADM

## 2021-10-25 RX ORDER — SODIUM CHLORIDE 0.9 % (FLUSH) 0.9 %
10 SYRINGE (ML) INJECTION EVERY 12 HOURS SCHEDULED
Status: DISCONTINUED | OUTPATIENT
Start: 2021-10-25 | End: 2021-10-25 | Stop reason: HOSPADM

## 2021-10-25 RX ORDER — PREGABALIN 75 MG/1
150 CAPSULE ORAL EVERY 12 HOURS SCHEDULED
Status: DISCONTINUED | OUTPATIENT
Start: 2021-10-26 | End: 2021-10-25 | Stop reason: HOSPADM

## 2021-10-25 RX ORDER — LISINOPRIL 2.5 MG/1
2.5 TABLET ORAL DAILY
Status: DISCONTINUED | OUTPATIENT
Start: 2021-10-25 | End: 2021-10-25

## 2021-10-25 RX ORDER — NICOTINE POLACRILEX 4 MG
15 LOZENGE BUCCAL
Status: CANCELLED | OUTPATIENT
Start: 2021-10-25

## 2021-10-25 RX ORDER — NICOTINE POLACRILEX 4 MG
15 LOZENGE BUCCAL
Status: DISCONTINUED | OUTPATIENT
Start: 2021-10-25 | End: 2021-10-25 | Stop reason: HOSPADM

## 2021-10-25 RX ORDER — ASPIRIN 81 MG/1
81 TABLET ORAL DAILY
Status: CANCELLED | OUTPATIENT
Start: 2021-10-26

## 2021-10-25 RX ORDER — DULOXETIN HYDROCHLORIDE 60 MG/1
60 CAPSULE, DELAYED RELEASE ORAL NIGHTLY
Status: DISCONTINUED | OUTPATIENT
Start: 2021-10-25 | End: 2021-10-25 | Stop reason: HOSPADM

## 2021-10-25 RX ORDER — FENTANYL CITRATE 50 UG/ML
INJECTION, SOLUTION INTRAMUSCULAR; INTRAVENOUS AS NEEDED
Status: DISCONTINUED | OUTPATIENT
Start: 2021-10-25 | End: 2021-10-25 | Stop reason: HOSPADM

## 2021-10-25 RX ORDER — ACETAMINOPHEN 325 MG/1
650 TABLET ORAL EVERY 4 HOURS PRN
Status: DISCONTINUED | OUTPATIENT
Start: 2021-10-25 | End: 2021-10-25 | Stop reason: HOSPADM

## 2021-10-25 RX ORDER — SODIUM CHLORIDE 0.9 % (FLUSH) 0.9 %
10 SYRINGE (ML) INJECTION AS NEEDED
Status: DISCONTINUED | OUTPATIENT
Start: 2021-10-25 | End: 2021-10-25 | Stop reason: HOSPADM

## 2021-10-25 RX ORDER — METHYLPREDNISOLONE SODIUM SUCCINATE 125 MG/2ML
125 INJECTION, POWDER, LYOPHILIZED, FOR SOLUTION INTRAMUSCULAR; INTRAVENOUS ONCE
Status: COMPLETED | OUTPATIENT
Start: 2021-10-25 | End: 2021-10-25

## 2021-10-25 RX ORDER — OXYCODONE HYDROCHLORIDE AND ACETAMINOPHEN 5; 325 MG/1; MG/1
2 TABLET ORAL ONCE
Status: COMPLETED | OUTPATIENT
Start: 2021-10-25 | End: 2021-10-25

## 2021-10-25 RX ORDER — DULOXETIN HYDROCHLORIDE 60 MG/1
60 CAPSULE, DELAYED RELEASE ORAL NIGHTLY
Status: CANCELLED | OUTPATIENT
Start: 2021-10-25

## 2021-10-25 RX ORDER — LISINOPRIL 2.5 MG/1
2.5 TABLET ORAL NIGHTLY
Status: DISCONTINUED | OUTPATIENT
Start: 2021-10-25 | End: 2021-10-25 | Stop reason: HOSPADM

## 2021-10-25 RX ORDER — PREGABALIN 75 MG/1
150 CAPSULE ORAL EVERY 12 HOURS SCHEDULED
Status: CANCELLED | OUTPATIENT
Start: 2021-10-26

## 2021-10-25 RX ORDER — NITROGLYCERIN 0.4 MG/1
0.4 TABLET SUBLINGUAL
Status: CANCELLED | OUTPATIENT
Start: 2021-10-25

## 2021-10-25 RX ORDER — DULOXETIN HYDROCHLORIDE 60 MG/1
60 CAPSULE, DELAYED RELEASE ORAL DAILY
Status: DISCONTINUED | OUTPATIENT
Start: 2021-10-25 | End: 2021-10-25

## 2021-10-25 RX ORDER — LIDOCAINE HYDROCHLORIDE 20 MG/ML
INJECTION, SOLUTION INFILTRATION; PERINEURAL AS NEEDED
Status: DISCONTINUED | OUTPATIENT
Start: 2021-10-25 | End: 2021-10-25 | Stop reason: HOSPADM

## 2021-10-25 RX ORDER — MIDAZOLAM HYDROCHLORIDE 1 MG/ML
INJECTION INTRAMUSCULAR; INTRAVENOUS AS NEEDED
Status: DISCONTINUED | OUTPATIENT
Start: 2021-10-25 | End: 2021-10-25 | Stop reason: HOSPADM

## 2021-10-25 RX ORDER — SODIUM CHLORIDE 0.9 % (FLUSH) 0.9 %
10 SYRINGE (ML) INJECTION AS NEEDED
Status: CANCELLED | OUTPATIENT
Start: 2021-10-25

## 2021-10-25 RX ORDER — AMLODIPINE BESYLATE 2.5 MG/1
2.5 TABLET ORAL DAILY
COMMUNITY
End: 2021-11-16 | Stop reason: SDUPTHER

## 2021-10-25 RX ORDER — ATORVASTATIN CALCIUM 10 MG/1
10 TABLET, FILM COATED ORAL DAILY
COMMUNITY

## 2021-10-25 RX ORDER — LISINOPRIL 2.5 MG/1
2.5 TABLET ORAL NIGHTLY
Status: CANCELLED | OUTPATIENT
Start: 2021-10-25

## 2021-10-25 RX ORDER — DIPHENHYDRAMINE HYDROCHLORIDE 50 MG/ML
50 INJECTION INTRAMUSCULAR; INTRAVENOUS ONCE
Status: COMPLETED | OUTPATIENT
Start: 2021-10-25 | End: 2021-10-25

## 2021-10-25 RX ORDER — SODIUM CHLORIDE 9 MG/ML
75 INJECTION, SOLUTION INTRAVENOUS CONTINUOUS
Status: DISCONTINUED | OUTPATIENT
Start: 2021-10-25 | End: 2021-10-25 | Stop reason: HOSPADM

## 2021-10-25 RX ORDER — DEXTROSE MONOHYDRATE 25 G/50ML
25 INJECTION, SOLUTION INTRAVENOUS
Status: DISCONTINUED | OUTPATIENT
Start: 2021-10-25 | End: 2021-10-25 | Stop reason: HOSPADM

## 2021-10-25 RX ADMIN — ENOXAPARIN SODIUM 100 MG: 100 INJECTION SUBCUTANEOUS at 00:40

## 2021-10-25 RX ADMIN — DIPHENHYDRAMINE HYDROCHLORIDE 50 MG: 50 INJECTION, SOLUTION INTRAMUSCULAR; INTRAVENOUS at 12:20

## 2021-10-25 RX ADMIN — SODIUM CHLORIDE 75 ML/HR: 9 INJECTION, SOLUTION INTRAVENOUS at 11:59

## 2021-10-25 RX ADMIN — METHYLPREDNISOLONE SODIUM SUCCINATE 125 MG: 125 INJECTION, POWDER, FOR SOLUTION INTRAMUSCULAR; INTRAVENOUS at 12:17

## 2021-10-25 RX ADMIN — OXYCODONE HYDROCHLORIDE AND ACETAMINOPHEN 1 TABLET: 10; 325 TABLET ORAL at 09:13

## 2021-10-25 RX ADMIN — NITROGLYCERIN 1 INCH: 20 OINTMENT TOPICAL at 00:01

## 2021-10-25 RX ADMIN — SODIUM CHLORIDE 2 ML: 9 INJECTION INTRAMUSCULAR; INTRAVENOUS; SUBCUTANEOUS at 08:17

## 2021-10-25 RX ADMIN — ASPIRIN 81 MG: 81 TABLET, COATED ORAL at 09:13

## 2021-10-25 RX ADMIN — OXYCODONE HYDROCHLORIDE AND ACETAMINOPHEN 2 TABLET: 5; 325 TABLET ORAL at 00:39

## 2021-10-25 NOTE — PLAN OF CARE
Goal Outcome Evaluation:  Plan of Care Reviewed With: patient        Progress: improving  Outcome Summary: Admitted to unit this shift. No complaints of chests pain or discomfort.VSS. waffle mattress placed on bed for comfort related to history of back surgery. Remains on room air. Hopes to go home today. Remains sinus constantino on telemetry.

## 2021-10-25 NOTE — CONSULTS
Date of Hospital Visit: 10/25/21  Encounter Provider: Dick Holly MD  Place of Service: [facility]  Patient Name: Flaco Miles Sr.  :1965  Referral Provider: Dr Wallace    Chief complaint: CP    History of Present Illness    Mr. Miles is a 56-year-old man with poorly controlled diabetes, hypertension, hyperlipidemia, a strong family history of premature coronary disease, and ongoing nicotine abuse who presents with chest pain.    He has chronic pain, which does limit him to an extent, but he does like to go out and do things like hunt.  Over the last few weeks, he has been profoundly dyspneic with exertion.  He has not had issues at rest.  Yesterday, while at rest, he developed severe midsternal pressure and heaviness associated with diaphoresis, shortness of breath, and nausea.  He called his son, who brought him to the emergency department, where he was given sublingual nitroglycerin.  This relieved his pain from a 9 out of 10 to a 1.    He has urinary incontinence and is scheduled for a nerve stimulator.  This is to be performed on .  Other than that, he has no surgeries planned, and he denies easy bleeding.    Past Medical History:   Diagnosis Date   • Anesthesia complication     WAKES VIOLENTLY.  UNKNOWN REASON   • Arthrofibrosis of knee joint, left    • Constipation    • DDD (degenerative disc disease), cervical    • DJD (degenerative joint disease)    • Gout    • History of colon polyps    • History of kidney stones    • Hyperlipidemia    • Hypertension    • Left knee pain    • Limited range of motion (ROM) of shoulder     BOTH SHOULDERS   • Migraines    • Prostate cancer (HCC) 2017    SEED IMPLANTS   • Rotator cuff tear, right    • Sleep apnea     NO MACHINE   • Type 2 diabetes mellitus (HCC)        Past Surgical History:   Procedure Laterality Date   • ABDOMINAL HERNIA REPAIR      X4   • ADENOIDECTOMY     • ANKLE ARTHROPLASTY Left     Piece  of glass removed and cut a tendon    • BACK SURGERY      X4   • CERVICAL FUSION  2015   • CHOLECYSTECTOMY     • COLONOSCOPY N/A 2/16/2021    Procedure: COLONOSCOPY to cecum and TI:  cold biopsy polypectomy, not snare polypectomy, cold snare polypectomies,;  Surgeon: Elenita Kaba MD;  Location: [facility] END[facility];  Service: Gastroenterology;  Laterality: N/A;  pre:  rectal bleeding and screening  post:  polyps,    • ELBOW ARTHROPLASTY Right     Tendon and artery repair from traumatic wound    • FRACTURE SURGERY     • INGUINAL HERNIA REPAIR Left     X2   • JOINT MANIPULATION Left 4/16/2018    Procedure: LEFT KNEE MANIPULATION;  Surgeon: Vinod Crooks MD;  Location: [facility] MAIN OR;  Service: Orthopedics   • KIDNEY STONE SURGERY      MULITPLE SURGERIES   • KNEE ARTHROPLASTY Left 2018   • KNEE ARTHROPLASTY, PARTIAL REPLACEMENT Left 1989   • KNEE ARTHROSCOPY Right 1980'S  1990'S    X 2   • KNEE SURGERY Left     X8   • LEG SURGERY      from spider bites   • NECK SURGERY      x 3 (1198, 2003, 20125). Plated and fused   • PROSTATE RADIOACTIVE SEED IMPLANT  01/2017   • PROSTATE SURGERY     • SHOULDER ARTHROSCOPY Right    • SHOULDER ARTHROSCOPY W/ ROTATOR CUFF REPAIR Right 2/24/2021    Procedure: RIGHT SHOULDER ARTHROSCOPY WITH ROTATOR CUFF REPAIR AND ACROMIOPLASTY WITH DEBRIDEMENT  FOLLOWED BY OPEN DISTAL CLAVICLE EXCISION INTRASCALEN BLOCK;  Surgeon: Ramon Sun MD;  Location: [facility] OR [facility];  Service: Orthopedics;  Laterality: Right;   • TESTICLE SURGERY Right     Hydrocele repair    • TONSILLECTOMY     • TOTAL KNEE ARTHROPLASTY Left 3/5/2018    Procedure: LEFT TOTAL KNEE ARTHROPLASTY;  Surgeon: Vinod Crooks MD;  Location: [facility] MAIN OR;  Service:    • TOTAL KNEE ARTHROPLASTY REVISION Left 2003       Prior to Admission medications    Medication Sig Start Date End Date Taking? Authorizing Provider   allopurinol (ZYLOPRIM) 300 MG tablet Take 300 mg by mouth Daily.   Yes Provider, MD Thomas   amLODIPine  (NORVASC) 2.5 MG tablet Take 2.5 mg by mouth Daily.   Yes Thomas Walters MD   atorvastatin (LIPITOR) 10 MG tablet Take 10 mg by mouth Daily.   Yes Thomas Walters MD   DULoxetine (CYMBALTA) 60 MG capsule  21  Yes Thomas Walters MD   linagliptin (TRADJENTA) 5 MG tablet tablet Take 1 tablet by mouth Daily. 9/10/21  Yes Frank Zambrano Jr., DO   lisinopril (PRINIVIL,ZESTRIL) 2.5 MG tablet Take 1 tablet by mouth Daily.  Patient taking differently: Take 2.5 mg by mouth Every Morning. 20  Yes Frank Zambrano Jr., DO   oxyCODONE-acetaminophen (PERCOCET)  MG per tablet Take 0.5-1 tablets by mouth Every 8 (Eight) Hours As Needed for Severe Pain .  Patient taking differently: Take 1 tablet by mouth Every 6 (Six) Hours As Needed for Severe Pain . 18  Yes Elenita Wray MD   pregabalin (Lyrica) 150 MG capsule Take 150 mg by mouth 2 (Two) Times a Day.   Yes ProviderThomas MD   tiZANidine (ZANAFLEX) 4 MG tablet Take 1 tablet by mouth 2 (Two) Times a Day As Needed for Muscle Spasms. 10/29/19  Yes Elenita Wray MD       Social History     Socioeconomic History   • Marital status:    Tobacco Use   • Smoking status: Former Smoker     Packs/day: 1.00     Types: Pipe, Cigarettes     Quit date:      Years since quittin.8   • Smokeless tobacco: Current User     Types: Snuff   • Tobacco comment: quit 2020   Vaping Use   • Vaping Use: Never used   Substance and Sexual Activity   • Alcohol use: Never   • Drug use: No   • Sexual activity: Defer       Family History   Problem Relation Age of Onset   • Stroke Mother    • Hypertension Mother    • Alzheimer's disease Father    • Dementia Father    • Hypertension Father    • Alcohol abuse Sister    • Hypertension Sister    • No Known Problems Brother    • Malig Hyperthermia Neg Hx    • Colon cancer Neg Hx    • Colon polyps Neg Hx        Review of Systems   Constitutional: Positive for fatigue.   Respiratory: Positive  "for shortness of breath.    Cardiovascular: Positive for chest pain.   Genitourinary: Positive for urgency.   Musculoskeletal: Positive for arthralgias and back pain.   All other systems reviewed and are negative.       Objective:     Vitals:    10/25/21 0330 10/25/21 0500 10/25/21 0817 10/25/21 1016   BP:  101/54 101/54 134/86   BP Location:  Right arm  Right arm   Patient Position:  Lying  Lying   Pulse:  58  51   Resp:  18  20   Temp:  98 °F (36.7 °C)  98.2 °F (36.8 °C)   TempSrc:  Oral  Oral   SpO2: 94% 90%  94%   Weight:   102 kg (225 lb)    Height:   182.9 cm (72\")      Body mass index is 30.52 kg/m².    Last Weight and Admission Weight        10/25/21  0817   Weight: 102 kg (225 lb)     Flowsheet Rows      First Filed Value   Admission Height 182.9 cm (72\") Documented at 10/25/2021 0229   Admission Weight 102 kg (225 lb) Documented at 10/24/2021 2336          No intake or output data in the 24 hours ending 10/25/21 1057      Physical Exam  Vitals reviewed.   Constitutional:       Appearance: He is well-developed.   HENT:      Head: Normocephalic.      Comments: Large beard     Nose: Nose normal.   Eyes:      Conjunctiva/sclera: Conjunctivae normal.   Neck:      Vascular: No JVD.   Cardiovascular:      Rate and Rhythm: Normal rate and regular rhythm.      Pulses: Normal pulses.      Heart sounds: Normal heart sounds.   Pulmonary:      Effort: Pulmonary effort is normal.      Breath sounds: Normal breath sounds.   Abdominal:      Palpations: Abdomen is soft. There is no mass.   Musculoskeletal:         General: No swelling. Normal range of motion.      Cervical back: Normal range of motion.   Skin:     General: Skin is warm and dry.      Findings: No erythema.   Neurological:      Mental Status: He is alert and oriented to person, place, and time.      Cranial Nerves: No cranial nerve deficit.   Psychiatric:         Mood and Affect: Mood normal.         Behavior: Behavior normal.         Thought Content: " Thought content normal.                 Lab Review:                Results from last 7 days   Lab Units 10/25/21  0416   SODIUM mmol/L 138   POTASSIUM mmol/L 4.0   CHLORIDE mmol/L 101   CO2 mmol/L 32.0*   BUN mg/dL 22*   CREATININE mg/dL 0.95   GLUCOSE mg/dL 224*   CALCIUM mg/dL 9.8     Results from last 7 days   Lab Units 10/25/21  0416 10/24/21  2341   TROPONIN T ng/mL <0.010 <0.010     Results from last 7 days   Lab Units 10/25/21  0416   WBC 10*3/mm3 8.43   HEMOGLOBIN g/dL 14.2   HEMATOCRIT % 42.7   PLATELETS 10*3/mm3 199         Results from last 7 days   Lab Units 10/25/21  0416   CHOLESTEROL mg/dL 224*     Results from last 7 days   Lab Units 10/24/21  2341   MAGNESIUM mg/dL 1.9     Results from last 7 days   Lab Units 10/25/21  0416   CHOLESTEROL mg/dL 224*   TRIGLYCERIDES mg/dL 536*   HDL CHOL mg/dL 36*   LDL CHOL mg/dL 98         I personally viewed and interpreted the patient's EKG/Telemetry data -- SR, NSST abnormality, slightly more pronounced than on prior EKG    Assessment/Plan:     1. Chest pain  2. KENDRICK  3. DM2  4. HTN  5. Hyperlipidemia  6. Nicotine dependence (dipping)  7. Urinary incontinence    Mr. Miles is a very high risk patient who developed anginal sounding discomfort yesterday while at rest.  Given the duration and intensity of his discomfort, I would expect him to have a positive troponin, but surprisingly, it is normal.  His EKG shows some nonspecific T wave flattening that is only slightly more pronounced than on a previous study.  He has had marked exertional dyspnea for several weeks.  Given his high level risk, I have recommended that we proceed with diagnostic coronary angiography.  The findings will have to be discussed with the patient prior to proceeding with intervention because he is scheduled for this stimulator for urinary incontinence.  The incontinence really impairs his quality of life and he is very much looking forward to the procedure.    In preparation for coronary  angiography, I discontinued the enoxaparin; he received a dose at midnight. He has NTG paste in place and received aspirin.  We will transfer him to [facility] today.

## 2021-10-25 NOTE — H&P
"[facility]   HISTORY AND PHYSICAL    Patient Name: Flaco Miles Sr.  : 1965  MRN: [id number]  Primary Care Physician:  Frank Zambrano Jr.,   Date of admission: 10/24/2021    Subjective   Subjective     Chief Complaint: chest pain    55 y/o M with hx of DM, HTN, HLD, CECILIA presented to ED with sudden onset chest pain 8/10, described as \"an elephant sitting on my chest\", radiating to left arm, resolving with sublingual nitroglycerin while in ED lasting about 30-35mins. Per patient he had similar pain 30+ years ago and does not recall what they said was the cause but denies MI history. Pain was accompanied by diaphoresis and SOB, which are now also resolved.        Review of Systems   Constitutional: Negative.    HENT: Negative.    Eyes: Negative.    Respiratory: Positive for chest tightness and shortness of breath.    Cardiovascular: Positive for chest pain.   Gastrointestinal: Negative.    Endocrine: Negative.    Genitourinary: Negative.    Musculoskeletal: Negative.    Skin: Negative.    Allergic/Immunologic: Negative.    Neurological: Negative.    Hematological: Negative.    Psychiatric/Behavioral: Negative.         Personal History     Past Medical History:   Diagnosis Date   • Anesthesia complication     WAKES VIOLENTLY.  UNKNOWN REASON   • Angina pectoris (HCC)    • Arthrofibrosis of knee joint, left    • Constipation    • DDD (degenerative disc disease), cervical    • Diabetes mellitus (HCC)     TYPE 2   • DJD (degenerative joint disease)    • Gout    • History of colon polyps    • History of kidney stones    • Hyperlipidemia    • Hypertension    • Left knee pain    • Limited range of motion (ROM) of shoulder     BOTH SHOULDERS   • Migraines    • Prostate cancer (HCC) 2017    SEED IMPLANTS   • Rotator cuff tear, right    • Sleep apnea     NO MACHINE   • Sleep disorder        Past Surgical History:   Procedure Laterality Date   • ABDOMINAL HERNIA REPAIR      X4   • ADENOIDECTOMY     • " ANKLE ARTHROPLASTY Left     Piece of glass removed and cut a tendon    • BACK SURGERY      X4   • CERVICAL FUSION  2015   • CHOLECYSTECTOMY     • COLONOSCOPY N/A 2/16/2021    Procedure: COLONOSCOPY to cecum and TI:  cold biopsy polypectomy, not snare polypectomy, cold snare polypectomies,;  Surgeon: Elenita Kaba MD;  Location: [facility] ENDOSCOPY;  Service: Gastroenterology;  Laterality: N/A;  pre:  rectal bleeding and screening  post:  polyps,    • ELBOW ARTHROPLASTY Right     Tendon and artery repair from traumatic wound    • FRACTURE SURGERY     • INGUINAL HERNIA REPAIR Left     X2   • JOINT MANIPULATION Left 4/16/2018    Procedure: LEFT KNEE MANIPULATION;  Surgeon: Vinod Crooks MD;  Location: [facility] OR;  Service: Orthopedics   • KIDNEY STONE SURGERY      MULITPLE SURGERIES   • KNEE ARTHROPLASTY Left 2018   • KNEE ARTHROPLASTY, PARTIAL REPLACEMENT Left 1989   • KNEE ARTHROSCOPY Right 1980'S  1990'S    X 2   • KNEE SURGERY Left     X8   • LEG SURGERY      from spider bites   • NECK SURGERY      x 3 (1198, 2003, 20125). Plated and fused   • PROSTATE RADIOACTIVE SEED IMPLANT  01/2017   • PROSTATE SURGERY     • SHOULDER ARTHROSCOPY Right    • SHOULDER ARTHROSCOPY W/ ROTATOR CUFF REPAIR Right 2/24/2021    Procedure: RIGHT SHOULDER ARTHROSCOPY WITH ROTATOR CUFF REPAIR AND ACROMIOPLASTY WITH DEBRIDEMENT  FOLLOWED BY OPEN DISTAL CLAVICLE EXCISION INTRASCALEN BLOCK;  Surgeon: Ramon Sun MD;  Location: [facility] OR OSC;  Service: Orthopedics;  Laterality: Right;   • TESTICLE SURGERY Right     Hydrocele repair    • TONSILLECTOMY     • TOTAL KNEE ARTHROPLASTY Left 3/5/2018    Procedure: LEFT TOTAL KNEE ARTHROPLASTY;  Surgeon: Vinod Crooks MD;  Location: [facility] OR;  Service:    • TOTAL KNEE ARTHROPLASTY REVISION Left 2003       Family History: family history includes Alcohol abuse in his sister; Alzheimer's disease in his father; Dementia in his father; Hypertension in his father, mother, and sister; No  Known Problems in his brother; Stroke in his mother. Otherwise pertinent FHx was reviewed and not pertinent to current issue.    Social History:  reports that he quit smoking about 22 years ago. His smoking use included pipe and cigarettes. He smoked 1.00 pack per day. His smokeless tobacco use includes snuff. He reports that he does not drink alcohol and does not use drugs.    Home Medications:  DULoxetine, allopurinol, amLODIPine, atorvastatin, linagliptin, lisinopril, oxyCODONE-acetaminophen, pregabalin, and tiZANidine    Allergies:  Allergies   Allergen Reactions   • Fexofenadine-Pseudoephed Er Other (See Comments)     TACHYCARDIA   • Metformin Diarrhea   • Shellfish-Derived Products Palpitations       Objective    Objective     Vitals:   Temp:  [98 °F (36.7 °C)-98.1 °F (36.7 °C)] 98 °F (36.7 °C)  Heart Rate:  [61-68] 61  Resp:  [18] 18  BP: (132-173)/() 132/88    Physical Exam  Vitals and nursing note reviewed.   Constitutional:       General: He is not in acute distress.     Appearance: Normal appearance. He is obese. He is not ill-appearing or toxic-appearing.   HENT:      Head: Normocephalic and atraumatic.      Right Ear: External ear normal.      Left Ear: External ear normal.      Nose: Nose normal. No congestion or rhinorrhea.      Mouth/Throat:      Mouth: Mucous membranes are moist.      Pharynx: No oropharyngeal exudate or posterior oropharyngeal erythema.   Eyes:      Extraocular Movements: Extraocular movements intact.      Conjunctiva/sclera: Conjunctivae normal.      Pupils: Pupils are equal, round, and reactive to light.   Cardiovascular:      Rate and Rhythm: Regular rhythm. Tachycardia present.      Pulses: Normal pulses.      Heart sounds: Normal heart sounds. No murmur heard.  No friction rub. No gallop.    Pulmonary:      Effort: Pulmonary effort is normal. No respiratory distress.      Breath sounds: Normal breath sounds. No stridor. No wheezing, rhonchi or rales.   Chest:      Chest  wall: No tenderness.   Abdominal:      General: There is no distension.      Palpations: Abdomen is soft.      Tenderness: There is no abdominal tenderness.   Musculoskeletal:         General: No swelling or tenderness. Normal range of motion.      Cervical back: Normal range of motion and neck supple.      Right lower leg: No edema.      Left lower leg: No edema.   Skin:     General: Skin is warm.   Neurological:      General: No focal deficit present.      Mental Status: He is alert and oriented to person, place, and time.   Psychiatric:         Mood and Affect: Mood normal.         Behavior: Behavior normal.         Result Review    Result Review:  I have personally reviewed the results from the time of this admission to 10/25/2021 05:35 EDT and agree with these findings:  [x]  Laboratory  []  Microbiology  []  Radiology  []  EKG/Telemetry   []  Cardiology/Vascular   []  Pathology  [x]  Old records  []  Other:  Most notable findings include: subjective of chest pain resolving after administration of nitroglycerin, elevated glc    Assessment/Plan   Assessment / Plan     Brief Patient Summary:  Flaco Miles Sr. is a 56 y.o. male who is admitted to rule out ACS    Active Hospital Problems:  Active Hospital Problems    Diagnosis    • Precordial pain      Plan:   -trend troponin and EKG in AM, echo ordered  -cardiology consulted to be seen in AM  -PRN nitroglycerin  -monitor VS  -given lovenox and nitro paste per cardiology, did not recommend transfer to [facility] per ER physician  -sliding scale insulin, A1c with AM labs    DVT prophylaxis:  Medical DVT prophylaxis orders are present.    CODE STATUS:    Level Of Support Discussed With: Patient  Code Status: No CPR  Medical Interventions (Level of Support Prior to Arrest): Full    Admission Status:  I believe this patient meets observation status.    Electronically signed by Laila Agarwal MD, 10/25/21, 12:52 AM EDT.

## 2021-10-25 NOTE — DISCHARGE SUMMARY
"Flaco Miles .  1965  [id number]    Hospitalists Discharge Summary    Date of Admission: 10/24/2021  Date of Discharge:  10/25/2021    History of Present Illness from [facility] on admit:    \"55 y/o M with hx of DM, HTN, HLD, CECILIA presented to ED with sudden onset chest pain 8/10, described as \"an elephant sitting on my chest\", radiating to left arm, resolving with sublingual nitroglycerin while in ED lasting about 30-35mins. Per patient he had similar pain 30+ years ago and does not recall what they said was the cause but denies MI history. Pain was accompanied by diaphoresis and SOB, which are now also resolved.\"    Primary Discharge diagnoses:  Chest pain    Secondary Discharge Diagnoses:   Uncontrolled DM2 with hyperglycemia in obese (a1C 11.7%)  Hypertension  Hyperlipidemia   Urinary incontinence  CECILIA    Hospital Course Summary:   The patient was followed in consultation by Cardiology, transferring to [facility] for cath today. Uncontrolled diabetes with glucose not at goal, will need diabetic education and improved medication regimen.     PCP  Patient Care Team:  Frank Zambrano Jr.,  as PCP - General (Family Medicine)  Karan Valdivia MD as Consulting Physician (Orthopedic Surgery)  Douglas Barron MD as Consulting Physician (Urology)  Tim Roman MD (Pain Medicine)  Vinod Crooks MD as Consulting Physician (Orthopedic Surgery)    Consults:   Consults     Date and Time Order Name Status Description    10/25/2021  2:38 AM Inpatient Cardiology Consult          Operations and Procedures Performed:     XR Chest 1 View    Result Date: 10/24/2021  Narrative: CR Chest 1 Vw INDICATION: Mid chest pain radiating into the left arm beginning at 2130 hours tonight. COMPARISON:  Chest 2/15/2017 FINDINGS: Portable AP view(s) of the chest.  The heart and mediastinal contours are normal. The lungs are clear. No pneumothorax or pleural effusion.     Impression: No acute cardiopulmonary findings. Signer Name: " Meliton Regalado MD  Signed: 10/24/2021 11:58 PM  Workstation Name: [id number]  [facility] Scrotum & Testicles with doppler    Result Date: 9/29/2021  Narrative: SCROTAL ULTRASOUND, INCLUDING VASCULAR DOPPLER (COMPLETE), 9/28/2021: HISTORY: 56-year-old male with prior history of surgery for right-sided scrotal hydrocele. Left inguinal hernia. He notes a 2 year history right testicular pain.. TECHNIQUE: High-resolution ultrasound imaging of the scrotal contents was performed including vascular evaluation using grayscale, spectral Doppler and color flow Doppler ultrasound imaging. COMPARISON: *  Scrotal ultrasound examination, 10/19/2018. FINDINGS: Both testes are normal in size and ultrasound appearance. No mass or other focal testicular lesion is identified. The intrascrotal extratesticular contents are also normal in appearance. Right hydrocele has not recurred. Trace scrotal fluid on the left. No epididymis enlargement, scrotal mass or significant fluid collection is demonstrated. Vascular evaluation shows normal, bilaterally symmetric blood flow to each testis and each epididymis and documents both arterial inflow and venous outflow. There is no evidence of testicular torsion, acute epididymitis or acute orchitis. Right testis: 5.2 x 2.6 x 3.7 cm. Left testis: 4.5 x 2.3 x 2.8 cm.     Impression: Normal scrotal ultrasound examination. No recurrence of right-sided hydrocele. Signer Name: Vinod Agee MD  Signed: 9/29/2021 7:55 AM  Workstation Name: [id number]  [facility] [place]    Allergies:  is allergic to fexofenadine-pseudoephed er, metformin, and shellfish-derived products.    Ernesto  lyrica 10/2021 per report, reviewed by me    Discharge Medications: PLEASE SEE DISCHARGE READMIT MEDICATIONS UPON RELEASE BY RN AT [facility] FACILITY    Last Lab Results:   Lab Results (most recent)     Procedure Component Value Units Date/Time    Basic Metabolic Panel [[id number]]   (Abnormal) Collected: 10/25/21 0416    Specimen: Blood Updated: 10/25/21 0514     Glucose 224 mg/dL      BUN 22 mg/dL      Creatinine 0.95 mg/dL      Sodium 138 mmol/L      Potassium 4.0 mmol/L      Comment: Slight hemolysis detected by analyzer. Results may be affected.        Chloride 101 mmol/L      CO2 32.0 mmol/L      Calcium 9.8 mg/dL      eGFR Non African Amer 82 mL/min/1.73      BUN/Creatinine Ratio 23.2     Anion Gap 5.0 mmol/L     Narrative:      GFR Normal >60  Chronic Kidney Disease <60  Kidney Failure <15      Troponin [[id number]]  (Normal) Collected: 10/25/21 0416    Specimen: Blood Updated: 10/25/21 0514     Troponin T <0.010 ng/mL     Narrative:      Troponin T Reference Range:  <= 0.03 ng/mL-   Negative for AMI  >0.03 ng/mL-     Abnormal for myocardial necrosis.  Clinicians would have to utilize clinical acumen, EKG, Troponin and serial changes to determine if it is an Acute Myocardial Infarction or myocardial injury due to an underlying chronic condition.       Results may be falsely decreased if patient taking Biotin.      CBC (No Diff) [[id number]]  (Normal) Collected: 10/25/21 0416    Specimen: Blood Updated: 10/25/21 0449     WBC 8.43 10*3/mm3      RBC 4.90 10*6/mm3      Hemoglobin 14.2 g/dL      Hematocrit 42.7 %      MCV 87.1 fL      MCH 29.0 pg      MCHC 33.3 g/dL      RDW 13.2 %      RDW-SD 42.0 fl      MPV 9.8 fL      Platelets 199 10*3/mm3     CK Total & CKMB [[id number]] Collected: 10/24/21 2341    Specimen: Blood Updated: 10/25/21 0315    Magnesium [[id number]]  (Normal) Collected: 10/24/21 2341    Specimen: Blood Updated: 10/25/21 0313     Magnesium 1.9 mg/dL     Phosphorus [[id number]]  (Normal) Collected: 10/24/21 2341    Specimen: Blood Updated: 10/25/21 0313     Phosphorus 3.4 mg/dL     Hemoglobin A1c [[id number]]  (Abnormal) Collected: 10/24/21 2341    Specimen: Blood Updated: 10/25/21 0309     Hemoglobin A1C 11.70 %     Narrative:      Hemoglobin A1C Ranges:    Increased Risk for  Diabetes  5.7% to 6.4%  Diabetes                     >= 6.5%  Diabetic Goal                < 7.0%    COVID PRE-OP / PRE-PROCEDURE SCREENING ORDER (NO ISOLATION) - Swab, Nasal Cavity [[id number]]  (Normal) Collected: 10/25/21 0043    Specimen: Swab from Nasal Cavity Updated: 10/25/21 0131    Narrative:      The following orders were created for panel order COVID PRE-OP / PRE-PROCEDURE SCREENING ORDER (NO ISOLATION) - Swab, Nasal Cavity.  Procedure                               Abnormality         Status                     ---------                               -----------         ------                     COVID-19,[employer] IN-ABDI...[[id number]]  Normal              Final result                 Please view results for these tests on the individual orders.    COVID-19,[employer] IN-HOUSE,Nasal Swab No Transport Media 3-4 HR TAT - Swab, Nasal Cavity [[id number]]  (Normal) Collected: 10/25/21 0043    Specimen: Swab from Nasal Cavity Updated: 10/25/21 0131     COVID19 Not Detected    Narrative:      Fact sheet for providers: https://www.fda.gov/media/852674/download     Fact sheet for patients: https://www.fda.gov/media/254879/download    Test performed by PCR.    Consider negative results in combination with clinical observations, patient history, and epidemiological information.    [place] Draw [[id number]] Collected: 10/24/21 2341    Specimen: Blood Updated: 10/25/21 0045    Narrative:      The following orders were created for panel order [place] Draw.  Procedure                               Abnormality         Status                     ---------                               -----------         ------                     Green Top (Gel)[[id number]]                                  Final result               Lavender Top[[id number]]                                     Final result               Gold Top - SST[[id number]]                                   Final result               Light Blue Top[[id number]]                                    Final result                 Please view results for these tests on the individual orders.    Gold Top - SST [[id number]] Collected: 10/24/21 2341    Specimen: Blood Updated: 10/25/21 0045     Extra Tube Hold for add-ons.     Comment: Auto resulted.       Light Blue Top [[id number]] Collected: 10/24/21 2341    Specimen: Blood Updated: 10/25/21 0045     Extra Tube hold for add-on     Comment: Auto resulted       Green Top (Gel) [[id number]] Collected: 10/24/21 2341    Specimen: Blood Updated: 10/25/21 0045     Extra Tube Hold for add-ons.     Comment: Auto resulted.       Lavender Top [[id number]] Collected: 10/24/21 2341    Specimen: Blood Updated: 10/25/21 0045     Extra Tube hold for add-on     Comment: Auto resulted       BNP [[id number]]  (Normal) Collected: 10/24/21 2341    Specimen: Blood Updated: 10/25/21 0025     proBNP 135.4 pg/mL     Narrative:      Among patients with dyspnea, NT-proBNP is highly sensitive for the detection of acute congestive heart failure. In addition NT-proBNP of <300 pg/ml effectively rules out acute congestive heart failure with 99% negative predictive value.    Results may be falsely decreased if patient taking Biotin.      Troponin [[id number]]  (Normal) Collected: 10/24/21 2341    Specimen: Blood Updated: 10/25/21 0021     Troponin T <0.010 ng/mL     Narrative:      Troponin T Reference Range:  <= 0.03 ng/mL-   Negative for AMI  >0.03 ng/mL-     Abnormal for myocardial necrosis.  Clinicians would have to utilize clinical acumen, EKG, Troponin and serial changes to determine if it is an Acute Myocardial Infarction or myocardial injury due to an underlying chronic condition.       Results may be falsely decreased if patient taking Biotin.      Comprehensive Metabolic Panel [[id number]]  (Abnormal) Collected: 10/24/21 2341    Specimen: Blood Updated: 10/25/21 0020     Glucose 180 mg/dL      BUN 15 mg/dL      Creatinine 1.13 mg/dL      Sodium 139 mmol/L      Potassium 3.7  mmol/L      Chloride 99 mmol/L      CO2 29.4 mmol/L      Calcium 10.0 mg/dL      Total Protein 7.2 g/dL      Albumin 4.50 g/dL      ALT (SGPT) 37 U/L      AST (SGOT) 22 U/L      Alkaline Phosphatase 75 U/L      Total Bilirubin 1.3 mg/dL      eGFR Non African Amer 67 mL/min/1.73      Globulin 2.7 gm/dL      A/G Ratio 1.7 g/dL      BUN/Creatinine Ratio 13.3     Anion Gap 10.6 mmol/L     Narrative:      GFR Normal >60  Chronic Kidney Disease <60  Kidney Failure <15      CBC & Differential [[id number]]  (Abnormal) Collected: 10/24/21 2341    Specimen: Blood Updated: 10/24/21 2346    Narrative:      The following orders were created for panel order CBC & Differential.  Procedure                               Abnormality         Status                     ---------                               -----------         ------                     CBC Auto Differential[[id number]]        Abnormal            Final result                 Please view results for these tests on the individual orders.    CBC Auto Differential [[id number]]  (Abnormal) Collected: 10/24/21 2341    Specimen: Blood Updated: 10/24/21 2346     WBC 8.60 10*3/mm3      RBC 5.26 10*6/mm3      Hemoglobin 15.0 g/dL      Hematocrit 45.5 %      MCV 86.5 fL      MCH 28.5 pg      MCHC 33.0 g/dL      RDW 13.3 %      RDW-SD 41.8 fl      MPV 9.3 fL      Platelets 228 10*3/mm3      Neutrophil % 51.9 %      Lymphocyte % 38.0 %      Monocyte % 7.9 %      Eosinophil % 1.9 %      Basophil % 0.2 %      Immature Grans % 0.1 %      Neutrophils, Absolute 4.46 10*3/mm3      Lymphocytes, Absolute 3.27 10*3/mm3      Monocytes, Absolute 0.68 10*3/mm3      Eosinophils, Absolute 0.16 10*3/mm3      Basophils, Absolute 0.02 10*3/mm3      Immature Grans, Absolute 0.01 10*3/mm3      nRBC 0.0 /100 WBC         Imaging Results (Most Recent)     Procedure Component Value Units Date/Time    XR Chest 1 View [[id number]] Collected: 10/24/21 2358     Updated: 10/25/21 0000    Narrative:      CR Chest  1 Vw    INDICATION:   Mid chest pain radiating into the left arm beginning at 2130 hours tonight.     COMPARISON:    Chest 2/15/2017    FINDINGS:  Portable AP view(s) of the chest.  The heart and mediastinal contours are normal. The lungs are clear. No pneumothorax or pleural effusion.      Impression:      No acute cardiopulmonary findings.    Signer Name: Meliton Regalado MD   Signed: 10/24/2021 11:58 PM   Workstation Name: [id number]    [facility]          PROCEDURES: NONE    Condition on Discharge:  stable    Physical Exam at Discharge  Vital Signs  Temp:  [98 °F (36.7 °C)-98.2 °F (36.8 °C)] 98.2 °F (36.8 °C)  Heart Rate:  [51-68] 51  Resp:  [18-20] 20  BP: (101-173)/() 134/86   Body mass index is 30.52 kg/m².    Physical Exam  Vitals reviewed.   Constitutional:       General: He is not in acute distress.     Appearance: He is obese. He is not ill-appearing.   HENT:      Head: Normocephalic and atraumatic.      Mouth/Throat:      Mouth: Mucous membranes are moist.   Eyes:      Extraocular Movements: Extraocular movements intact.      Pupils: Pupils are equal, round, and reactive to light.   Cardiovascular:      Rate and Rhythm: Normal rate and regular rhythm.   Pulmonary:      Effort: Pulmonary effort is normal. No respiratory distress.      Breath sounds: Normal breath sounds. No wheezing or rales.   Abdominal:      General: Abdomen is flat. Bowel sounds are normal. There is no distension.      Palpations: Abdomen is soft.      Tenderness: There is no abdominal tenderness. There is no guarding.   Musculoskeletal:         General: No swelling.   Skin:     General: Skin is warm and dry.      Capillary Refill: Capillary refill takes less than 2 seconds.      Findings: No erythema.   Neurological:      General: No focal deficit present.      Mental Status: He is alert and oriented to person, place, and time.   Psychiatric:         Mood and Affect: Mood normal.         Behavior:  Behavior normal.       Discharge Disposition  [facility]    Visiting Nurse:    n/a    Home PT/OT:  No     Home Safety Evaluation:  No     DME  None new    Discharge Diet:      Dietary Orders (From admission, onward)     Start     Ordered    10/25/21 0504  NPO Diet  Diet Effective Now         10/25/21 0504                Activity at Discharge:  bedrest    Pre-discharge education  Diabetic, Cardiac, medications, follow up    Follow-up Appointments  Future Appointments   Date Time Provider Department Center   11/18/2021  8:10 AM [facility]   11/29/2021 11:00 AM Frank Zambrano Jr., DO [facility]         Test Results Pending at Discharge: to be f/u by cardiology  Pending Labs     Order Current Status    CK Total & CKMB In process           KAYLEEN Perez  10/25/21  10:30 EDT    Time: Discharge 30 min (if over 30 minutes give explanation as to why it took greater than 30 minutes)

## 2021-10-25 NOTE — ED PROVIDER NOTES
"Subjective   Flaco Miles is a 56-year-old white male who present secondary to chest pain.  Onset of pain \"at half time of the football game\".  Estimates this was 9:30 this evening.  Is worse the pain was not 849.  Patient has associated mild shortness of breath and nausea as well as burning in the left arm.  Patient states it feels like he is being bitten by fire ants on his left arm.  Patient states this pain is similar to back pain he experienced before needing back surgery.  Cardiac risk factors include diabetes, hypertension, hyperlipidemia.  Patient has not smoked in 24 years.  He is under the care of cardiology.  No history of MI.  Patient presents for evaluation.    Patient is vaccinated for COVID-19.    Family history: Sister  of an MI at age 56.  This occurred this past year.  Patient's mother also recently passed away from acute MI.      History provided by:  Patient      Review of Systems   Constitutional: Negative for fever.   HENT: Negative for rhinorrhea.    Eyes: Negative for redness.   Respiratory: Positive for shortness of breath. Negative for cough.    Cardiovascular: Positive for chest pain.   Gastrointestinal: Positive for nausea. Negative for abdominal pain.   Genitourinary: Negative for dysuria.   Musculoskeletal: Negative for back pain.   Skin: Negative for rash.   Neurological: Negative for syncope.   All other systems reviewed and are negative.      Past Medical History:   Diagnosis Date   • Anesthesia complication     WAKES VIOLENTLY.  UNKNOWN REASON   • Angina pectoris (HCC)    • Arthrofibrosis of knee joint, left    • Constipation    • DDD (degenerative disc disease), cervical    • Diabetes mellitus (HCC)     TYPE 2   • DJD (degenerative joint disease)    • Gout    • History of colon polyps    • History of kidney stones    • Hyperlipidemia    • Hypertension    • Left knee pain    • Limited range of motion (ROM) of shoulder     BOTH SHOULDERS   • Migraines    • Prostate cancer (HCC) " 01/2017    SEED IMPLANTS   • Rotator cuff tear, right    • Sleep apnea     NO MACHINE   • Sleep disorder        Allergies   Allergen Reactions   • Fexofenadine-Pseudoephed Er Other (See Comments)     TACHYCARDIA   • Metformin Diarrhea       Past Surgical History:   Procedure Laterality Date   • ABDOMINAL HERNIA REPAIR      X4   • ADENOIDECTOMY     • ANKLE ARTHROPLASTY Left     Piece of glass removed and cut a tendon    • BACK SURGERY      X4   • CERVICAL FUSION  2015   • CHOLECYSTECTOMY     • COLONOSCOPY N/A 2/16/2021    Procedure: COLONOSCOPY to cecum and TI:  cold biopsy polypectomy, not snare polypectomy, cold snare polypectomies,;  Surgeon: Elenita Kaba MD;  Location: [facility] ENDOSCOPY;  Service: Gastroenterology;  Laterality: N/A;  pre:  rectal bleeding and screening  post:  polyps,    • ELBOW ARTHROPLASTY Right     Tendon and artery repair from traumatic wound    • INGUINAL HERNIA REPAIR Left     X2   • JOINT MANIPULATION Left 4/16/2018    Procedure: LEFT KNEE MANIPULATION;  Surgeon: Vinod Crooks MD;  Location: [facility] MAIN OR;  Service: Orthopedics   • KIDNEY STONE SURGERY      MULITPLE SURGERIES   • KNEE ARTHROPLASTY Left 2018   • KNEE ARTHROPLASTY, PARTIAL REPLACEMENT Left 1989   • KNEE ARTHROSCOPY Right 1980'S  1990'S    X 2   • KNEE SURGERY Left     X8   • LEG SURGERY      from spider bites   • NECK SURGERY      x 3 (1198, 2003, 20125). Plated and fused   • PROSTATE RADIOACTIVE SEED IMPLANT  01/2017   • PROSTATE SURGERY     • SHOULDER ARTHROSCOPY Right    • SHOULDER ARTHROSCOPY W/ ROTATOR CUFF REPAIR Right 2/24/2021    Procedure: RIGHT SHOULDER ARTHROSCOPY WITH ROTATOR CUFF REPAIR AND ACROMIOPLASTY WITH DEBRIDEMENT  FOLLOWED BY OPEN DISTAL CLAVICLE EXCISION INTRASCALEN BLOCK;  Surgeon: Ramon Sun MD;  Location: [facility] OR [facility];  Service: Orthopedics;  Laterality: Right;   • TESTICLE SURGERY Right     Hydrocele repair    • TONSILLECTOMY     • TOTAL KNEE ARTHROPLASTY Left 3/5/2018    Procedure:  LEFT TOTAL KNEE ARTHROPLASTY;  Surgeon: Vinod Crooks MD;  Location: [facility];  Service:    • TOTAL KNEE ARTHROPLASTY REVISION Left        Family History   Problem Relation Age of Onset   • Stroke Mother    • Hypertension Mother    • Alzheimer's disease Father    • Dementia Father    • Hypertension Father    • Alcohol abuse Sister    • Hypertension Sister    • No Known Problems Brother    • Malig Hyperthermia Neg Hx    • Colon cancer Neg Hx    • Colon polyps Neg Hx        Social History     Socioeconomic History   • Marital status:    Tobacco Use   • Smoking status: Former Smoker     Packs/day: 1.00     Types: Pipe, Cigarettes     Quit date:      Years since quittin.8   • Smokeless tobacco: Current User     Types: Snuff   • Tobacco comment: quit 2020   Vaping Use   • Vaping Use: Never used   Substance and Sexual Activity   • Alcohol use: Never   • Drug use: No   • Sexual activity: Defer           Objective   Physical Exam  Vitals and nursing note reviewed.   Constitutional:       General: He is not in acute distress.     Appearance: Normal appearance. He is well-developed. He is not ill-appearing, toxic-appearing or diaphoretic.      Comments: 56-year-old white male laying in bed.  Patient is a bit overweight.  He otherwise appears in good health.  Patient is obviously in discomfort.  Vital signs notable for BP of 173/105.  Otherwise unremarkable.  Patient reports ongoing chest and left upper extremity pain.   HENT:      Head: Normocephalic and atraumatic.      Right Ear: Tympanic membrane, ear canal and external ear normal.      Left Ear: Tympanic membrane, ear canal and external ear normal.      Nose: Nose normal.      Mouth/Throat:      Mouth: Mucous membranes are moist.      Pharynx: Oropharynx is clear.   Eyes:      Extraocular Movements: Extraocular movements intact.      Conjunctiva/sclera: Conjunctivae normal.      Pupils: Pupils are equal, round, and reactive to light.    Cardiovascular:      Rate and Rhythm: Normal rate and regular rhythm.      Heart sounds: Normal heart sounds. No murmur heard.  No friction rub. No gallop.    Pulmonary:      Effort: Pulmonary effort is normal. No respiratory distress.      Breath sounds: Normal breath sounds. No stridor. No wheezing or rales.   Abdominal:      General: There is no distension.      Palpations: Abdomen is soft. There is no mass.      Tenderness: There is no abdominal tenderness. There is no guarding or rebound.      Hernia: No hernia is present.   Musculoskeletal:         General: Normal range of motion.      Cervical back: Normal range of motion and neck supple.   Skin:     General: Skin is warm and dry.      Findings: No erythema or rash.   Neurological:      General: No focal deficit present.      Mental Status: He is alert and oriented to person, place, and time.      Cranial Nerves: No cranial nerve deficit.      Deep Tendon Reflexes: Reflexes are normal and symmetric.   Psychiatric:         Mood and Affect: Mood normal.         Behavior: Behavior normal.         Procedures       EKG 12-lead  Date 10/24/2021  Time 23: 37  Normal sinus rate  Normal rhythm  Prolonged VT interval  Leftward axis  RSR prime in V1  Early R wave transition  Q wave present in lead III  No ST elevations or depressions  Nonspecific T wave abnormalities  Abnormal EKG    Unchanged from EKG dated 10/22/2021    ED Course  ED Course as of 10/25/21 0041   Sun Oct 24, 2021   2344 Patient rates his chest pain at a 7 currently.  EKG does not show any sign of acute infarct or ischemia at this time.  Giving aspirin, nitroglycerin and Zofran. [SS]   2359 Patient reports chest pain is now a level 2 after a single sublingual nitro.  Placing nitro paste on patient's chest.  Patient states he has not been prescribed nitroglycerin in 20 years. [SS]   Mon Oct 25, 2021   0022 Glucose(!): 180 [SS]   0022 CBC unremarkable.  CMP notable for blood sugar of 180.  Otherwise  unremarkable.  Troponin negative a 0.010. [SS]   0023 Chest x-ray is unremarkable.  Patient pain-free after nitroglycerin paste applied to chest. [SS]   0028 proBNP negative.  Discussed at length with patient and his family all results, diagnosis and indications for hospitalization.  Patient would much prefer to be discharged.  I am consulting cardiology. [SS]   0029 Discussed with Dr. Dylon Chirinos.  He feels patient should be hospitalized for further cardiac evaluation.  Calling the hospitalist.  We will give patient a Lovenox. [SS]   0035 Patient is in pain management secondary to neck and back issues.  He takes Percocet 10/325 4 times a day.  He has not had his midnight dose.  Thus I will provide. [SS]   0038 Discussed with Dr. Kerr-hospitalist.  Patient will be placed in observation. [SS]      ED Course User Index  [SS] Joselito Winston MD      Labs Reviewed   COMPREHENSIVE METABOLIC PANEL - Abnormal; Notable for the following components:       Result Value    Glucose 180 (*)     CO2 29.4 (*)     Total Bilirubin 1.3 (*)     All other components within normal limits    Narrative:     GFR Normal >60  Chronic Kidney Disease <60  Kidney Failure <15     CBC WITH AUTO DIFFERENTIAL - Abnormal; Notable for the following components:    Lymphocytes, Absolute 3.27 (*)     All other components within normal limits   TROPONIN (IN-HOUSE) - Normal    Narrative:     Troponin T Reference Range:  <= 0.03 ng/mL-   Negative for AMI  >0.03 ng/mL-     Abnormal for myocardial necrosis.  Clinicians would have to utilize clinical acumen, EKG, Troponin and serial changes to determine if it is an Acute Myocardial Infarction or myocardial injury due to an underlying chronic condition.       Results may be falsely decreased if patient taking Biotin.     BNP (IN-HOUSE) - Normal    Narrative:     Among patients with dyspnea, NT-proBNP is highly sensitive for the detection of acute congestive heart failure. In addition NT-proBNP of <300  pg/ml effectively rules out acute congestive heart failure with 99% negative predictive value.    Results may be falsely decreased if patient taking Biotin.     RAINBOW DRAW    Narrative:     The following orders were created for panel order [place] Draw.  Procedure                               Abnormality         Status                     ---------                               -----------         ------                     Green Top (Gel)[[id number]]                                  In process                 Lavender Top[[id number]]                                     In process                 Gold Top - SST[[id number]]                                   In process                 Light Blue Top[[id number]]                                   In process                   Please view results for these tests on the individual orders.   TROPONIN (IN-HOUSE)   CBC AND DIFFERENTIAL    Narrative:     The following orders were created for panel order CBC & Differential.  Procedure                               Abnormality         Status                     ---------                               -----------         ------                     CBC Auto Differential[[id number]]        Abnormal            Final result                 Please view results for these tests on the individual orders.   GREEN TOP   LAVENDER TOP   GOLD TOP - SST   LIGHT BLUE TOP     XR Chest 1 View    Result Date: 10/24/2021  Narrative: CR Chest 1 Vw INDICATION: Mid chest pain radiating into the left arm beginning at 2130 hours tonight. COMPARISON:  Chest 2/15/2017 FINDINGS: Portable AP view(s) of the chest.  The heart and mediastinal contours are normal. The lungs are clear. No pneumothorax or pleural effusion.     Impression: No acute cardiopulmonary findings. Signer Name: Meliton Regalado MD  Signed: 10/24/2021 11:58 PM  Workstation Name: [id number]  [facility] Scrotum & Testicles with doppler    Result Date:  9/29/2021  Narrative: SCROTAL ULTRASOUND, INCLUDING VASCULAR DOPPLER (COMPLETE), 9/28/2021: HISTORY: 56-year-old male with prior history of surgery for right-sided scrotal hydrocele. Left inguinal hernia. He notes a 2 year history right testicular pain.. TECHNIQUE: High-resolution ultrasound imaging of the scrotal contents was performed including vascular evaluation using grayscale, spectral Doppler and color flow Doppler ultrasound imaging. COMPARISON: *  Scrotal ultrasound examination, 10/19/2018. FINDINGS: Both testes are normal in size and ultrasound appearance. No mass or other focal testicular lesion is identified. The intrascrotal extratesticular contents are also normal in appearance. Right hydrocele has not recurred. Trace scrotal fluid on the left. No epididymis enlargement, scrotal mass or significant fluid collection is demonstrated. Vascular evaluation shows normal, bilaterally symmetric blood flow to each testis and each epididymis and documents both arterial inflow and venous outflow. There is no evidence of testicular torsion, acute epididymitis or acute orchitis. Right testis: 5.2 x 2.6 x 3.7 cm. Left testis: 4.5 x 2.3 x 2.8 cm.     Impression: Normal scrotal ultrasound examination. No recurrence of right-sided hydrocele. Signer Name: Vinod Agee MD  Signed: 9/29/2021 7:55 AM  Workstation Name: [id number]  [facility]    My differential diagnosis for chest pain includes but is not limited to:  Muscle strain, costochondritis, myositis, pleurisy, rib fracture, intercostal neuritis, herpes zoster, tumor, myocardial infarction, coronary syndrome, unstable angina, angina, aortic dissection, mitral valve prolapse, pericarditis, palpitations, pulmonary embolus, pneumonia, pneumothorax, lung cancer, GERD, esophagitis, esophageal spasm                                       MDM    Final diagnoses:   Precordial pain   Type 2 diabetes mellitus with hyperglycemia, without long-term  current use of insulin (HCC)       ED Disposition  ED Disposition     ED Disposition Condition Comment    Decision to Admit  Level of Care: Telemetry [5]   Admitting Physician: SHAYE CROUCH [[id number]]   Attending Physician: SHAYE CROUCH [[id number]]   Patient Class: Observation [104]            No follow-up provider specified.       Medication List      No changes were made to your prescriptions during this visit.          Joselito Winston MD  10/25/21 0041

## 2021-10-25 NOTE — CASE MANAGEMENT/SOCIAL WORK
Discharge Planning Assessment  [facility]     Patient Name: Flaco Miles .  MRN: [id number]  Today's Date: 10/25/2021    Admit Date: 10/24/2021     Discharge Needs Assessment     Row Name 10/25/21 0935       Living Environment    Lives With spouse    Name(s) of Who Lives With Patient Wife - Andreea    Current Living Arrangements home/apartment/condo    Primary Care Provided by self    Provides Primary Care For no one    Family Caregiver if Needed spouse    Family Caregiver Names Andreea - wife    Quality of Family Relationships helpful; involved; supportive    Able to Return to Prior Arrangements yes       Resource/Environmental Concerns    Resource/Environmental Concerns none       Transition Planning    Patient/Family Anticipates Transition to home with family    Transportation Anticipated family or friend will provide       Discharge Needs Assessment    Readmission Within the Last 30 Days no previous admission in last 30 days    Equipment Currently Used at Home glucometer; cane, straight    Concerns to be Addressed discharge planning               Discharge Plan     Row Name 10/25/21 0936       Plan    Plan Home when stable    Provided Post Acute Provider List? N/A    N/A Provider List Comment No needs at this time    Patient/Family in Agreement with Plan yes    Plan Comments Spoke with Mr Miles at bedside.  He and his wife live in a home in [place].  Facesheet verified.  The only DME he has is a glucometer.  He ambulates with a cane.  He is independent with his ADL's and he drives himself.  His pharmacy is [facility] in [place] and there are no issues with paying for his prescriptions.  He does not have an advanced directive and has no interest in such.  Plan is home when stable.  Will continue to follow              Continued Care and Services - Admitted Since 10/24/2021    Coordination has not been started for this encounter.          Demographic Summary     Row Name 10/25/21 0935       General  Information    Admission Type observation    Arrived From home    Referral Source admission list    Reason for Consult discharge planning    Preferred Language English     Used During This Interaction no       Contact Information    Permission Granted to Share Info With                Functional Status    No documentation.                Psychosocial    No documentation.                Abuse/Neglect    No documentation.                Legal    No documentation.                Substance Abuse    No documentation.                Patient Forms    No documentation.                   Alicia Tong RN

## 2021-10-25 NOTE — NURSING NOTE
Report called to Lili at [facility]. [facility] cath lab.  [facility] called. Patient ready for transfer at this time

## 2021-10-26 NOTE — CASE MANAGEMENT/SOCIAL WORK
Case Management Discharge Note      Final Note: Discharged to [facility]    Provided Post Acute Provider List?: N/A  N/A Provider List Comment: No needs at this time    Selected Continued Care - Discharged on 10/25/2021 Admission date: 10/24/2021 - Discharge disposition: Short Term Hospital (DC - External)    Destination    No services have been selected for the patient.              Durable Medical Equipment    No services have been selected for the patient.              Dialysis/Infusion    No services have been selected for the patient.              Home Medical Care    No services have been selected for the patient.              Therapy    No services have been selected for the patient.              Community Resources    No services have been selected for the patient.              Community & DME    No services have been selected for the patient.                       Final Discharge Disposition Code: 02 - short term hospital for [facility]

## 2021-10-27 NOTE — PROGRESS NOTES
Date of Office Visit:  10/27/2021  Encounter Provider:  Dick Holly MD  Place of Service:  [facility] CARDIOLOGY  Patient Name: Flaco Miles Sr.  :  1965        Dear Dr Gonzalez,    Mr Miles was admitted on 2021 with chest pain; he did not suffer acute coronary syndrome. He underwent coronary angiography which did not reveal significant CAD. 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  He has normal LV systolic function.  An echo showed a small amount of aortic valvular calcification, and blood cultures were negative; this is not felt to be a vegetation.    He is at low risk of major adverse CV events with upcoming surgery.     Please contact our office with any questions or concerns. As always, it has been a pleasure to participate in your patient's care.      Dick Holly MD  [facility]

## 2021-10-30 LAB
BACTERIA SPEC AEROBE CULT: NORMAL
BACTERIA SPEC AEROBE CULT: NORMAL

## 2021-11-16 ENCOUNTER — OFFICE VISIT (OUTPATIENT)
Dept: CARDIOLOGY | Facility: CLINIC | Age: 56
End: 2021-11-16

## 2021-11-16 VITALS
BODY MASS INDEX: 31.02 KG/M2 | WEIGHT: 229 LBS | SYSTOLIC BLOOD PRESSURE: 130 MMHG | DIASTOLIC BLOOD PRESSURE: 78 MMHG | HEIGHT: 72 IN | HEART RATE: 62 BPM | OXYGEN SATURATION: 96 % | RESPIRATION RATE: 16 BRPM

## 2021-11-16 DIAGNOSIS — Q24.9 CONGENITAL HEART DEFECT: ICD-10-CM

## 2021-11-16 DIAGNOSIS — E78.2 MIXED HYPERLIPIDEMIA: ICD-10-CM

## 2021-11-16 DIAGNOSIS — G47.33 OSA (OBSTRUCTIVE SLEEP APNEA): ICD-10-CM

## 2021-11-16 DIAGNOSIS — I10 ESSENTIAL (PRIMARY) HYPERTENSION: Primary | ICD-10-CM

## 2021-11-16 PROCEDURE — 99214 OFFICE O/P EST MOD 30 MIN: CPT | Performed by: NURSE PRACTITIONER

## 2021-11-16 PROCEDURE — 93000 ELECTROCARDIOGRAM COMPLETE: CPT | Performed by: NURSE PRACTITIONER

## 2021-11-16 RX ORDER — AMLODIPINE BESYLATE 2.5 MG/1
2.5 TABLET ORAL DAILY
Qty: 90 TABLET | Refills: 3 | Status: SHIPPED | OUTPATIENT
Start: 2021-11-16

## 2021-11-16 RX ORDER — LISINOPRIL 2.5 MG/1
2.5 TABLET ORAL EVERY MORNING
Qty: 90 TABLET | Refills: 3 | Status: SHIPPED | OUTPATIENT
Start: 2021-11-16 | End: 2022-09-20

## 2021-11-16 NOTE — PROGRESS NOTES
Date of Office Visit: 2021  Encounter Provider: KAYLEEN Napier  Place of Service: [facility] CARDIOLOGY  Patient Name: Flaco Miles Sr.  :1965  Primary Cardiologist: Dr. Holly    CC:  Follow up test/review    Dear Dr. Zambrano    HPI: Flaco Miles Sr. is a pleasant 56 y.o. male who presents 2021 for cardiac follow up.  He is a new patient to me and I reviewed his past medical records.  He is a patient of Dr. Holly and was seen in consultation 10/25/2021.  He has poorly controlled diabetes, hypertension, hyperlipidemia, a strong family history of premature coronary disease, and ongoing nicotine abuse who presents with chest pain.    Mr Miles was admitted on 2021 with chest pain; he did not suffer acute coronary syndrome. He underwent coronary angiography which did not reveal significant CAD. 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  He has normal LV systolic function.  An echo showed a small amount of aortic valvular calcification, and blood cultures were negative; this was not felt to be a vegetation. A cardiac  CTA  Was suggested as an outpatient.    10/25/2021 Echo  Interpretation Summary  · Calculated left ventricular EF = 65.5% Estimated left ventricular EF was in agreement with the calculated left ventricular EF. Left ventricular systolic function is normal. Normal left ventricular cavity size noted. Left ventricular wall thickness is consistent with mild to moderate concentric hypertrophy. All left ventricular wall segments contract normally. Left ventricular diastolic function was normal.  · There is a focal area of calcification on the ventricular surface of the left coronary cusp. It moves with the valve but does not appear to be independently mobile. While calcific changes are most likely, a vegetation cannot be excluded.    10/25/2021 cardiac cath SELECTIVE CORONARY  "ANGIOGRAMS:  1. Left main artery: Large caliber vessel with 0% stenosis. This leads directly into the LAD.  2. Left anterior descending artery: Large caliber vessel with 0% proximal stenosis. Proximal vessel gives rise to a small caliber first and a large caliber second diagonal branches both with no significant disease. The mid LAD tapers to a moderate caliber vessel with 0% stenosis. The distal LAD tapers to a small caliber vessel with 0% stenosis.  3. Left circumflex artery: Anomalous origin from the right coronary cusp inferior to the ostium of the right coronary artery. This is a large caliber vessel with 0% proximal to mid stenosis. The mid vessel gives rise to a small caliber first obtuse marginal branch with no significant disease. The distal circumflex remains large caliber vessel giving rise to a moderate caliber second and third obtuse marginal branches with 0% stenosis. The distal circumflex tapers remains a large caliber vessel and bifurcates into a moderate caliber fourth obtuse marginal branch and a distal circumflex both with no significant disease.  4. Large-caliber vessel with 0% proximal to distal stenosis. The distal vessel bifurcates into a large caliber posterior descending and a small caliber posterior lateral branch both with 0% stenosis. This is a right dominant system.    LEFT VENTRICULAR HEMODYNAMICS:  1. Left ventricular pressure: 127/10  2. Aortic pressure: 108/69    LEFT VENTRICULOGRAM:  Normal left ventricular systolic function and wall motion with a visually estimated ejection fraction of 60%.    He has urinary incontinence and had a nerve stimulator placed 10/28/2021 as an outpatient procedure.  He has a [employer] 3058 neurostimulator.  Serial number [id number].  He brought his materials with him concerning his stimulator.  He also has an information sheet that states \"I am full body MRI eligible with my [employer] InterStim system\".  It states that his other booklets that he may not " have CAT scans.     He presents today in follow-up.  He states once or twice a week he will feel palpitations and what feels like an irregular heart rate.  He states he has had this for a long time.  He also states he sat episodic SVT in the past after eating a large amount of shellfish and was found to have a reaction to the iodine but is not technically allergic to iodine itself.  He denies any shortness of breath.  He states he has intermittent lower extremity edema and swelling in his hands and fingers, however there is none present today.  He states he will have dizziness that is unchanged.  This seems like it mostly happens if he is lying flat and then his head feels like it is swimming.  This is not often.  He denies any anginal pain.  He also states that he has fatigue and decreased energy.  He does walk with a cane and a limp from neuropathy.  He has CECILIA but states he cannot wear a mask.  He ran out of his amlodipine about 2 weeks ago.  Blood pressure on recheck was 130/78.  He states that is very normal for him.      Past Medical History:   Diagnosis Date   • Anesthesia complication     WAKES VIOLENTLY.  UNKNOWN REASON   • Arthrofibrosis of knee joint, left    • Constipation    • DDD (degenerative disc disease), cervical    • DJD (degenerative joint disease)    • Gout    • History of colon polyps    • History of kidney stones    • Hyperlipidemia    • Hypertension    • Left knee pain    • Limited range of motion (ROM) of shoulder     BOTH SHOULDERS   • Migraines    • Prostate cancer (HCC) 01/2017    SEED IMPLANTS   • Rotator cuff tear, right    • Sleep apnea     NO MACHINE   • Type 2 diabetes mellitus (HCC)        Past Surgical History:   Procedure Laterality Date   • ABDOMINAL HERNIA REPAIR      X4   • ADENOIDECTOMY     • ANKLE ARTHROPLASTY Left     Piece of glass removed and cut a tendon    • BACK SURGERY      X4   • CARDIAC CATHETERIZATION N/A 10/25/2021    Procedure: Left Heart Cath;  Surgeon: Latoya  MD Layne;  Location: [facility] CATH INVASIVE LOCATION;  Service: Cardiology;  Laterality: N/A;   • CARDIAC CATHETERIZATION N/A 10/25/2021    Procedure: Coronary angiography;  Surgeon: Layne Klein MD;  Location: [facility] CATH INVASIVE LOCATION;  Service: Cardiology;  Laterality: N/A;   • CARDIAC CATHETERIZATION N/A 10/25/2021    Procedure: Left ventriculography;  Surgeon: Layne Klein MD;  Location: [facility] CATH INVASIVE LOCATION;  Service: Cardiology;  Laterality: N/A;   • CERVICAL FUSION  2015   • CHOLECYSTECTOMY     • COLONOSCOPY N/A 2/16/2021    Procedure: COLONOSCOPY to cecum and TI:  cold biopsy polypectomy, not snare polypectomy, cold snare polypectomies,;  Surgeon: Elenita Kaba MD;  Location: [facility] ENDOSCOPY;  Service: Gastroenterology;  Laterality: N/A;  pre:  rectal bleeding and screening  post:  polyps,    • ELBOW ARTHROPLASTY Right     Tendon and artery repair from traumatic wound    • FRACTURE SURGERY     • INGUINAL HERNIA REPAIR Left     X2   • JOINT MANIPULATION Left 4/16/2018    Procedure: LEFT KNEE MANIPULATION;  Surgeon: Vinod Crooks MD;  Location: [facility] MAIN OR;  Service: Orthopedics   • KIDNEY STONE SURGERY      MULITPLE SURGERIES   • KNEE ARTHROPLASTY Left 2018   • KNEE ARTHROPLASTY, PARTIAL REPLACEMENT Left 1989   • KNEE ARTHROSCOPY Right 1980'S  1990'S    X 2   • KNEE SURGERY Left     X8   • LEG SURGERY      from spider bites   • NECK SURGERY      x 3 (1198, 2003, 20125). Plated and fused   • PROSTATE RADIOACTIVE SEED IMPLANT  01/2017   • PROSTATE SURGERY     • SHOULDER ARTHROSCOPY Right    • SHOULDER ARTHROSCOPY W/ ROTATOR CUFF REPAIR Right 2/24/2021    Procedure: RIGHT SHOULDER ARTHROSCOPY WITH ROTATOR CUFF REPAIR AND ACROMIOPLASTY WITH DEBRIDEMENT  FOLLOWED BY OPEN DISTAL CLAVICLE EXCISION INTRASCALEN BLOCK;  Surgeon: Ramon Sun MD;  Location: [facility] OR OSC;  Service: Orthopedics;  Laterality: Right;   • TESTICLE SURGERY Right     Hydrocele repair    • TONSILLECTOMY      • TOTAL KNEE ARTHROPLASTY Left 3/5/2018    Procedure: LEFT TOTAL KNEE ARTHROPLASTY;  Surgeon: Vinod Crooks MD;  Location: [facility] OR;  Service:    • TOTAL KNEE ARTHROPLASTY REVISION Left        Social History     Socioeconomic History   • Marital status:    Tobacco Use   • Smoking status: Former Smoker     Packs/day: 1.00     Types: Pipe, Cigarettes     Quit date:      Years since quittin.8   • Smokeless tobacco: Current User     Types: Snuff   • Tobacco comment: quit 2020   Vaping Use   • Vaping Use: Never used   Substance and Sexual Activity   • Alcohol use: Never     Comment: daily caffiene   • Drug use: No   • Sexual activity: Defer       Family History   Problem Relation Age of Onset   • Stroke Mother    • Hypertension Mother    • Alzheimer's disease Father    • Dementia Father    • Hypertension Father    • Alcohol abuse Sister    • Hypertension Sister    • No Known Problems Brother    • Malig Hyperthermia Neg Hx    • Colon cancer Neg Hx    • Colon polyps Neg Hx        The following portion of the patient's history were reviewed and updated as appropriate: past medical history, past surgical history, past social history, past family history, allergies, current medications, and problem list.    Review of Systems   Constitutional: Positive for malaise/fatigue. Negative for diaphoresis and fever.   HENT: Negative for congestion, hearing loss, hoarse voice, nosebleeds and sore throat.    Eyes: Negative for photophobia, vision loss in left eye, vision loss in right eye and visual disturbance.   Cardiovascular: Positive for leg swelling (intermittent legs/feet/fingers and hands). Negative for chest pain, dyspnea on exertion, irregular heartbeat, near-syncope, orthopnea, paroxysmal nocturnal dyspnea and syncope. Palpitations: intermittent, usually when he is quiet.   Respiratory: Negative for cough, hemoptysis, shortness of breath, sleep disturbances due to breathing, snoring, sputum  production and wheezing.    Endocrine: Negative for cold intolerance, heat intolerance, polydipsia, polyphagia and polyuria.   Hematologic/Lymphatic: Negative for bleeding problem. Does not bruise/bleed easily.   Skin: Negative for color change, dry skin, poor wound healing, rash and suspicious lesions.   Musculoskeletal: Positive for back pain. Negative for arthritis, falls, gout, joint pain, joint swelling, muscle cramps, muscle weakness and myalgias.   Gastrointestinal: Negative for bloating, abdominal pain, constipation, diarrhea, dysphagia, melena, nausea and vomiting.   Neurological: Positive for dizziness (intermittent, mostly with head flat.) and paresthesias (neuropathy). Negative for excessive daytime sleepiness, headaches, light-headedness, loss of balance, numbness, seizures, vertigo and weakness.   Psychiatric/Behavioral: Negative for depression, memory loss and substance abuse. The patient is not nervous/anxious.        Allergies   Allergen Reactions   • Fexofenadine-Pseudoephed Er Other (See Comments)     TACHYCARDIA   • Metformin Diarrhea   • Shellfish-Derived Products Palpitations         Current Outpatient Medications:   •  allopurinol (ZYLOPRIM) 300 MG tablet, Take 300 mg by mouth Daily., Disp: , Rfl:   •  amLODIPine (NORVASC) 2.5 MG tablet, Take 1 tablet by mouth Daily., Disp: 90 tablet, Rfl: 3  •  atorvastatin (LIPITOR) 10 MG tablet, Take 10 mg by mouth Daily., Disp: , Rfl:   •  DULoxetine (CYMBALTA) 60 MG capsule, Take 60 mg by mouth 2 (Two) Times a Day., Disp: , Rfl:   •  linagliptin (TRADJENTA) 5 MG tablet tablet, Take 1 tablet by mouth Daily., Disp: 90 tablet, Rfl: 1  •  lisinopril (PRINIVIL,ZESTRIL) 2.5 MG tablet, Take 1 tablet by mouth Every Morning., Disp: 90 tablet, Rfl: 3  •  oxyCODONE-acetaminophen (PERCOCET)  MG per tablet, Take 0.5-1 tablets by mouth Every 8 (Eight) Hours As Needed for Severe Pain . (Patient taking differently: Take 1 tablet by mouth Every 6 (Six) Hours As  "Needed for Severe Pain .), Disp: 90 tablet, Rfl: 0  •  pregabalin (Lyrica) 150 MG capsule, Take 150 mg by mouth Daily., Disp: , Rfl:   •  tiZANidine (ZANAFLEX) 4 MG tablet, Take 1 tablet by mouth 2 (Two) Times a Day As Needed for Muscle Spasms., Disp: 60 tablet, Rfl: 1        Objective:     Vitals:    11/16/21 1135 11/16/21 1145   BP: 150/92 130/78   Pulse: 62    Resp: 16    SpO2: 96%    Weight: 104 kg (229 lb)    Height: 182.9 cm (72\")      Body mass index is 31.06 kg/m².      Constitutional:       General: Not in acute distress.     Appearance: Well-developed.   Eyes:      General:         Right eye: No discharge.         Left eye: No discharge.      Conjunctiva/sclera: Conjunctivae normal.   HENT:      Head: Normocephalic and atraumatic.      Right Ear: External ear normal.      Left Ear: External ear normal.      Nose: Nose normal.   Neck:      Thyroid: No thyromegaly.      Vascular: No JVD.      Trachea: No tracheal deviation.      Lymphadenopathy: No cervical adenopathy.   Pulmonary:      Effort: Pulmonary effort is normal. No respiratory distress.      Breath sounds: Normal breath sounds. No wheezing. No rales.   Chest:      Chest wall: Not tender to palpatation.   Cardiovascular:      Normal rate. Regular rhythm.      No gallop.   Pulses:     Intact distal pulses.   Edema:     Peripheral edema absent.   Abdominal:      General: There is no distension.      Palpations: Abdomen is soft.      Tenderness: There is no abdominal tenderness.   Musculoskeletal:         General: No tenderness or deformity.      Cervical back: Normal range of motion and neck supple. Skin:     General: Skin is warm and dry.      Findings: No erythema or rash.   Neurological:      Mental Status: Alert and oriented to person, place, and time.      Coordination: Coordination normal.      Gait: Gait abnormal (walks with a cane).   Psychiatric:         Attention and Perception: Attention normal.         Mood and Affect: Mood normal.         " Speech: Speech normal.         Behavior: Behavior normal. Behavior is cooperative.         Thought Content: Thought content normal.         Cognition and Memory: Cognition normal.         Judgment: Judgment normal.               ECG 12 Lead    Date/Time: 11/16/2021 11:42 AM  Performed by: Chelsy Steele APRN  Authorized by: Chelsy Steele APRN   Comparison: compared with previous ECG from 11/10/2021  Similar to previous ECG  Rhythm: sinus rhythm  Rate: normal  Conduction: 1st degree AV block  ST Segments: ST segments normal  T Waves: T waves normal  QRS axis: normal    Clinical impression: normal ECG              Assessment:       Diagnosis Plan   1. Essential (primary) hypertension  ECG 12 Lead    lisinopril (PRINIVIL,ZESTRIL) 2.5 MG tablet   2. Mixed hyperlipidemia     3. CECILIA (obstructive sleep apnea)     4. Congenital heart defect            Plan:       1.  Hypertension-controlled    2.  Mixed hyperlipidemia-continue lipid-lowering therapy.  He is currently on atorvastatin 10 mg daily.    3.  CECILIA-unable to tolerate mask    4.  Incontinence-due to multiple back surgeries and nerve damage.  He received a bladder stimulator on 10/28/2021.    5.  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  We will discuss with Dr. Holly and proceed accordingly.        As always, it has been a pleasure to participate in your patient's care. Thank you.       Sincerely,       KAYLEEN Napier      Current Outpatient Medications:   •  allopurinol (ZYLOPRIM) 300 MG tablet, Take 300 mg by mouth Daily., Disp: , Rfl:   •  amLODIPine (NORVASC) 2.5 MG tablet, Take 1 tablet by mouth Daily., Disp: 90 tablet, Rfl: 3  •  atorvastatin (LIPITOR) 10 MG tablet, Take 10 mg by mouth Daily., Disp: , Rfl:   •  DULoxetine (CYMBALTA) 60 MG capsule, Take 60 mg by mouth 2 (Two) Times a Day.,  Disp: , Rfl:   •  linagliptin (TRADJENTA) 5 MG tablet tablet, Take 1 tablet by mouth Daily., Disp: 90 tablet, Rfl: 1  •  lisinopril (PRINIVIL,ZESTRIL) 2.5 MG tablet, Take 1 tablet by mouth Every Morning., Disp: 90 tablet, Rfl: 3  •  oxyCODONE-acetaminophen (PERCOCET)  MG per tablet, Take 0.5-1 tablets by mouth Every 8 (Eight) Hours As Needed for Severe Pain . (Patient taking differently: Take 1 tablet by mouth Every 6 (Six) Hours As Needed for Severe Pain .), Disp: 90 tablet, Rfl: 0  •  pregabalin (Lyrica) 150 MG capsule, Take 150 mg by mouth Daily., Disp: , Rfl:   •  tiZANidine (ZANAFLEX) 4 MG tablet, Take 1 tablet by mouth 2 (Two) Times a Day As Needed for Muscle Spasms., Disp: 60 tablet, Rfl: 1    Dictated utilizing Dragon dictation

## 2021-11-18 ENCOUNTER — TELEPHONE (OUTPATIENT)
Dept: CARDIOLOGY | Facility: CLINIC | Age: 56
End: 2021-11-18

## 2021-11-18 DIAGNOSIS — Q24.9 CONGENITAL HEART DEFECT: Primary | ICD-10-CM

## 2021-11-18 DIAGNOSIS — Q24.5 ANOMALOUS CORONARY ARTERY ORIGIN: Primary | ICD-10-CM

## 2021-11-18 RX ORDER — METOPROLOL SUCCINATE 25 MG/1
25 TABLET, EXTENDED RELEASE ORAL DAILY
Qty: 2 TABLET | Refills: 0 | Status: SHIPPED | OUTPATIENT
Start: 2021-11-18 | End: 2022-10-10 | Stop reason: ALTCHOICE

## 2021-11-18 NOTE — TELEPHONE ENCOUNTER
Left a message that Dr. Geiger did speak to Dr. Brand and that he has no limitations her imaging.  I have ordered the CTA coronaries and metoprolol tartrate 25 mg.  He will take 1 tablet the night prior to his testing and 1 tablet the morning of his testing

## 2021-11-18 NOTE — TELEPHONE ENCOUNTER
----- Message from Sima Hubbard sent at 11/18/2021 10:41 AM EST -----  [phone] for Dr. Gonzalez to call Dr. Holly regarding CT scan or MRI and bladder stimulator placed by Dr. Gonzalez on 10/28/21.    Thanks    ----- Message -----  From: Chelsy Steele APRN  Sent: 11/18/2021   8:38 AM EST  To: Sima Gonzalez.  thanks  ----- Message -----  From: Sima Hubbard  Sent: 11/18/2021   8:33 AM EST  To: KAYLEEN Martinez    His urologist is Dr. Jose Barron, Dr. Gonzalez placed the interstim for the bladder.  Which doc should I leave the message with, Dr. Barron or Carlos?   Thanks so much    ----- Message -----  From: Chelsy Steele APRN  Sent: 11/17/2021   5:37 PM EST  To: Sima Hubbard    [employer] [phone]  ----- Message -----  From: Sima Hubbard  Sent: 11/17/2021   3:34 PM EST  To: KAYLEEN Martinez    The patient had the stimulator placed by Dr. Frank Gonzalez of [facility] on 10/28/2021 @ [facility] in [place].  I do not have Dr. Holly's phone #; please forward to me and I will place the call to [facility] for Dr. Holly.   Thanks      ----- Message -----  From: Chelsy Steele APRN  Sent: 11/16/2021   4:51 PM EST  To: Sima Hubbard    Which you please call patient and find out who his urologist is and who put in his bladder stimulator, then call that physician's office and give him Dr. Holly's cell phone so that she can discuss it with them?  Thank you so much  ----- Message -----  From: Dick Holly MD  Sent: 11/16/2021   4:18 PM EST  To: KAYLEEN Martinez    This doesn't make sense. Usually a CT is not an issue.  Will you have the office call his urologist and give him my cell to discuss?    Thank you  sheila  ----- Message -----  From: Chelsy Steele APRN  Sent: 11/16/2021   1:40 PM EST  To: Dick Holly MD    Patient received bladder stimulator 10/28/2021. His patient information and states that he cannot have CAT scans but he is full  body acceptable for MRI.  Would a cardiac MRI show what you need to see?

## 2021-11-18 NOTE — TELEPHONE ENCOUNTER
I s/w Dr Gonzalez by phone this morning. He has no limitations regarding imagine.  Please see order for CTA coronary.    lolita yuan

## 2021-12-10 ENCOUNTER — HOSPITAL ENCOUNTER (OUTPATIENT)
Dept: CT IMAGING | Facility: HOSPITAL | Age: 56
Discharge: HOME | End: 2021-12-10
Admitting: NURSE PRACTITIONER

## 2021-12-10 VITALS
OXYGEN SATURATION: 95 % | HEART RATE: 54 BPM | SYSTOLIC BLOOD PRESSURE: 134 MMHG | RESPIRATION RATE: 18 BRPM | DIASTOLIC BLOOD PRESSURE: 86 MMHG | TEMPERATURE: 97.8 F

## 2021-12-10 DIAGNOSIS — Q24.9 CONGENITAL HEART DEFECT: ICD-10-CM

## 2021-12-10 LAB
CREAT BLDA-MCNC: 0.8 MG/DL (ref 0.6–1.3)
QT INTERVAL: 450 MS

## 2021-12-10 PROCEDURE — 82565 ASSAY OF CREATININE: CPT

## 2021-12-10 PROCEDURE — 75574 CT ANGIO HRT W/3D IMAGE: CPT

## 2021-12-10 PROCEDURE — 93005 ELECTROCARDIOGRAM TRACING: CPT | Performed by: NURSE PRACTITIONER

## 2021-12-10 PROCEDURE — 75574 CT ANGIO HRT W/3D IMAGE: CPT | Performed by: INTERNAL MEDICINE

## 2021-12-10 PROCEDURE — 0 IOPAMIDOL PER 1 ML: Performed by: NURSE PRACTITIONER

## 2021-12-10 RX ADMIN — IOPAMIDOL 95 ML: 755 INJECTION, SOLUTION INTRAVENOUS at 11:08

## 2021-12-10 NOTE — NURSING NOTE
Per ELENI at [employer] [phone], OK to proceed with CTA with pt's model of bladder stimulator

## 2021-12-10 NOTE — NURSING NOTE
Pt in xray triage for CTA.  Pt wearing mask and RN wearing mask and eye protection for all pt interactions.

## 2021-12-12 ENCOUNTER — DOCUMENTATION (OUTPATIENT)
Dept: CARDIOLOGY | Facility: CLINIC | Age: 56
End: 2021-12-12

## 2021-12-12 NOTE — PROGRESS NOTES
Cardiac CTA with morphology  12/10/21  Reason for the exam: CP    Calcium score is 0 Agatston units.  This is between the 1-25 percentile for men between the ages of 55-59 years old.    Heart rate 47 bpm.  Left ventricular end-diastolic volume 109 mL.  Left ventricular end-systolic volume 17 mL.  Ejection fraction 84%.  Stroke volume 92 mL.  Cardiac output 4316 mL/m    The right atrium is normal in size.  The right ventricle is normal in size.  There is grossly normal right ventricular systolic function.  The pulmonary artery is normal in size.  There are 4 pulmonary veins which enter the left atrium in their expected location.  The left atrial appendage was visualized and is without thrombus.  The intra-atrial septum appeared to be intact.  The left ventricle is normal in size with normal systolic function.  There was no evidence of a left ventricular thrombus.  The intraventricular septum appeared to be intact.  The mitral valve appeared structurally normal.  The aortic valve was trileaflet and appears structurally and functionally normal.  The pulmonic valve appeared structurally normal.  The tricuspid valve appeared structurally normal.  There was no pericardial effusion.  The pericardium appeared normal.    The left main coronary artery came off the left coronary cusp in its anticipated location. There was no evidence of atherosclerotic disease of the left main coronary artery.  There is no evidence of atherosclerotic disease in the LAD.  The circumflex artery is codominant vessel with no evidence of atherosclerotic disease.  The circumflex is anomalous coming off the right coronary cusp and running posterior to the aorta.  The right coronary artery is  codominant vessel with no evidence of atherosclerotic disease.    Conclusions:  1.  Calcium score is 0 Agatston units.  2.  Structurally normal heart.  3.  The circumflex is anomalous and comes off the right coronary cusp.    Liset Schultz MD  12/12/21

## 2021-12-14 NOTE — PROGRESS NOTES
Discussed the results with Dr. Holly.  She reviewed the images as well.  I called and talked with the patient that yes he does have the anomalous circumflex artery that is noted on the scan that is relatively benign.  I also discussed the noncardiac findings with the patient.  He voiced understanding.

## 2022-08-03 ENCOUNTER — HOSPITAL ENCOUNTER (OUTPATIENT)
Dept: GENERAL RADIOLOGY | Facility: HOSPITAL | Age: 57
Discharge: HOME | End: 2022-08-03

## 2022-08-03 ENCOUNTER — TRANSCRIBE ORDERS (OUTPATIENT)
Dept: ADMINISTRATIVE | Facility: HOSPITAL | Age: 57
End: 2022-08-03

## 2022-08-03 DIAGNOSIS — M54.9 BACK PAIN, UNSPECIFIED BACK LOCATION, UNSPECIFIED BACK PAIN LATERALITY, UNSPECIFIED CHRONICITY: Primary | ICD-10-CM

## 2022-08-03 DIAGNOSIS — M54.9 BACK PAIN, UNSPECIFIED BACK LOCATION, UNSPECIFIED BACK PAIN LATERALITY, UNSPECIFIED CHRONICITY: ICD-10-CM

## 2022-08-03 DIAGNOSIS — M54.2 NECK PAIN: ICD-10-CM

## 2022-08-03 PROCEDURE — 72040 X-RAY EXAM NECK SPINE 2-3 VW: CPT

## 2022-08-03 PROCEDURE — 72100 X-RAY EXAM L-S SPINE 2/3 VWS: CPT

## 2022-08-16 ENCOUNTER — TRANSCRIBE ORDERS (OUTPATIENT)
Dept: ADMINISTRATIVE | Facility: HOSPITAL | Age: 57
End: 2022-08-16

## 2022-08-16 DIAGNOSIS — M54.16 CHRONIC RADICULAR LOW BACK PAIN: Primary | ICD-10-CM

## 2022-08-16 DIAGNOSIS — G89.29 CHRONIC RADICULAR LOW BACK PAIN: Primary | ICD-10-CM

## 2022-08-16 DIAGNOSIS — G89.29 CHRONIC NECK PAIN: ICD-10-CM

## 2022-08-16 DIAGNOSIS — M54.2 CHRONIC NECK PAIN: ICD-10-CM

## 2022-09-02 ENCOUNTER — HOSPITAL ENCOUNTER (OUTPATIENT)
Dept: MRI IMAGING | Facility: HOSPITAL | Age: 57
End: 2022-09-02

## 2022-09-19 DIAGNOSIS — I10 ESSENTIAL (PRIMARY) HYPERTENSION: ICD-10-CM

## 2022-09-20 RX ORDER — LISINOPRIL 2.5 MG/1
TABLET ORAL
Qty: 90 TABLET | Refills: 0 | Status: SHIPPED | OUTPATIENT
Start: 2022-09-20 | End: 2022-12-19

## 2022-09-29 ENCOUNTER — HOSPITAL ENCOUNTER (OUTPATIENT)
Dept: MRI IMAGING | Facility: HOSPITAL | Age: 57
Discharge: HOME | End: 2022-09-29
Admitting: NEUROLOGICAL SURGERY

## 2022-09-29 DIAGNOSIS — G89.29 CHRONIC NECK PAIN: ICD-10-CM

## 2022-09-29 DIAGNOSIS — M54.2 CHRONIC NECK PAIN: ICD-10-CM

## 2022-09-29 PROCEDURE — 72141 MRI NECK SPINE W/O DYE: CPT

## 2022-10-10 ENCOUNTER — APPOINTMENT (OUTPATIENT)
Dept: OTHER | Facility: HOSPITAL | Age: 57
End: 2022-10-10

## 2022-10-10 ENCOUNTER — HOSPITAL ENCOUNTER (OUTPATIENT)
Dept: MRI IMAGING | Facility: HOSPITAL | Age: 57
End: 2022-10-10

## 2022-10-10 ENCOUNTER — OFFICE VISIT (OUTPATIENT)
Dept: CARDIOLOGY | Facility: CLINIC | Age: 57
End: 2022-10-10

## 2022-10-10 ENCOUNTER — APPOINTMENT (OUTPATIENT)
Dept: MRI IMAGING | Facility: HOSPITAL | Age: 57
End: 2022-10-10

## 2022-10-10 ENCOUNTER — HOSPITAL ENCOUNTER (OUTPATIENT)
Dept: MRI IMAGING | Facility: HOSPITAL | Age: 57
Discharge: HOME | End: 2022-10-10

## 2022-10-10 VITALS
SYSTOLIC BLOOD PRESSURE: 160 MMHG | HEIGHT: 72 IN | WEIGHT: 236 LBS | HEART RATE: 60 BPM | DIASTOLIC BLOOD PRESSURE: 90 MMHG | BODY MASS INDEX: 31.97 KG/M2

## 2022-10-10 DIAGNOSIS — R55 SYNCOPE AND COLLAPSE: ICD-10-CM

## 2022-10-10 DIAGNOSIS — E78.2 MIXED HYPERLIPIDEMIA: ICD-10-CM

## 2022-10-10 DIAGNOSIS — R42 DIZZINESS: Primary | ICD-10-CM

## 2022-10-10 DIAGNOSIS — G47.33 OSA (OBSTRUCTIVE SLEEP APNEA): ICD-10-CM

## 2022-10-10 DIAGNOSIS — I10 ESSENTIAL (PRIMARY) HYPERTENSION: ICD-10-CM

## 2022-10-10 DIAGNOSIS — M54.16 CHRONIC RADICULAR LOW BACK PAIN: ICD-10-CM

## 2022-10-10 DIAGNOSIS — Z09 FOLLOW-UP EXAM: ICD-10-CM

## 2022-10-10 DIAGNOSIS — G89.29 CHRONIC RADICULAR LOW BACK PAIN: ICD-10-CM

## 2022-10-10 DIAGNOSIS — Q24.9 CONGENITAL HEART DEFECT: ICD-10-CM

## 2022-10-10 LAB — CREAT BLDA-MCNC: 0.7 MG/DL (ref 0.6–1.3)

## 2022-10-10 PROCEDURE — 72158 MRI LUMBAR SPINE W/O & W/DYE: CPT

## 2022-10-10 PROCEDURE — 0 GADOBENATE DIMEGLUMINE 529 MG/ML SOLUTION: Performed by: NEUROLOGICAL SURGERY

## 2022-10-10 PROCEDURE — 99214 OFFICE O/P EST MOD 30 MIN: CPT | Performed by: NURSE PRACTITIONER

## 2022-10-10 PROCEDURE — A9577 INJ MULTIHANCE: HCPCS | Performed by: NEUROLOGICAL SURGERY

## 2022-10-10 PROCEDURE — 82565 ASSAY OF CREATININE: CPT

## 2022-10-10 RX ADMIN — GADOBENATE DIMEGLUMINE 20 ML: 529 INJECTION, SOLUTION INTRAVENOUS at 09:40
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
## 2022-10-11 PROBLEM — R07.2 PRECORDIAL PAIN: Status: RESOLVED | Noted: 2021-10-25 | Resolved: 2022-10-11

## 2022-10-11 PROCEDURE — 93000 ELECTROCARDIOGRAM COMPLETE: CPT | Performed by: NURSE PRACTITIONER

## 2022-12-13 DIAGNOSIS — I10 ESSENTIAL (PRIMARY) HYPERTENSION: ICD-10-CM

## 2022-12-19 RX ORDER — LISINOPRIL 2.5 MG/1
TABLET ORAL
Qty: 90 TABLET | Refills: 0 | Status: SHIPPED | OUTPATIENT
Start: 2022-12-19

## 2023-03-08 DIAGNOSIS — I10 ESSENTIAL (PRIMARY) HYPERTENSION: ICD-10-CM

## 2023-03-14 RX ORDER — LISINOPRIL 2.5 MG/1
TABLET ORAL
Qty: 90 TABLET | Refills: 3 | OUTPATIENT
Start: 2023-03-14

## 2023-08-16 ENCOUNTER — TRANSCRIBE ORDERS (OUTPATIENT)
Dept: ADMINISTRATIVE | Facility: HOSPITAL | Age: 58
End: 2023-08-16
Payer: COMMERCIAL

## 2023-08-16 DIAGNOSIS — R31.0 GROSS HEMATURIA: Primary | ICD-10-CM

## 2023-08-24 ENCOUNTER — HOSPITAL ENCOUNTER (OUTPATIENT)
Dept: CT IMAGING | Facility: HOSPITAL | Age: 58
Discharge: HOME | End: 2023-08-24
Admitting: UROLOGY
Payer: COMMERCIAL

## 2023-08-24 DIAGNOSIS — R31.0 GROSS HEMATURIA: ICD-10-CM

## 2023-08-24 PROCEDURE — 25510000001 IOPAMIDOL 61 % SOLUTION: Performed by: UROLOGY

## 2023-08-24 PROCEDURE — 74178 CT ABD&PLV WO CNTR FLWD CNTR: CPT

## 2023-08-24 RX ADMIN — IOPAMIDOL 100 ML: 612 INJECTION, SOLUTION INTRAVENOUS at 13:28

## 2023-11-15 ENCOUNTER — LAB REQUISITION (OUTPATIENT)
Dept: LAB | Facility: HOSPITAL | Age: 58
End: 2023-11-15
Payer: COMMERCIAL

## 2023-11-15 DIAGNOSIS — N40.1 BENIGN PROSTATIC HYPERPLASIA WITH LOWER URINARY TRACT SYMPTOMS: ICD-10-CM

## 2023-11-15 PROCEDURE — 88305 TISSUE EXAM BY PATHOLOGIST: CPT | Performed by: UROLOGY

## 2023-11-16 LAB
LAB AP CASE REPORT: NORMAL
PATH REPORT.FINAL DX SPEC: NORMAL
PATH REPORT.GROSS SPEC: NORMAL

## 2023-12-31 ENCOUNTER — APPOINTMENT (OUTPATIENT)
Dept: CT IMAGING | Facility: HOSPITAL | Age: 58
End: 2023-12-31
Payer: MEDICARE

## 2023-12-31 ENCOUNTER — HOSPITAL ENCOUNTER (INPATIENT)
Facility: HOSPITAL | Age: 58
LOS: 1 days | Discharge: HOME | End: 2024-01-02
Attending: EMERGENCY MEDICINE | Admitting: INTERNAL MEDICINE
Payer: MEDICARE

## 2023-12-31 DIAGNOSIS — R31.0 GROSS HEMATURIA: Primary | ICD-10-CM

## 2023-12-31 DIAGNOSIS — R10.30 LOWER ABDOMINAL PAIN: ICD-10-CM

## 2023-12-31 DIAGNOSIS — I10 ESSENTIAL (PRIMARY) HYPERTENSION: ICD-10-CM

## 2023-12-31 LAB
ALBUMIN SERPL-MCNC: 4.2 G/DL (ref 3.5–5.2)
ALBUMIN/GLOB SERPL: 1.8 G/DL
ALP SERPL-CCNC: 100 U/L (ref 39–117)
ALT SERPL W P-5'-P-CCNC: 14 U/L (ref 1–41)
ANION GAP SERPL CALCULATED.3IONS-SCNC: 12 MMOL/L (ref 5–15)
AST SERPL-CCNC: 16 U/L (ref 1–40)
BACTERIA UR QL AUTO: ABNORMAL /HPF
BASOPHILS # BLD AUTO: 0.03 10*3/MM3 (ref 0–0.2)
BASOPHILS NFR BLD AUTO: 0.4 % (ref 0–1.5)
BILIRUB SERPL-MCNC: 0.9 MG/DL (ref 0–1.2)
BILIRUB UR QL STRIP: NEGATIVE
BUN SERPL-MCNC: 15 MG/DL (ref 6–20)
BUN/CREAT SERPL: 14.4 (ref 7–25)
CALCIUM SPEC-SCNC: 9.3 MG/DL (ref 8.6–10.5)
CHLORIDE SERPL-SCNC: 99 MMOL/L (ref 98–107)
CLARITY UR: ABNORMAL
CO2 SERPL-SCNC: 26 MMOL/L (ref 22–29)
COLOR UR: YELLOW
CREAT SERPL-MCNC: 1.04 MG/DL (ref 0.76–1.27)
DEPRECATED RDW RBC AUTO: 45.6 FL (ref 37–54)
EGFRCR SERPLBLD CKD-EPI 2021: 83.7 ML/MIN/1.73
EOSINOPHIL # BLD AUTO: 0.11 10*3/MM3 (ref 0–0.4)
EOSINOPHIL NFR BLD AUTO: 1.3 % (ref 0.3–6.2)
ERYTHROCYTE [DISTWIDTH] IN BLOOD BY AUTOMATED COUNT: 13.7 % (ref 12.3–15.4)
GLOBULIN UR ELPH-MCNC: 2.3 GM/DL
GLUCOSE SERPL-MCNC: 357 MG/DL (ref 65–99)
GLUCOSE UR STRIP-MCNC: ABNORMAL MG/DL
HCT VFR BLD AUTO: 43.3 % (ref 37.5–51)
HGB BLD-MCNC: 14.2 G/DL (ref 13–17.7)
HGB UR QL STRIP.AUTO: ABNORMAL
HYALINE CASTS UR QL AUTO: ABNORMAL /LPF
IMM GRANULOCYTES # BLD AUTO: 0.02 10*3/MM3 (ref 0–0.05)
IMM GRANULOCYTES NFR BLD AUTO: 0.2 % (ref 0–0.5)
KETONES UR QL STRIP: NEGATIVE
LEUKOCYTE ESTERASE UR QL STRIP.AUTO: NEGATIVE
LYMPHOCYTES # BLD AUTO: 2.55 10*3/MM3 (ref 0.7–3.1)
LYMPHOCYTES NFR BLD AUTO: 30.4 % (ref 19.6–45.3)
MCH RBC QN AUTO: 29.5 PG (ref 26.6–33)
MCHC RBC AUTO-ENTMCNC: 32.8 G/DL (ref 31.5–35.7)
MCV RBC AUTO: 90 FL (ref 79–97)
MONOCYTES # BLD AUTO: 0.61 10*3/MM3 (ref 0.1–0.9)
MONOCYTES NFR BLD AUTO: 7.3 % (ref 5–12)
NEUTROPHILS NFR BLD AUTO: 5.06 10*3/MM3 (ref 1.7–7)
NEUTROPHILS NFR BLD AUTO: 60.4 % (ref 42.7–76)
NITRITE UR QL STRIP: NEGATIVE
NRBC BLD AUTO-RTO: 0.1 /100 WBC (ref 0–0.2)
PH UR STRIP.AUTO: 6.5 [PH] (ref 5–8)
PLATELET # BLD AUTO: 207 10*3/MM3 (ref 140–450)
PMV BLD AUTO: 9.6 FL (ref 6–12)
POTASSIUM SERPL-SCNC: 4.4 MMOL/L (ref 3.5–5.2)
PROT SERPL-MCNC: 6.5 G/DL (ref 6–8.5)
PROT UR QL STRIP: ABNORMAL
RBC # BLD AUTO: 4.81 10*6/MM3 (ref 4.14–5.8)
RBC # UR STRIP: ABNORMAL /HPF
REF LAB TEST METHOD: ABNORMAL
SODIUM SERPL-SCNC: 137 MMOL/L (ref 136–145)
SP GR UR STRIP: >=1.03 (ref 1–1.03)
SQUAMOUS #/AREA URNS HPF: ABNORMAL /HPF
UROBILINOGEN UR QL STRIP: ABNORMAL
WBC # UR STRIP: ABNORMAL /HPF
WBC NRBC COR # BLD AUTO: 8.38 10*3/MM3 (ref 3.4–10.8)

## 2023-12-31 PROCEDURE — 25810000003 SODIUM CHLORIDE 0.9 % SOLUTION: Performed by: EMERGENCY MEDICINE

## 2023-12-31 PROCEDURE — 85730 THROMBOPLASTIN TIME PARTIAL: CPT | Performed by: EMERGENCY MEDICINE

## 2023-12-31 PROCEDURE — 99285 EMERGENCY DEPT VISIT HI MDM: CPT

## 2023-12-31 PROCEDURE — 25010000002 ONDANSETRON PER 1 MG: Performed by: EMERGENCY MEDICINE

## 2023-12-31 PROCEDURE — 80053 COMPREHEN METABOLIC PANEL: CPT | Performed by: EMERGENCY MEDICINE

## 2023-12-31 PROCEDURE — 74176 CT ABD & PELVIS W/O CONTRAST: CPT

## 2023-12-31 PROCEDURE — 25010000002 HYDROMORPHONE PER 4 MG: Performed by: EMERGENCY MEDICINE

## 2023-12-31 PROCEDURE — 81001 URINALYSIS AUTO W/SCOPE: CPT | Performed by: EMERGENCY MEDICINE

## 2023-12-31 PROCEDURE — 85025 COMPLETE CBC W/AUTO DIFF WBC: CPT | Performed by: EMERGENCY MEDICINE

## 2023-12-31 PROCEDURE — 85610 PROTHROMBIN TIME: CPT | Performed by: EMERGENCY MEDICINE

## 2023-12-31 RX ORDER — HYDROMORPHONE HYDROCHLORIDE 1 MG/ML
0.5 INJECTION, SOLUTION INTRAMUSCULAR; INTRAVENOUS; SUBCUTANEOUS ONCE
Status: COMPLETED | OUTPATIENT
Start: 2023-12-31 | End: 2023-12-31

## 2023-12-31 RX ORDER — ONDANSETRON 2 MG/ML
4 INJECTION INTRAMUSCULAR; INTRAVENOUS ONCE
Status: COMPLETED | OUTPATIENT
Start: 2023-12-31 | End: 2023-12-31

## 2023-12-31 RX ADMIN — ONDANSETRON HYDROCHLORIDE 4 MG: 2 INJECTION, SOLUTION INTRAMUSCULAR; INTRAVENOUS at 23:31

## 2023-12-31 RX ADMIN — HYDROMORPHONE HYDROCHLORIDE 0.5 MG: 1 INJECTION, SOLUTION INTRAMUSCULAR; INTRAVENOUS; SUBCUTANEOUS at 23:31

## 2023-12-31 RX ADMIN — HYDROMORPHONE HYDROCHLORIDE 0.5 MG: 1 INJECTION, SOLUTION INTRAMUSCULAR; INTRAVENOUS; SUBCUTANEOUS at 23:47

## 2023-12-31 RX ADMIN — SODIUM CHLORIDE 1000 ML: 9 INJECTION, SOLUTION INTRAVENOUS at 23:31

## 2024-01-01 PROBLEM — R31.0 GROSS HEMATURIA: Status: ACTIVE | Noted: 2024-01-01

## 2024-01-01 PROBLEM — E78.5 HLD (HYPERLIPIDEMIA): Status: ACTIVE | Noted: 2024-01-01

## 2024-01-01 PROBLEM — G47.33 OSA (OBSTRUCTIVE SLEEP APNEA): Status: ACTIVE | Noted: 2024-01-01

## 2024-01-01 PROBLEM — N40.0 BPH (BENIGN PROSTATIC HYPERPLASIA): Status: ACTIVE | Noted: 2024-01-01

## 2024-01-01 PROBLEM — E11.42 TYPE 2 DIABETES MELLITUS WITH DIABETIC POLYNEUROPATHY, WITHOUT LONG-TERM CURRENT USE OF INSULIN: Status: ACTIVE | Noted: 2024-01-01

## 2024-01-01 PROBLEM — I10 HTN (HYPERTENSION): Status: ACTIVE | Noted: 2024-01-01

## 2024-01-01 LAB
ANION GAP SERPL CALCULATED.3IONS-SCNC: 8.5 MMOL/L (ref 5–15)
APTT PPP: 27 SECONDS (ref 22.7–35.4)
BUN SERPL-MCNC: 15 MG/DL (ref 6–20)
BUN/CREAT SERPL: 18.5 (ref 7–25)
CALCIUM SPEC-SCNC: 8.7 MG/DL (ref 8.6–10.5)
CHLORIDE SERPL-SCNC: 104 MMOL/L (ref 98–107)
CO2 SERPL-SCNC: 27.5 MMOL/L (ref 22–29)
CREAT SERPL-MCNC: 0.81 MG/DL (ref 0.76–1.27)
DEPRECATED RDW RBC AUTO: 43.2 FL (ref 37–54)
EGFRCR SERPLBLD CKD-EPI 2021: 102.8 ML/MIN/1.73
ERYTHROCYTE [DISTWIDTH] IN BLOOD BY AUTOMATED COUNT: 13.6 % (ref 12.3–15.4)
GLUCOSE BLDC GLUCOMTR-MCNC: 170 MG/DL (ref 70–130)
GLUCOSE BLDC GLUCOMTR-MCNC: 182 MG/DL (ref 70–130)
GLUCOSE BLDC GLUCOMTR-MCNC: 192 MG/DL (ref 70–130)
GLUCOSE BLDC GLUCOMTR-MCNC: 219 MG/DL (ref 70–130)
GLUCOSE SERPL-MCNC: 218 MG/DL (ref 65–99)
HCT VFR BLD AUTO: 39 % (ref 37.5–51)
HCT VFR BLD AUTO: 39.5 % (ref 37.5–51)
HGB BLD-MCNC: 12.5 G/DL (ref 13–17.7)
HGB BLD-MCNC: 13 G/DL (ref 13–17.7)
INR PPP: 0.93 (ref 0.9–1.1)
MCH RBC QN AUTO: 27.5 PG (ref 26.6–33)
MCHC RBC AUTO-ENTMCNC: 31.6 G/DL (ref 31.5–35.7)
MCV RBC AUTO: 87 FL (ref 79–97)
PLATELET # BLD AUTO: 204 10*3/MM3 (ref 140–450)
PMV BLD AUTO: 8.9 FL (ref 6–12)
POTASSIUM SERPL-SCNC: 4.3 MMOL/L (ref 3.5–5.2)
PROTHROMBIN TIME: 12.6 SECONDS (ref 11.7–14.2)
RBC # BLD AUTO: 4.54 10*6/MM3 (ref 4.14–5.8)
SODIUM SERPL-SCNC: 140 MMOL/L (ref 136–145)
WBC NRBC COR # BLD AUTO: 8.1 10*3/MM3 (ref 3.4–10.8)

## 2024-01-01 PROCEDURE — 25010000002 CEFTRIAXONE PER 250 MG: Performed by: INTERNAL MEDICINE

## 2024-01-01 PROCEDURE — 36415 COLL VENOUS BLD VENIPUNCTURE: CPT | Performed by: NURSE PRACTITIONER

## 2024-01-01 PROCEDURE — 87086 URINE CULTURE/COLONY COUNT: CPT | Performed by: INTERNAL MEDICINE

## 2024-01-01 PROCEDURE — 25010000002 HYDROMORPHONE 1 MG/ML SOLUTION: Performed by: NURSE PRACTITIONER

## 2024-01-01 PROCEDURE — 80048 BASIC METABOLIC PNL TOTAL CA: CPT | Performed by: NURSE PRACTITIONER

## 2024-01-01 PROCEDURE — 63710000001 INSULIN LISPRO (HUMAN) PER 5 UNITS: Performed by: NURSE PRACTITIONER

## 2024-01-01 PROCEDURE — 85018 HEMOGLOBIN: CPT | Performed by: NURSE PRACTITIONER

## 2024-01-01 PROCEDURE — 25010000002 HYDROMORPHONE 1 MG/ML SOLUTION: Performed by: EMERGENCY MEDICINE

## 2024-01-01 PROCEDURE — 25010000002 HYDROMORPHONE PER 4 MG: Performed by: NURSE PRACTITIONER

## 2024-01-01 PROCEDURE — 25010000002 HYDROMORPHONE 1 MG/ML SOLUTION: Performed by: INTERNAL MEDICINE

## 2024-01-01 PROCEDURE — 82948 REAGENT STRIP/BLOOD GLUCOSE: CPT

## 2024-01-01 PROCEDURE — 85014 HEMATOCRIT: CPT | Performed by: NURSE PRACTITIONER

## 2024-01-01 PROCEDURE — 85027 COMPLETE CBC AUTOMATED: CPT | Performed by: NURSE PRACTITIONER

## 2024-01-01 RX ORDER — ONDANSETRON 2 MG/ML
4 INJECTION INTRAMUSCULAR; INTRAVENOUS EVERY 6 HOURS PRN
Status: DISCONTINUED | OUTPATIENT
Start: 2024-01-01 | End: 2024-01-02 | Stop reason: HOSPADM

## 2024-01-01 RX ORDER — LIDOCAINE HYDROCHLORIDE 20 MG/ML
JELLY TOPICAL AS NEEDED
Status: DISCONTINUED | OUTPATIENT
Start: 2024-01-01 | End: 2024-01-02 | Stop reason: HOSPADM

## 2024-01-01 RX ORDER — ACETAMINOPHEN 160 MG/5ML
650 SOLUTION ORAL EVERY 4 HOURS PRN
Status: DISCONTINUED | OUTPATIENT
Start: 2024-01-01 | End: 2024-01-02 | Stop reason: HOSPADM

## 2024-01-01 RX ORDER — ATORVASTATIN CALCIUM 20 MG/1
40 TABLET, FILM COATED ORAL NIGHTLY
Status: DISCONTINUED | OUTPATIENT
Start: 2024-01-01 | End: 2024-01-02 | Stop reason: HOSPADM

## 2024-01-01 RX ORDER — HYDROMORPHONE HYDROCHLORIDE 1 MG/ML
0.5 INJECTION, SOLUTION INTRAMUSCULAR; INTRAVENOUS; SUBCUTANEOUS
Status: DISCONTINUED | OUTPATIENT
Start: 2024-01-01 | End: 2024-01-01

## 2024-01-01 RX ORDER — ACETAMINOPHEN 650 MG/1
650 SUPPOSITORY RECTAL EVERY 4 HOURS PRN
Status: DISCONTINUED | OUTPATIENT
Start: 2024-01-01 | End: 2024-01-02 | Stop reason: HOSPADM

## 2024-01-01 RX ORDER — OXYCODONE AND ACETAMINOPHEN 10; 325 MG/1; MG/1
1 TABLET ORAL EVERY 6 HOURS PRN
Status: DISCONTINUED | OUTPATIENT
Start: 2024-01-01 | End: 2024-01-01

## 2024-01-01 RX ORDER — INSULIN LISPRO 100 [IU]/ML
2-7 INJECTION, SOLUTION INTRAVENOUS; SUBCUTANEOUS
Status: DISCONTINUED | OUTPATIENT
Start: 2024-01-01 | End: 2024-01-02 | Stop reason: HOSPADM

## 2024-01-01 RX ORDER — IBUPROFEN 600 MG/1
1 TABLET ORAL
Status: DISCONTINUED | OUTPATIENT
Start: 2024-01-01 | End: 2024-01-02 | Stop reason: HOSPADM

## 2024-01-01 RX ORDER — SODIUM CHLORIDE 0.9 % (FLUSH) 0.9 %
10 SYRINGE (ML) INJECTION AS NEEDED
Status: DISCONTINUED | OUTPATIENT
Start: 2024-01-01 | End: 2024-01-02 | Stop reason: HOSPADM

## 2024-01-01 RX ORDER — OXYCODONE AND ACETAMINOPHEN 7.5; 325 MG/1; MG/1
2 TABLET ORAL EVERY 4 HOURS PRN
Status: DISCONTINUED | OUTPATIENT
Start: 2024-01-01 | End: 2024-01-02 | Stop reason: HOSPADM

## 2024-01-01 RX ORDER — DULOXETIN HYDROCHLORIDE 60 MG/1
120 CAPSULE, DELAYED RELEASE ORAL NIGHTLY
Status: DISCONTINUED | OUTPATIENT
Start: 2024-01-01 | End: 2024-01-02 | Stop reason: HOSPADM

## 2024-01-01 RX ORDER — TIZANIDINE 4 MG/1
4 TABLET ORAL 2 TIMES DAILY PRN
Status: DISCONTINUED | OUTPATIENT
Start: 2024-01-01 | End: 2024-01-02 | Stop reason: HOSPADM

## 2024-01-01 RX ORDER — AMLODIPINE BESYLATE 5 MG/1
5 TABLET ORAL NIGHTLY
Status: DISCONTINUED | OUTPATIENT
Start: 2024-01-01 | End: 2024-01-02 | Stop reason: HOSPADM

## 2024-01-01 RX ORDER — CALCIUM CARBONATE 500 MG/1
2 TABLET, CHEWABLE ORAL 2 TIMES DAILY PRN
Status: DISCONTINUED | OUTPATIENT
Start: 2024-01-01 | End: 2024-01-02 | Stop reason: HOSPADM

## 2024-01-01 RX ORDER — SODIUM CHLORIDE 9 MG/ML
40 INJECTION, SOLUTION INTRAVENOUS AS NEEDED
Status: DISCONTINUED | OUTPATIENT
Start: 2024-01-01 | End: 2024-01-02 | Stop reason: HOSPADM

## 2024-01-01 RX ORDER — NICOTINE POLACRILEX 4 MG
15 LOZENGE BUCCAL
Status: DISCONTINUED | OUTPATIENT
Start: 2024-01-01 | End: 2024-01-02 | Stop reason: HOSPADM

## 2024-01-01 RX ORDER — PREGABALIN 100 MG/1
300 CAPSULE ORAL NIGHTLY
Status: DISCONTINUED | OUTPATIENT
Start: 2024-01-01 | End: 2024-01-02 | Stop reason: HOSPADM

## 2024-01-01 RX ORDER — ONDANSETRON 4 MG/1
4 TABLET, ORALLY DISINTEGRATING ORAL EVERY 6 HOURS PRN
Status: DISCONTINUED | OUTPATIENT
Start: 2024-01-01 | End: 2024-01-02 | Stop reason: HOSPADM

## 2024-01-01 RX ORDER — DEXTROSE MONOHYDRATE 25 G/50ML
25 INJECTION, SOLUTION INTRAVENOUS
Status: DISCONTINUED | OUTPATIENT
Start: 2024-01-01 | End: 2024-01-02 | Stop reason: HOSPADM

## 2024-01-01 RX ORDER — ALLOPURINOL 300 MG/1
300 TABLET ORAL NIGHTLY
Status: DISCONTINUED | OUTPATIENT
Start: 2024-01-01 | End: 2024-01-02 | Stop reason: HOSPADM

## 2024-01-01 RX ORDER — ACETAMINOPHEN 325 MG/1
650 TABLET ORAL EVERY 4 HOURS PRN
Status: DISCONTINUED | OUTPATIENT
Start: 2024-01-01 | End: 2024-01-02 | Stop reason: HOSPADM

## 2024-01-01 RX ORDER — LISINOPRIL 2.5 MG/1
2.5 TABLET ORAL NIGHTLY
Status: DISCONTINUED | OUTPATIENT
Start: 2024-01-01 | End: 2024-01-02 | Stop reason: HOSPADM

## 2024-01-01 RX ORDER — SODIUM CHLORIDE 0.9 % (FLUSH) 0.9 %
10 SYRINGE (ML) INJECTION EVERY 12 HOURS SCHEDULED
Status: DISCONTINUED | OUTPATIENT
Start: 2024-01-01 | End: 2024-01-02 | Stop reason: HOSPADM

## 2024-01-01 RX ADMIN — HYDROMORPHONE HYDROCHLORIDE 1.5 MG: 1 INJECTION, SOLUTION INTRAMUSCULAR; INTRAVENOUS; SUBCUTANEOUS at 11:51

## 2024-01-01 RX ADMIN — ALLOPURINOL 300 MG: 300 TABLET ORAL at 20:25

## 2024-01-01 RX ADMIN — HYDROMORPHONE HYDROCHLORIDE 1.5 MG: 1 INJECTION, SOLUTION INTRAMUSCULAR; INTRAVENOUS; SUBCUTANEOUS at 15:50

## 2024-01-01 RX ADMIN — AMLODIPINE BESYLATE 5 MG: 5 TABLET ORAL at 20:25

## 2024-01-01 RX ADMIN — LISINOPRIL 2.5 MG: 2.5 TABLET ORAL at 20:26

## 2024-01-01 RX ADMIN — LIDOCAINE HYDROCHLORIDE: 20 JELLY TOPICAL at 14:36

## 2024-01-01 RX ADMIN — Medication 10 ML: at 20:39

## 2024-01-01 RX ADMIN — HYDROMORPHONE HYDROCHLORIDE 0.5 MG: 1 INJECTION, SOLUTION INTRAMUSCULAR; INTRAVENOUS; SUBCUTANEOUS at 03:46

## 2024-01-01 RX ADMIN — OXYCODONE AND ACETAMINOPHEN 2 TABLET: 7.5; 325 TABLET ORAL at 14:08

## 2024-01-01 RX ADMIN — OXYCODONE AND ACETAMINOPHEN 2 TABLET: 7.5; 325 TABLET ORAL at 20:34

## 2024-01-01 RX ADMIN — ATORVASTATIN CALCIUM 40 MG: 20 TABLET, FILM COATED ORAL at 20:26

## 2024-01-01 RX ADMIN — HYDROMORPHONE HYDROCHLORIDE 1 MG: 1 INJECTION, SOLUTION INTRAMUSCULAR; INTRAVENOUS; SUBCUTANEOUS at 01:21

## 2024-01-01 RX ADMIN — Medication 10 ML: at 08:26

## 2024-01-01 RX ADMIN — CEFTRIAXONE SODIUM 1000 MG: 1 INJECTION, POWDER, FOR SOLUTION INTRAMUSCULAR; INTRAVENOUS at 16:22

## 2024-01-01 RX ADMIN — INSULIN LISPRO 2 UNITS: 100 INJECTION, SOLUTION INTRAVENOUS; SUBCUTANEOUS at 17:47

## 2024-01-01 RX ADMIN — PREGABALIN 300 MG: 100 CAPSULE ORAL at 20:26

## 2024-01-01 RX ADMIN — DULOXETINE HYDROCHLORIDE 120 MG: 60 CAPSULE, DELAYED RELEASE ORAL at 20:26

## 2024-01-01 RX ADMIN — HYDROMORPHONE HYDROCHLORIDE 1 MG: 1 INJECTION, SOLUTION INTRAMUSCULAR; INTRAVENOUS; SUBCUTANEOUS at 08:26

## 2024-01-01 RX ADMIN — HYDROMORPHONE HYDROCHLORIDE 1 MG: 1 INJECTION, SOLUTION INTRAMUSCULAR; INTRAVENOUS; SUBCUTANEOUS at 06:20

## 2024-01-01 RX ADMIN — INSULIN LISPRO 3 UNITS: 100 INJECTION, SOLUTION INTRAVENOUS; SUBCUTANEOUS at 20:45

## 2024-01-01 RX ADMIN — Medication 10 ML: at 02:29

## 2024-01-01 NOTE — ED NOTES
"Nursing report ED to floor  Flaco Miles Sr.  58 y.o.  male    HPI :   Chief Complaint   Patient presents with    Blood in Urine       Admitting doctor:   Stephen Guo MD    Admitting diagnosis:   The primary encounter diagnosis was Gross hematuria. A diagnosis of Lower abdominal pain was also pertinent to this visit.    Code status:   Current Code Status       Date Active Code Status Order ID Comments User Context       Prior            Allergies:   Fexofenadine-pseudoephed er, Metformin, and Shellfish-derived products    Isolation:   No active isolations    Intake and Output    Intake/Output Summary (Last 24 hours) at 1/1/2024 0129  Last data filed at 1/1/2024 0100  Gross per 24 hour   Intake --   Output 500 ml   Net -500 ml       Weight:       12/31/23 2200   Weight: 104 kg (230 lb)       Most recent vitals:   Vitals:    12/31/23 2200 12/31/23 2235 01/01/24 0101   BP:  163/83 154/81   Pulse: 84  62   Resp: 20     Temp: 97 °F (36.1 °C)     TempSrc: Tympanic     SpO2: 98%  97%   Weight: 104 kg (230 lb)     Height: 180.3 cm (71\")         Active LDAs/IV Access:   Lines, Drains & Airways       Active LDAs       Name Placement date Placement time Site Days    Peripheral IV 12/31/23 2316 Right Antecubital 12/31/23 2316  Antecubital  less than 1    Continuous Bladder Irrigation Triple-lumen 22 Fr 01/01/24  0000  Triple-lumen  less than 1                    Labs (abnormal labs have a star):   Labs Reviewed   COMPREHENSIVE METABOLIC PANEL - Abnormal; Notable for the following components:       Result Value    Glucose 357 (*)     All other components within normal limits    Narrative:     GFR Normal >60  Chronic Kidney Disease <60  Kidney Failure <15     URINALYSIS W/ MICROSCOPIC IF INDICATED (NO CULTURE) - Abnormal; Notable for the following components:    Appearance, UA Cloudy (*)     Glucose, UA >=1000 mg/dL (3+) (*)     Blood, UA Large (3+) (*)     Protein, UA >=300 mg/dL (3+) (*)     All other components within " normal limits   URINALYSIS, MICROSCOPIC ONLY - Abnormal; Notable for the following components:    RBC, UA Too Numerous to Count (*)     WBC, UA Unable to determine due to loaded field (*)     Bacteria, UA Unable to determine due to loaded field (*)     Squamous Epithelial Cells, UA Unable to determine due to loaded field (*)     All other components within normal limits   PROTIME-INR - Normal   APTT - Normal   CBC WITH AUTO DIFFERENTIAL - Normal   CBC AND DIFFERENTIAL    Narrative:     The following orders were created for panel order CBC & Differential.  Procedure                               Abnormality         Status                     ---------                               -----------         ------                     CBC Auto Differential[[id number]]        Normal              Final result                 Please view results for these tests on the individual orders.       EKG:   No orders to display       Meds given in ED:   Medications   ondansetron (ZOFRAN) injection 4 mg (4 mg Intravenous Given 23)   HYDROmorphone (DILAUDID) injection 0.5 mg (0.5 mg Intravenous Given 23 233)   sodium chloride 0.9 % bolus 1,000 mL (1,000 mL Intravenous New Bag 23)   HYDROmorphone (DILAUDID) injection 0.5 mg (0.5 mg Intravenous Given 23 2347)   HYDROmorphone (DILAUDID) injection 1 mg (1 mg Intravenous Given 24 0121)       Imaging results:  CT Abdomen Pelvis Without Contrast    Result Date: 2024  Electronically signed by Ashutosh Marks MD on 24 at 0040     Ambulatory status:   - asst 1    Social issues:   Social History     Socioeconomic History    Marital status:    Tobacco Use    Smoking status: Former     Packs/day: 1     Types: Pipe, Cigarettes     Quit date:      Years since quittin.0    Smokeless tobacco: Current     Types: Snuff    Tobacco comments:     quit 2020   Vaping Use    Vaping Use: Never used   Substance and Sexual Activity    Alcohol use:  Never     Comment: daily caffiene    Drug use: No    Sexual activity: Defer       NIH Stroke Scale:       Patricia Chi RN  01/01/24 01:29 EST

## 2024-01-01 NOTE — ED TRIAGE NOTES
"Patient from home via PV reporting blood in urine x 3 days. Patient states \"I am passing clots the size of earthworms\". Patient had prostate and bladder [facility]. Patient states he has a history of passing kidney stones.   "

## 2024-01-01 NOTE — CONSULTS
FIRST UROLOGY CONSULT      Patient Identification:  NAME:  Flaco Miles Sr.  Age:  58 y.o.   Sex:  male   :  1965   MRN:  [id number]     Chief complaint: Gross hematuria and blood clots x 3 days with bladder pain and difficulty urinating.    History of present illness:      58-year-old male with history of BPH s/p HoLEP on 11/15/2023, path negative. Also history of urge urinary incontinence s/p Interstim in 10/2021 and renal stones.     In hospital:  -AVSS, currently on CBI   -WBC - 8.10  -Creat - 0.81  -Hg - 12.5  -UA - cloudy urine, large blood    -CT - 2024 - 1.  Hematoma within the bladder.   2.  Potentially retained wire within the left ureter is unchanged.  3.  Median lobe hypertrophy within the prostate.     3-way montejo placed - drained some old blood and small clot but not irrigating well    Asked to see    Past medical history:  Past Medical History:   Diagnosis Date    Anesthesia complication     WAKES VIOLENTLY.  UNKNOWN REASON    Arthrofibrosis of knee joint, left     Constipation     DDD (degenerative disc disease), cervical     DJD (degenerative joint disease)     Gout     History of colon polyps     History of kidney stones     Hyperlipidemia     Hypertension     Left knee pain     Limited range of motion (ROM) of shoulder     BOTH SHOULDERS    Migraines     Prostate cancer 2017    SEED IMPLANTS    Rotator cuff tear, right     Sleep apnea     NO MACHINE    Type 2 diabetes mellitus        Past surgical history:  Past Surgical History:   Procedure Laterality Date    ABDOMINAL HERNIA REPAIR      X4    ADENOIDECTOMY      ANKLE ARTHROPLASTY Left     Piece of glass removed and cut a tendon     BACK SURGERY      X4    CARDIAC CATHETERIZATION N/A 10/25/2021    Procedure: Left Heart Cath;  Surgeon: Layne Klein MD;  Location: [facility] CATH INVASIVE LOCATION;  Service: Cardiology;  Laterality: N/A;    CARDIAC CATHETERIZATION N/A 10/25/2021    Procedure: Coronary angiography;   Surgeon: Layne Klein MD;  Location: [facility] CATH INVASIVE LOCATION;  Service: Cardiology;  Laterality: N/A;    CARDIAC CATHETERIZATION N/A 10/25/2021    Procedure: Left ventriculography;  Surgeon: Layne Klein MD;  Location: [facility] CATH INVASIVE LOCATION;  Service: Cardiology;  Laterality: N/A;    CERVICAL FUSION  2015    CHOLECYSTECTOMY      COLONOSCOPY N/A 2/16/2021    Procedure: COLONOSCOPY to cecum and TI:  cold biopsy polypectomy, not snare polypectomy, cold snare polypectomies,;  Surgeon: Elenita Kaba MD;  Location: [facility] ENDOSCOPY;  Service: Gastroenterology;  Laterality: N/A;  pre:  rectal bleeding and screening  post:  polyps,     ELBOW ARTHROPLASTY Right     Tendon and artery repair from traumatic wound     FRACTURE SURGERY      INGUINAL HERNIA REPAIR Left     X2    JOINT MANIPULATION Left 4/16/2018    Procedure: LEFT KNEE MANIPULATION;  Surgeon: Vinod Crooks MD;  Location: [facility] MAIN OR;  Service: Orthopedics    KIDNEY STONE SURGERY      MULITPLE SURGERIES    KNEE ARTHROPLASTY Left 2018    KNEE ARTHROPLASTY, PARTIAL REPLACEMENT Left 1989    KNEE ARTHROSCOPY Right 1980'S  1990'S    X 2    KNEE SURGERY Left     X8    LEG SURGERY      from spider bites    NECK SURGERY      x 3 (1198, 2003, 20125). Plated and fused    PROSTATE RADIOACTIVE SEED IMPLANT  01/2017    PROSTATE SURGERY      SHOULDER ARTHROSCOPY Right     SHOULDER ARTHROSCOPY W/ ROTATOR CUFF REPAIR Right 2/24/2021    Procedure: RIGHT SHOULDER ARTHROSCOPY WITH ROTATOR CUFF REPAIR AND ACROMIOPLASTY WITH DEBRIDEMENT  FOLLOWED BY OPEN DISTAL CLAVICLE EXCISION INTRASCALEN BLOCK;  Surgeon: Ramon Sun MD;  Location: [facility] OR OSC;  Service: Orthopedics;  Laterality: Right;    TESTICLE SURGERY Right     Hydrocele repair     TONSILLECTOMY      TOTAL KNEE ARTHROPLASTY Left 3/5/2018    Procedure: LEFT TOTAL KNEE ARTHROPLASTY;  Surgeon: Vinod Crooks MD;  Location: [facility] MAIN OR;  Service:     TOTAL KNEE ARTHROPLASTY REVISION Left  2003       Allergies:  Fexofenadine-pseudoephed er, Metformin, and Shellfish-derived products    Home medications:  Medications Prior to Admission   Medication Sig Dispense Refill Last Dose    allopurinol (ZYLOPRIM) 300 MG tablet Take 1 tablet by mouth Every Night.   12/30/2023 at 2200    amLODIPine (NORVASC) 2.5 MG tablet Take 1 tablet by mouth Daily. (Patient taking differently: Take 2 tablets by mouth Every Night.) 90 tablet 3     atorvastatin (LIPITOR) 10 MG tablet Take 4 tablets by mouth Every Night.       DULoxetine (CYMBALTA) 60 MG capsule Take 2 capsules by mouth Every Night.       lisinopril (PRINIVIL,ZESTRIL) 2.5 MG tablet TAKE 1 TABLET BY MOUTH IN  THE MORNING (Patient taking differently: Take 1 tablet by mouth Every Night.) 90 tablet 0     oxyCODONE-acetaminophen (PERCOCET)  MG per tablet Take 0.5-1 tablets by mouth Every 8 (Eight) Hours As Needed for Severe Pain . (Patient taking differently: Take 1 tablet by mouth Every 6 (Six) Hours As Needed for Severe Pain.) 90 tablet 0 12/31/2023 at 1800    pregabalin (LYRICA) 150 MG capsule Take 2 capsules by mouth Every Night.       tiZANidine (ZANAFLEX) 4 MG tablet Take 1 tablet by mouth 2 (Two) Times a Day As Needed for Muscle Spasms. 60 tablet 1         Hospital medications:  allopurinol, 300 mg, Oral, Nightly  amLODIPine, 5 mg, Oral, Nightly  atorvastatin, 40 mg, Oral, Nightly  DULoxetine, 120 mg, Oral, Nightly  insulin lispro, 2-7 Units, Subcutaneous, 4x Daily AC & at Bedtime  lisinopril, 2.5 mg, Oral, Nightly  pregabalin, 300 mg, Oral, Nightly  sodium chloride, 10 mL, Intravenous, Q12H           acetaminophen **OR** acetaminophen **OR** acetaminophen    calcium carbonate    dextrose    dextrose    glucagon (human recombinant)    HYDROmorphone    ondansetron ODT **OR** ondansetron    oxyCODONE-acetaminophen    sodium chloride    sodium chloride    tiZANidine    Family history:  Family History   Problem Relation Age of Onset    Stroke Mother      Hypertension Mother     Alzheimer's disease Father     Dementia Father     Hypertension Father     Alcohol abuse Sister     Hypertension Sister     No Known Problems Brother     Malteddy Hyperthermia Neg Hx     Colon cancer Neg Hx     Colon polyps Neg Hx        Social history:  Social History     Tobacco Use    Smoking status: Former     Packs/day: 1     Types: Pipe, Cigarettes     Quit date:      Years since quittin.0    Smokeless tobacco: Current     Types: Snuff    Tobacco comments:     quit 2020   Vaping Use    Vaping Use: Never used   Substance Use Topics    Alcohol use: Never     Comment: daily caffiene    Drug use: No       Review of systems:      Positive for:  nothing  Negative for:  chest pain, cough, sob, o/w neg    Objective:  TMax 24 hours:   Temp (24hrs), Av.1 °F (36.7 °C), Min:97 °F (36.1 °C), Max:98.8 °F (37.1 °C)      Vitals Ranges:   Temp:  [97 °F (36.1 °C)-98.8 °F (37.1 °C)] 98.8 °F (37.1 °C)  Heart Rate:  [62-84] 62  Resp:  [18-20] 18  BP: (143-163)/(81-89) 143/89    Intake/Output Last 3 shifts:  I/O last 3 completed shifts:  In: 1000 [IV Piggyback:1000]  Out: 1400 [Urine:1400]     Physical Exam:    General Appearance:    Alert, cooperative, NAD   Abdomen:     Soft, tender, nondistended.   Neuro/Psych:   Orientation intact, mood/affect pleasant       Results review:   I reviewed the patient's new clinical results.    Data review:  Lab Results (last 24 hours)       Procedure Component Value Units Date/Time    POC Glucose Once [[id number]]  (Abnormal) Collected: 24    Specimen: Blood Updated: 24     Glucose 192 mg/dL     CBC (No Diff) [[id number]]  (Abnormal) Collected: 24    Specimen: Blood Updated: 24     WBC 8.10 10*3/mm3      RBC 4.54 10*6/mm3      Hemoglobin 12.5 g/dL      Hematocrit 39.5 %      MCV 87.0 fL      MCH 27.5 pg      MCHC 31.6 g/dL      RDW 13.6 %      RDW-SD 43.2 fl      MPV 8.9 fL      Platelets 204 10*3/mm3     Basic  Metabolic Panel [[id number]]  (Abnormal) Collected: 01/01/24 0510    Specimen: Blood Updated: 01/01/24 0547     Glucose 218 mg/dL      BUN 15 mg/dL      Creatinine 0.81 mg/dL      Sodium 140 mmol/L      Potassium 4.3 mmol/L      Chloride 104 mmol/L      CO2 27.5 mmol/L      Calcium 8.7 mg/dL      BUN/Creatinine Ratio 18.5     Anion Gap 8.5 mmol/L      eGFR 102.8 mL/min/1.73     Narrative:      GFR Normal >60  Chronic Kidney Disease <60  Kidney Failure <15      Protime-INR [[id number]]  (Normal) Collected: 12/31/23 2315    Specimen: Blood from Arm, Right Updated: 01/01/24 0013     Protime 12.6 Seconds      INR 0.93    aPTT [[id number]]  (Normal) Collected: 12/31/23 2315    Specimen: Blood from Arm, Right Updated: 01/01/24 0013     PTT 27.0 seconds     Comprehensive Metabolic Panel [[id number]]  (Abnormal) Collected: 12/31/23 2315    Specimen: Blood from Arm, Right Updated: 12/31/23 2348     Glucose 357 mg/dL      BUN 15 mg/dL      Creatinine 1.04 mg/dL      Sodium 137 mmol/L      Potassium 4.4 mmol/L      Chloride 99 mmol/L      CO2 26.0 mmol/L      Calcium 9.3 mg/dL      Total Protein 6.5 g/dL      Albumin 4.2 g/dL      ALT (SGPT) 14 U/L      AST (SGOT) 16 U/L      Alkaline Phosphatase 100 U/L      Total Bilirubin 0.9 mg/dL      Globulin 2.3 gm/dL      A/G Ratio 1.8 g/dL      BUN/Creatinine Ratio 14.4     Anion Gap 12.0 mmol/L      eGFR 83.7 mL/min/1.73     Narrative:      GFR Normal >60  Chronic Kidney Disease <60  Kidney Failure <15      Urinalysis, Microscopic Only - Urine, Clean Catch [[id number]]  (Abnormal) Collected: 12/31/23 2252    Specimen: Urine, Clean Catch Updated: 12/31/23 2345     RBC, UA Too Numerous to Count /HPF      WBC, UA       Unable to determine due to loaded field     /HPF     Bacteria, UA       Unable to determine due to loaded field     /HPF     Squamous Epithelial Cells, UA       Unable to determine due to loaded field     /HPF     Hyaline Casts, UA       Unable to determine due to loaded field      /LPF     Methodology Manual Light Microscopy    Urinalysis With Microscopic If Indicated (No Culture) - Urine, Clean Catch [[id number]]  (Abnormal) Collected: 12/31/23 2252    Specimen: Urine, Clean Catch Updated: 12/31/23 2339     Color, UA Yellow     Appearance, UA Cloudy     pH, UA 6.5     Specific Gravity, UA >=1.030     Glucose, UA >=1000 mg/dL (3+)     Ketones, UA Negative     Bilirubin, UA Negative     Blood, UA Large (3+)     Protein, UA >=300 mg/dL (3+)     Leuk Esterase, UA Negative     Nitrite, UA Negative     Urobilinogen, UA 0.2 E.U./dL    CBC & Differential [[id number]]  (Normal) Collected: 12/31/23 2315    Specimen: Blood from Arm, Right Updated: 12/31/23 2330    Narrative:      The following orders were created for panel order CBC & Differential.  Procedure                               Abnormality         Status                     ---------                               -----------         ------                     CBC Auto Differential[[id number]]        Normal              Final result                 Please view results for these tests on the individual orders.    CBC Auto Differential [[id number]]  (Normal) Collected: 12/31/23 2315    Specimen: Blood from Arm, Right Updated: 12/31/23 2330     WBC 8.38 10*3/mm3      RBC 4.81 10*6/mm3      Hemoglobin 14.2 g/dL      Hematocrit 43.3 %      MCV 90.0 fL      MCH 29.5 pg      MCHC 32.8 g/dL      RDW 13.7 %      RDW-SD 45.6 fl      MPV 9.6 fL      Platelets 207 10*3/mm3      Neutrophil % 60.4 %      Lymphocyte % 30.4 %      Monocyte % 7.3 %      Eosinophil % 1.3 %      Basophil % 0.4 %      Immature Grans % 0.2 %      Neutrophils, Absolute 5.06 10*3/mm3      Lymphocytes, Absolute 2.55 10*3/mm3      Monocytes, Absolute 0.61 10*3/mm3      Eosinophils, Absolute 0.11 10*3/mm3      Basophils, Absolute 0.03 10*3/mm3      Immature Grans, Absolute 0.02 10*3/mm3      nRBC 0.1 /100 WBC              Imaging:  Imaging Results (Last 24 Hours)       Procedure  Component Value Units Date/Time    CT Abdomen Pelvis Without Contrast [[id number]] Collected: 01/01/24 0040     Updated: 01/01/24 0040    Narrative:        Patient: HECTOR VILCHIS  Time Out: 00:40  Exam(s): CT ABDOMEN + PELVIS Without Contrast     EXAM:    CT Abdomen and Pelvis Without Intravenous Contrast    CLINICAL HISTORY:     Reason for exam: lower abd pain hematuria.    TECHNIQUE:    Axial computed tomography images of the abdomen and pelvis without   intravenous contrast.  CTDI is 15 mGy and DLP is 858 mGy-cm.  This CT   exam was performed according to the principle of ALARA (As Low As   Reasonably Achievable) by using one or more of the following dose   reduction techniques: automated exposure control, adjustment of the mA   and or kV according to patient size, and or use of iterative   reconstruction technique.    COMPARISON:    8 24 2023, 7 26 2021    FINDINGS:    Lung bases:  Atelectasis at the lung bases.     ABDOMEN:    Liver:  Unremarkable.    Gallbladder and bile ducts:  Cholecystectomy.    Pancreas:  Unremarkable.    Spleen:  Unremarkable.    Adrenals:  Unremarkable.    Kidneys and ureters:  Potentially retained wire within the left ureter   is unchanged.  No hydronephrosis in the kidneys.  No ureteral stone.    Stomach and bowel:  Unremarkable.     PELVIS:    Appendix:  No findings to suggest acute appendicitis.    Bladder:  Hematoma within the bladder.    Reproductive:  Median lobe hypertrophy within the prostate.     ABDOMEN and PELVIS:    Intraperitoneal space:  Unremarkable.  No free air.  No significant   fluid collection.    Bones joints:  No acute fracture.    Soft tissues:  Unremarkable.    Vasculature:  Unremarkable.    Lymph nodes:  Unremarkable.    IMPRESSION:       1.  Hematoma within the bladder.  2.  Potentially retained wire within the left ureter is unchanged.  3.  Median lobe hypertrophy within the prostate.      Impression:          Electronically signed by Ashutosh Marks MD on  01-01-24 at 0040               Assessment:     Gross hematuria with clot retention  Hematoma in bladder on CT   BPH s/p recent HoLEP procedure    Plan:     NPO, may need clot evac  Start CBI tonight  Hand irrigate PRN    Lynette Gerardo, APRN  01/01/24  09:37 EST    ATTENDING NOTE:  I saw and examined the patient and was present for the key portions of the visit.  I agree with, and participated in the plan as detailed above.  I replaced montejo with a 24 Fr Felicity 3 way montejo - a small amount of clot was flushed through the catheter and then it irrigated completely clear.  Will start CBI, wean to off, and give void trial in AM if remains clear.  Dr. Manriquez may play for cysto as OP but GH even this far after HoLEP is possible.  Will follow.

## 2024-01-01 NOTE — ED PROVIDER NOTES
EMERGENCY DEPARTMENT ENCOUNTER    Room Number:  13/13  PCP: Provider, No Known  Historian: Patient      HPI:  Chief Complaint: Blood in urine  A complete HPI/ROS/PMH/PSH/SH/FH are unobtainable due to: None  Context: Flaco Miles Sr. is a 58 y.o. male who presents to the ED c/o gradual onset but progressively worsening bleeding with urination that is now been present for the past 3 days.  He does report a previous history of hematuria usually related to ureteral/kidney stones.  He also reports that he did have surgery on his bladder and prostate last month.  He denies taking any current blood thinning medication.  He reports lower abdominal discomfort associated but denies nausea/vomiting, back pain, chest pain, or shortness of breath.            PAST MEDICAL HISTORY  Active Ambulatory Problems     Diagnosis Date Noted    Chronic pain 02/18/2016    Diabetic peripheral neuropathy 02/18/2016    Essential (primary) hypertension 02/18/2016    HLD (hyperlipidemia) 02/18/2016    Gout 02/18/2016    Type 2 diabetes mellitus with diabetic polyneuropathy, without long-term current use of insulin 02/18/2016    Vitamin B12 deficiency 02/23/2017    Chronic fatigue 07/19/2017    Allergic conjunctivitis 07/19/2017    Chronic pain of both knees 01/29/2018    DJD (degenerative joint disease) of knee 03/05/2018    Postlaminectomy syndrome 08/13/2018    Encounter for long-term (current) use of high-risk medication 08/13/2018    BPH with elevated PSA 08/17/2018    CECILIA (obstructive sleep apnea) 08/17/2018    Hydrocele in adult 10/05/2018    Elevated ALT measurement 10/16/2018    Primary insomnia 04/16/2019    Vertigo 07/30/2020    Bowel habit changes 07/30/2020    Elevated bilirubin 07/31/2020    Drug-induced constipation 01/14/2021    Tubulovillous adenoma polyp of colon 02/17/2021    Urinary incontinence due to urethral sphincter incompetence 08/26/2021    Congenital heart defect 11/16/2021     Resolved Ambulatory Problems      Diagnosis Date Noted    Diabetes mellitus type 2, uncontrolled 02/18/2016    Pain of both wrist joints 06/27/2016    Swollen scrotum 06/27/2016    Pain of right hand 06/27/2016    Chronic nonintractable headache 02/23/2017    Prostate cancer screening 02/23/2017    Chronic intractable headache 03/02/2017    Headache 03/02/2017    Thumb pain 08/28/2017    Abnormal liver enzymes 01/29/2018    Orthostatic hypotension 07/30/2020    Syncope 07/30/2020    Rectal bleeding 01/14/2021    Colon cancer screening 01/14/2021    Precordial pain 10/25/2021     Past Medical History:   Diagnosis Date    Anesthesia complication     Arthrofibrosis of knee joint, left     Constipation     DDD (degenerative disc disease), cervical     DJD (degenerative joint disease)     History of colon polyps     History of kidney stones     Hyperlipidemia     Hypertension     Left knee pain     Limited range of motion (ROM) of shoulder     Migraines     Prostate cancer 01/2017    Rotator cuff tear, right     Sleep apnea     Type 2 diabetes mellitus          PAST SURGICAL HISTORY  Past Surgical History:   Procedure Laterality Date    ABDOMINAL HERNIA REPAIR      X4    ADENOIDECTOMY      ANKLE ARTHROPLASTY Left     Piece of glass removed and cut a tendon     BACK SURGERY      X4    CARDIAC CATHETERIZATION N/A 10/25/2021    Procedure: Left Heart Cath;  Surgeon: Layne Klein MD;  Location:  [facility];  Service: Cardiology;  Laterality: N/A;    CARDIAC CATHETERIZATION N/A 10/25/2021    Procedure: Coronary angiography;  Surgeon: Layne Klein MD;  Location:  [facility];  Service: Cardiology;  Laterality: N/A;    CARDIAC CATHETERIZATION N/A 10/25/2021    Procedure: Left ventriculography;  Surgeon: Layne Klein MD;  Location:  [facility];  Service: Cardiology;  Laterality: N/A;    CERVICAL FUSION  2015    CHOLECYSTECTOMY      COLONOSCOPY N/A 2/16/2021    Procedure: COLONOSCOPY to cecum and TI:   cold biopsy polypectomy, not snare polypectomy, cold snare polypectomies,;  Surgeon: Elenita Kaba MD;  Location: [facility] ENDOSCOPY;  Service: Gastroenterology;  Laterality: N/A;  pre:  rectal bleeding and screening  post:  polyps,     ELBOW ARTHROPLASTY Right     Tendon and artery repair from traumatic wound     FRACTURE SURGERY      INGUINAL HERNIA REPAIR Left     X2    JOINT MANIPULATION Left 4/16/2018    Procedure: LEFT KNEE MANIPULATION;  Surgeon: Vinod Crooks MD;  Location: [facility] MAIN OR;  Service: Orthopedics    KIDNEY STONE SURGERY      MULITPLE SURGERIES    KNEE ARTHROPLASTY Left 2018    KNEE ARTHROPLASTY, PARTIAL REPLACEMENT Left 1989    KNEE ARTHROSCOPY Right 1980'S 1990'S    X 2    KNEE SURGERY Left     X8    LEG SURGERY      from spider bites    NECK SURGERY      x 3 (1198, 2003, 20125). Plated and fused    PROSTATE RADIOACTIVE SEED IMPLANT  01/2017    PROSTATE SURGERY      SHOULDER ARTHROSCOPY Right     SHOULDER ARTHROSCOPY W/ ROTATOR CUFF REPAIR Right 2/24/2021    Procedure: RIGHT SHOULDER ARTHROSCOPY WITH ROTATOR CUFF REPAIR AND ACROMIOPLASTY WITH DEBRIDEMENT  FOLLOWED BY OPEN DISTAL CLAVICLE EXCISION INTRASCALEN BLOCK;  Surgeon: Ramon Sun MD;  Location: [facility] OR [facility]SC;  Service: Orthopedics;  Laterality: Right;    TESTICLE SURGERY Right     Hydrocele repair     TONSILLECTOMY      TOTAL KNEE ARTHROPLASTY Left 3/5/2018    Procedure: LEFT TOTAL KNEE ARTHROPLASTY;  Surgeon: Vinod Crooks MD;  Location: [facility] OR;  Service:     TOTAL KNEE ARTHROPLASTY REVISION Left 2003         FAMILY HISTORY  Family History   Problem Relation Age of Onset    Stroke Mother     Hypertension Mother     Alzheimer's disease Father     Dementia Father     Hypertension Father     Alcohol abuse Sister     Hypertension Sister     No Known Problems Brother     Malig Hyperthermia Neg Hx     Colon cancer Neg Hx     Colon polyps Neg Hx          SOCIAL HISTORY  Social History     Socioeconomic History     Marital status:    Tobacco Use    Smoking status: Former     Packs/day: 1     Types: Pipe, Cigarettes     Quit date:      Years since quittin.0    Smokeless tobacco: Current     Types: Snuff    Tobacco comments:     quit 2020   Vaping Use    Vaping Use: Never used   Substance and Sexual Activity    Alcohol use: Never     Comment: daily caffiene    Drug use: No    Sexual activity: Defer         ALLERGIES  Fexofenadine-pseudoephed er, Metformin, and Shellfish-derived products        REVIEW OF SYSTEMS  Review of Systems   Constitutional:  Negative for activity change, appetite change and fever.   HENT:  Negative for congestion and sore throat.    Eyes: Negative.    Respiratory:  Negative for cough and shortness of breath.    Cardiovascular:  Negative for chest pain and leg swelling.   Gastrointestinal:  Positive for abdominal pain. Negative for diarrhea and vomiting.   Endocrine: Negative.    Genitourinary:  Positive for dysuria and hematuria. Negative for decreased urine volume.   Musculoskeletal:  Negative for neck pain.   Skin:  Negative for rash and wound.   Allergic/Immunologic: Negative.    Neurological:  Negative for weakness, numbness and headaches.   Hematological: Negative.    Psychiatric/Behavioral: Negative.     All other systems reviewed and are negative.         PHYSICAL EXAM  ED Triage Vitals   Temp Heart Rate Resp BP SpO2   230 23   97 °F (36.1 °C) 84 20 163/83 98 %      Temp src Heart Rate Source Patient Position BP Location FiO2 (%)   23 -- -- --   Tympanic Monitor          Physical Exam  Constitutional:       General: He is not in acute distress.     Appearance: Normal appearance. He is not ill-appearing or toxic-appearing.   HENT:      Head: Normocephalic and atraumatic.   Eyes:      Extraocular Movements: Extraocular movements intact.      Pupils: Pupils are equal, round, and reactive to  light.   Cardiovascular:      Rate and Rhythm: Normal rate and regular rhythm.      Heart sounds: No murmur heard.     No friction rub. No gallop.   Pulmonary:      Effort: Pulmonary effort is normal.      Breath sounds: Normal breath sounds.   Abdominal:      General: Abdomen is flat. There is no distension.      Palpations: Abdomen is soft.      Tenderness: There is no abdominal tenderness in the right lower quadrant, suprapubic area and left lower quadrant.   Musculoskeletal:         General: No swelling or tenderness. Normal range of motion.      Cervical back: Normal range of motion and neck supple.   Skin:     General: Skin is warm and dry.   Neurological:      General: No focal deficit present.      Mental Status: He is alert and oriented to person, place, and time.      Sensory: No sensory deficit.      Motor: No weakness.   Psychiatric:         Mood and Affect: Mood normal.         Behavior: Behavior normal.           Vital signs and nursing notes reviewed.          LAB RESULTS  Recent Results (from the past 24 hour(s))   Urinalysis With Microscopic If Indicated (No Culture) - Urine, Clean Catch    Collection Time: 12/31/23 10:52 PM    Specimen: Urine, Clean Catch   Result Value Ref Range    Color, UA Yellow Yellow, Straw    Appearance, UA Cloudy (A) Clear    pH, UA 6.5 5.0 - 8.0    Specific Gravity, UA >=1.030 1.005 - 1.030    Glucose, UA >=1000 mg/dL (3+) (A) Negative    Ketones, UA Negative Negative    Bilirubin, UA Negative Negative    Blood, UA Large (3+) (A) Negative    Protein, UA >=300 mg/dL (3+) (A) Negative    Leuk Esterase, UA Negative Negative    Nitrite, UA Negative Negative    Urobilinogen, UA 0.2 E.U./dL 0.2 - 1.0 E.U./dL   Urinalysis, Microscopic Only - Urine, Clean Catch    Collection Time: 12/31/23 10:52 PM    Specimen: Urine, Clean Catch   Result Value Ref Range    RBC, UA Too Numerous to Count (A) None Seen, 0-2 /HPF    WBC, UA Unable to determine due to loaded field (A) None Seen, 0-2  /HPF    Bacteria, UA Unable to determine due to loaded field (A) None Seen /HPF    Squamous Epithelial Cells, UA Unable to determine due to loaded field (A) None Seen, 0-2 /HPF    Hyaline Casts, UA Unable to determine due to loaded field None Seen /LPF    Methodology Manual Light Microscopy    Comprehensive Metabolic Panel    Collection Time: 12/31/23 11:15 PM    Specimen: Arm, Right; Blood   Result Value Ref Range    Glucose 357 (H) 65 - 99 mg/dL    BUN 15 6 - 20 mg/dL    Creatinine 1.04 0.76 - 1.27 mg/dL    Sodium 137 136 - 145 mmol/L    Potassium 4.4 3.5 - 5.2 mmol/L    Chloride 99 98 - 107 mmol/L    CO2 26.0 22.0 - 29.0 mmol/L    Calcium 9.3 8.6 - 10.5 mg/dL    Total Protein 6.5 6.0 - 8.5 g/dL    Albumin 4.2 3.5 - 5.2 g/dL    ALT (SGPT) 14 1 - 41 U/L    AST (SGOT) 16 1 - 40 U/L    Alkaline Phosphatase 100 39 - 117 U/L    Total Bilirubin 0.9 0.0 - 1.2 mg/dL    Globulin 2.3 gm/dL    A/G Ratio 1.8 g/dL    BUN/Creatinine Ratio 14.4 7.0 - 25.0    Anion Gap 12.0 5.0 - 15.0 mmol/L    eGFR 83.7 >60.0 mL/min/1.73   Protime-INR    Collection Time: 12/31/23 11:15 PM    Specimen: Arm, Right; Blood   Result Value Ref Range    Protime 12.6 11.7 - 14.2 Seconds    INR 0.93 0.90 - 1.10   aPTT    Collection Time: 12/31/23 11:15 PM    Specimen: Arm, Right; Blood   Result Value Ref Range    PTT 27.0 22.7 - 35.4 seconds   CBC Auto Differential    Collection Time: 12/31/23 11:15 PM    Specimen: Arm, Right; Blood   Result Value Ref Range    WBC 8.38 3.40 - 10.80 10*3/mm3    RBC 4.81 4.14 - 5.80 10*6/mm3    Hemoglobin 14.2 13.0 - 17.7 g/dL    Hematocrit 43.3 37.5 - 51.0 %    MCV 90.0 79.0 - 97.0 fL    MCH 29.5 26.6 - 33.0 pg    MCHC 32.8 31.5 - 35.7 g/dL    RDW 13.7 12.3 - 15.4 %    RDW-SD 45.6 37.0 - 54.0 fl    MPV 9.6 6.0 - 12.0 fL    Platelets 207 140 - 450 10*3/mm3    Neutrophil % 60.4 42.7 - 76.0 %    Lymphocyte % 30.4 19.6 - 45.3 %    Monocyte % 7.3 5.0 - 12.0 %    Eosinophil % 1.3 0.3 - 6.2 %    Basophil % 0.4 0.0 - 1.5 %     Immature Grans % 0.2 0.0 - 0.5 %    Neutrophils, Absolute 5.06 1.70 - 7.00 10*3/mm3    Lymphocytes, Absolute 2.55 0.70 - 3.10 10*3/mm3    Monocytes, Absolute 0.61 0.10 - 0.90 10*3/mm3    Eosinophils, Absolute 0.11 0.00 - 0.40 10*3/mm3    Basophils, Absolute 0.03 0.00 - 0.20 10*3/mm3    Immature Grans, Absolute 0.02 0.00 - 0.05 10*3/mm3    nRBC 0.1 0.0 - 0.2 /100 WBC       Ordered the above labs and reviewed the results.        RADIOLOGY  CT Abdomen Pelvis Without Contrast    Result Date: 1/1/2024  Patient: HECTOR VILCHIS  Time Out: 00:40 Exam(s): CT ABDOMEN + PELVIS Without Contrast EXAM:   CT Abdomen and Pelvis Without Intravenous Contrast CLINICAL HISTORY:    Reason for exam: lower abd pain hematuria. TECHNIQUE:   Axial computed tomography images of the abdomen and pelvis without intravenous contrast.  CTDI is 15 mGy and DLP is 858 mGy-cm.  This CT exam was performed according to the principle of ALARA (As Low As Reasonably Achievable) by using one or more of the following dose reduction techniques: automated exposure control, adjustment of the mA and or kV according to patient size, and or use of iterative reconstruction technique. COMPARISON:   8 24 2023, 7 26 2021 FINDINGS:   Lung bases:  Atelectasis at the lung bases.  ABDOMEN:   Liver:  Unremarkable.   Gallbladder and bile ducts:  Cholecystectomy.   Pancreas:  Unremarkable.   Spleen:  Unremarkable.   Adrenals:  Unremarkable.   Kidneys and ureters:  Potentially retained wire within the left ureter is unchanged.  No hydronephrosis in the kidneys.  No ureteral stone.   Stomach and bowel:  Unremarkable.  PELVIS:   Appendix:  No findings to suggest acute appendicitis.   Bladder:  Hematoma within the bladder.   Reproductive:  Median lobe hypertrophy within the prostate.  ABDOMEN and PELVIS:   Intraperitoneal space:  Unremarkable.  No free air.  No significant fluid collection.   Bones joints:  No acute fracture.   Soft tissues:  Unremarkable.   Vasculature:   Unremarkable.   Lymph nodes:  Unremarkable. IMPRESSION:     1.  Hematoma within the bladder. 2.  Potentially retained wire within the left ureter is unchanged. 3.  Median lobe hypertrophy within the prostate.     Electronically signed by Ashutosh Marks MD on 01-01-24 at 0040     Ordered the above noted radiological studies. Reviewed by me in PACS.            PROCEDURES  Procedures          MEDICATIONS GIVEN IN ER  Medications   ondansetron (ZOFRAN) injection 4 mg (4 mg Intravenous Given 12/31/23 2331)   HYDROmorphone (DILAUDID) injection 0.5 mg (0.5 mg Intravenous Given 12/31/23 2331)   sodium chloride 0.9 % bolus 1,000 mL (1,000 mL Intravenous New Bag 12/31/23 2331)   HYDROmorphone (DILAUDID) injection 0.5 mg (0.5 mg Intravenous Given 12/31/23 2347)   HYDROmorphone (DILAUDID) injection 1 mg (1 mg Intravenous Given 1/1/24 0121)                   MEDICAL DECISION MAKING, PROGRESS, and CONSULTS    All labs have been independently reviewed by me.  All radiology studies have been reviewed by me and I have also reviewed the radiology report.   EKG's independently viewed and interpreted by me.  Discussion below represents my analysis of pertinent findings related to patient's condition, differential diagnosis, treatment plan and final disposition.      Additional sources:  - Discussed/ obtained information from independent historians: History obtained from the patient as well as patient's family at bedside.    - External (non-ED) record review: Upon medical records review, the patient was last seen and evaluated in the outpatient office of primary care on 10/20/2023 for a preoperative examination as well as follow-up for his hypertension and diabetes.    - Chronic or social conditions impacting care: Type 2 diabetes mellitus, history of BPH    - Shared decision making: Admission decision based on shared conversations have between myself, the patient and family at bedside, as well as KAYLEEN Alves for  [facility].      Orders placed during this visit:  Orders Placed This Encounter   Procedures    CT Abdomen Pelvis Without Contrast    Comprehensive Metabolic Panel    Protime-INR    aPTT    Urinalysis With Microscopic If Indicated (No Culture) - Urine, Clean Catch    CBC Auto Differential    Urinalysis, Microscopic Only - Urine, Clean Catch    Insert 3-way Avalos Catheter    Assess Need for Indwelling Urinary Catheter - Follow Removal Protocol    Urinary Catheter Care    [facility] (on-call MD unless specified) Details    CBC & Differential           Differential diagnosis includes but is not limited to:    Differential diagnosis includes but is not limited to gross hematuria, hemorrhagic cystitis, acute pyelonephritis, ureterolithiasis, bladder mass, or acute blood loss anemia.      Independent interpretation of labs, radiology studies, and discussions with consultants:    CT scan of the abdomen and pelvis was independently interpreted by myself with my interpretation showing a potential hematoma within the bladder without overt mass.  No ureterolithiasis or hydronephrosis seen.        ED Course as of 01/01/24 0124   Mon Jan 01, 2024   0057 On reevaluation, the patient has had approximately 2 to 3 L infused saline into the bladder with continued bleeding.  I did inform him that his hemoglobin is stable but it does appear on CT that he has a large bladder hematoma.  We will admit him to the hospital [facility] for continuous irrigation as well as urologic assessment.  All questions have been answered to the patient's in agreement with that plan. [BM]   0122 The patient's presentation, workup, as well as diagnosis and treatment plan was discussed at length with KAYLEEN Alves for [facility].  She agrees to admit the patient to the hospital today for Dr. Guo. [BM]      ED Course User Index  [BM] Erasmo Bermudez MD             DIAGNOSIS  Final diagnoses:   Gross hematuria   Lower abdominal pain          DISPOSITION  ADMISSION    Discussed treatment plan and reason for admission with pt/family and admitting physician.  Pt/family voiced understanding of the plan for admission for further testing/treatment as needed.               Latest Documented Vital Signs:  As of 01:24 EST  BP- 154/81 HR- 62 Temp- 97 °F (36.1 °C) (Tympanic) O2 sat- 97%              --    Please note that portions of this were completed with a voice recognition program.       Note Disclaimer: At [facility], we believe that sharing information builds trust and better relationships. You are receiving this note because you are receiving care at [facility] or recently visited. It is possible you will see health information before a provider has talked with you about it. This kind of information can be easy to misunderstand. To help you fully understand what it means for your health, we urge you to discuss this note with your provider.             Erasmo Bermudez MD  01/01/24 0124

## 2024-01-01 NOTE — PLAN OF CARE
Problem: Adult Inpatient Plan of Care  Goal: Plan of Care Review  Outcome: Ongoing, Progressing   Goal Outcome Evaluation:   Vss.CBI restarted this afternoon, tolerating well.Urine pink, clear.Prn pain meds given as ordered for abdominal pain and generalized pain.Prn lidocaine jelly given as ordered for pain at catheter insertion site.No c/o n/v.

## 2024-01-01 NOTE — PLAN OF CARE
Goal Outcome Evaluation:  Plan of Care Reviewed With: patient           Outcome Evaluation: C/o abdominal pain. Urine output light pink with some clot at times. Called Urology for consult.NPO . Sinus bradycardia on monitor. No other complaints.

## 2024-01-01 NOTE — H&P
Patient Name:  Flaco Miles .  YOB: 1965  MRN:  [id number]  Admit Date:  12/31/2023  Patient Care Team:  Provider, No Known as PCP - General  Karan Valdivia MD as Consulting Physician (Orthopedic Surgery)  Douglas Barron MD as Consulting Physician (Urology)  Tim Roman MD (Pain Medicine)  Vinod Crooks MD as Consulting Physician (Orthopedic Surgery)      Subjective   History Present Illness     Chief Complaint   Patient presents with    Blood in Urine       Mr. Miles is a 58 y.o. former smoker with a history of BPH, hypertension, type 2 diabetes mellitus, hyperlipidemia, and obstructive sleep apnea that presents to [facility] complaining of bloody urine.  He reports he has had both gross hematuria and blood clots in his urine for the past 3 days.  He reports associated bladder pain that is described as constant and aching in nature.  He states the pain is worse when he eats and he reports constipation.  He denies fever, chills, chest pain, shortness of breath, dysuria, frequency, and flank pain.  He reports he had a prostatectomy and bladder stone removal surgery in November of 2023.  A CT of the abdomen/pelvis performed in the ED revealed a hematoma within the bladder.  He had a 3-way indwelling montejo catheter placed in the ED for continuous bladder irrigation.  He is being admitted for further evaluation.      History of Present Illness  Review of Systems   Constitutional:  Negative for chills and fever.   HENT:  Negative for congestion and sore throat.    Eyes:  Negative for photophobia and visual disturbance.   Respiratory:  Negative for cough, shortness of breath and wheezing.    Cardiovascular:  Negative for chest pain, palpitations and leg swelling.   Gastrointestinal:  Positive for abdominal pain, constipation and nausea. Negative for diarrhea and vomiting.   Endocrine: Negative for polydipsia, polyphagia and polyuria.   Genitourinary:  Positive for  hematuria and penile pain. Negative for decreased urine volume, dysuria, flank pain, frequency and urgency.   Musculoskeletal:  Positive for arthralgias and myalgias.   Neurological:  Negative for dizziness, weakness, light-headedness, numbness and headaches.        Personal History     Past Medical History:   Diagnosis Date    Anesthesia complication     WAKES VIOLENTLY.  UNKNOWN REASON    Arthrofibrosis of knee joint, left     Constipation     DDD (degenerative disc disease), cervical     DJD (degenerative joint disease)     Gout     History of colon polyps     History of kidney stones     Hyperlipidemia     Hypertension     Left knee pain     Limited range of motion (ROM) of shoulder     BOTH SHOULDERS    Migraines     Prostate cancer 01/2017    SEED IMPLANTS    Rotator cuff tear, right     Sleep apnea     NO MACHINE    Type 2 diabetes mellitus      Past Surgical History:   Procedure Laterality Date    ABDOMINAL HERNIA REPAIR      X4    ADENOIDECTOMY      ANKLE ARTHROPLASTY Left     Piece of glass removed and cut a tendon     BACK SURGERY      X4    CARDIAC CATHETERIZATION N/A 10/25/2021    Procedure: Left Heart Cath;  Surgeon: Layne Klein MD;  Location: [facility] CATH INVASIVE LOCATION;  Service: Cardiology;  Laterality: N/A;    CARDIAC CATHETERIZATION N/A 10/25/2021    Procedure: Coronary angiography;  Surgeon: Layne Klein MD;  Location:  [facility] CATH INVASIVE LOCATION;  Service: Cardiology;  Laterality: N/A;    CARDIAC CATHETERIZATION N/A 10/25/2021    Procedure: Left ventriculography;  Surgeon: Layne Klein MD;  [facility];  Service: Cardiology;  Laterality: N/A;    CERVICAL FUSION  2015    CHOLECYSTECTOMY      COLONOSCOPY N/A 2/16/2021    Procedure: COLONOSCOPY to cecum and TI:  cold biopsy polypectomy, not snare polypectomy, cold snare polypectomies,;  Surgeon: Elenita Kaba MD;  Location: [facility] ENDOSCOPY;  Service: Gastroenterology;  Laterality: N/A;  pre:  rectal  bleeding and screening  post:  polyps,     ELBOW ARTHROPLASTY Right     Tendon and artery repair from traumatic wound     FRACTURE SURGERY      INGUINAL HERNIA REPAIR Left     X2    JOINT MANIPULATION Left 2018    Procedure: LEFT KNEE MANIPULATION;  Surgeon: Vinod Crooks MD;  Location: [facility];  Service: Orthopedics    KIDNEY STONE SURGERY      MULITPLE SURGERIES    KNEE ARTHROPLASTY Left     KNEE ARTHROPLASTY, PARTIAL REPLACEMENT Left     KNEE ARTHROSCOPY Right S  'S    X 2    KNEE SURGERY Left     X8    LEG SURGERY      from spider bites    NECK SURGERY      x 3 (1198, , ). Plated and fused    PROSTATE RADIOACTIVE SEED IMPLANT  2017    PROSTATE SURGERY      SHOULDER ARTHROSCOPY Right     SHOULDER ARTHROSCOPY W/ ROTATOR CUFF REPAIR Right 2021    Procedure: RIGHT SHOULDER ARTHROSCOPY WITH ROTATOR CUFF REPAIR AND ACROMIOPLASTY WITH DEBRIDEMENT  FOLLOWED BY OPEN DISTAL CLAVICLE EXCISION INTRASCALEN BLOCK;  Surgeon: Ramon Sun MD;  Location: [facility];  Service: Orthopedics;  Laterality: Right;    TESTICLE SURGERY Right     Hydrocele repair     TONSILLECTOMY      TOTAL KNEE ARTHROPLASTY Left 3/5/2018    Procedure: LEFT TOTAL KNEE ARTHROPLASTY;  Surgeon: Vinod Crooks MD;  Location: [facility];  Service:     TOTAL KNEE ARTHROPLASTY REVISION Left      Family History   Problem Relation Age of Onset    Stroke Mother     Hypertension Mother     Alzheimer's disease Father     Dementia Father     Hypertension Father     Alcohol abuse Sister     Hypertension Sister     No Known Problems Brother     Malig Hyperthermia Neg Hx     Colon cancer Neg Hx     Colon polyps Neg Hx      Social History     Tobacco Use    Smoking status: Former     Packs/day: 1     Types: Pipe, Cigarettes     Quit date:      Years since quittin.0    Smokeless tobacco: Current     Types: Snuff    Tobacco comments:     quit 2020   Vaping Use    Vaping Use: Never used    Substance Use Topics    Alcohol use: Never     Comment: daily caffiene    Drug use: No     (Not in a hospital admission)    Allergies:    Allergies   Allergen Reactions    Fexofenadine-Pseudoephed Er Other (See Comments)     TACHYCARDIA    Metformin Diarrhea    Shellfish-Derived Products Palpitations       Objective    Objective     Vital Signs  Temp:  [97 °F (36.1 °C)] 97 °F (36.1 °C)  Heart Rate:  [62-84] 62  Resp:  [20] 20  BP: (154-163)/(81-83) 154/81  SpO2:  [97 %-98 %] 97 %  on  Flow (L/min):  [2] 2;   Device (Oxygen Therapy): nasal cannula  Body mass index is 32.08 kg/m².    Physical Exam  Vitals and nursing note reviewed.   Constitutional:       Appearance: Normal appearance.   HENT:      Head: Normocephalic and atraumatic.      Nose: Nose normal.      Mouth/Throat:      Mouth: Mucous membranes are moist.      Pharynx: Oropharynx is clear.   Eyes:      Extraocular Movements: Extraocular movements intact.      Conjunctiva/sclera: Conjunctivae normal.   Cardiovascular:      Rate and Rhythm: Normal rate and regular rhythm.      Pulses: Normal pulses.      Heart sounds: Normal heart sounds.   Pulmonary:      Effort: Pulmonary effort is normal.      Breath sounds: Normal breath sounds.   Abdominal:      General: Bowel sounds are normal. There is no distension.      Palpations: Abdomen is soft. There is no mass.      Tenderness: There is abdominal tenderness. There is no guarding or rebound.      Hernia: No hernia is present.   Genitourinary:     Comments: F/C in place with small blood clots in the line and pink-tinged urine in the collection bag  Musculoskeletal:         General: No swelling. Normal range of motion.      Cervical back: Normal range of motion and neck supple.   Skin:     General: Skin is warm and dry.   Neurological:      General: No focal deficit present.      Mental Status: He is alert and oriented to person, place, and time.   Psychiatric:         Mood and Affect: Mood normal.          Behavior: Behavior normal.         Results Review:  I reviewed the patient's new clinical results.  I reviewed the patient's new imaging results and agree with the interpretation.  I reviewed the patient's other test results and agree with the interpretation  I personally viewed and interpreted the patient's EKG/Telemetry data  Discussed with ED provider.    Lab Results (last 24 hours)       Procedure Component Value Units Date/Time    Urinalysis With Microscopic If Indicated (No Culture) - Urine, Clean Catch [[id number]]  (Abnormal) Collected: 12/31/23 2252    Specimen: Urine, Clean Catch Updated: 12/31/23 2339     Color, UA Yellow     Appearance, UA Cloudy     pH, UA 6.5     Specific Gravity, UA >=1.030     Glucose, UA >=1000 mg/dL (3+)     Ketones, UA Negative     Bilirubin, UA Negative     Blood, UA Large (3+)     Protein, UA >=300 mg/dL (3+)     Leuk Esterase, UA Negative     Nitrite, UA Negative     Urobilinogen, UA 0.2 E.U./dL    Urinalysis, Microscopic Only - Urine, Clean Catch [[id number]]  (Abnormal) Collected: 12/31/23 2252    Specimen: Urine, Clean Catch Updated: 12/31/23 2345     RBC, UA Too Numerous to Count /HPF      WBC, UA       Unable to determine due to loaded field     /HPF     Bacteria, UA       Unable to determine due to loaded field     /HPF     Squamous Epithelial Cells, UA       Unable to determine due to loaded field     /HPF     Hyaline Casts, UA       Unable to determine due to loaded field     /LPF     Methodology Manual Light Microscopy    CBC & Differential [[id number]]  (Normal) Collected: 12/31/23 2315    Specimen: Blood from Arm, Right Updated: 12/31/23 2330    Narrative:      The following orders were created for panel order CBC & Differential.  Procedure                               Abnormality         Status                     ---------                               -----------         ------                     CBC Auto Differential[[id number]]        Normal              Final  result                 Please view results for these tests on the individual orders.    Comprehensive Metabolic Panel [[id number]]  (Abnormal) Collected: 12/31/23 2315    Specimen: Blood from Arm, Right Updated: 12/31/23 2348     Glucose 357 mg/dL      BUN 15 mg/dL      Creatinine 1.04 mg/dL      Sodium 137 mmol/L      Potassium 4.4 mmol/L      Chloride 99 mmol/L      CO2 26.0 mmol/L      Calcium 9.3 mg/dL      Total Protein 6.5 g/dL      Albumin 4.2 g/dL      ALT (SGPT) 14 U/L      AST (SGOT) 16 U/L      Alkaline Phosphatase 100 U/L      Total Bilirubin 0.9 mg/dL      Globulin 2.3 gm/dL      A/G Ratio 1.8 g/dL      BUN/Creatinine Ratio 14.4     Anion Gap 12.0 mmol/L      eGFR 83.7 mL/min/1.73     Narrative:      GFR Normal >60  Chronic Kidney Disease <60  Kidney Failure <15      Protime-INR [[id number]]  (Normal) Collected: 12/31/23 2315    Specimen: Blood from Arm, Right Updated: 01/01/24 0013     Protime 12.6 Seconds      INR 0.93    aPTT [[id number]]  (Normal) Collected: 12/31/23 2315    Specimen: Blood from Arm, Right Updated: 01/01/24 0013     PTT 27.0 seconds     CBC Auto Differential [[id number]]  (Normal) Collected: 12/31/23 2315    Specimen: Blood from Arm, Right Updated: 12/31/23 2330     WBC 8.38 10*3/mm3      RBC 4.81 10*6/mm3      Hemoglobin 14.2 g/dL      Hematocrit 43.3 %      MCV 90.0 fL      MCH 29.5 pg      MCHC 32.8 g/dL      RDW 13.7 %      RDW-SD 45.6 fl      MPV 9.6 fL      Platelets 207 10*3/mm3      Neutrophil % 60.4 %      Lymphocyte % 30.4 %      Monocyte % 7.3 %      Eosinophil % 1.3 %      Basophil % 0.4 %      Immature Grans % 0.2 %      Neutrophils, Absolute 5.06 10*3/mm3      Lymphocytes, Absolute 2.55 10*3/mm3      Monocytes, Absolute 0.61 10*3/mm3      Eosinophils, Absolute 0.11 10*3/mm3      Basophils, Absolute 0.03 10*3/mm3      Immature Grans, Absolute 0.02 10*3/mm3      nRBC 0.1 /100 WBC             Imaging Results (Last 24 Hours)       Procedure Component Value Units Date/Time     CT Abdomen Pelvis Without Contrast [[id number]] Collected: 01/01/24 0040     Updated: 01/01/24 0040    Narrative:        Patient: HECTOR VILCHIS  Time Out: 00:40  Exam(s): CT ABDOMEN + PELVIS Without Contrast     EXAM:    CT Abdomen and Pelvis Without Intravenous Contrast    CLINICAL HISTORY:     Reason for exam: lower abd pain hematuria.    TECHNIQUE:    Axial computed tomography images of the abdomen and pelvis without   intravenous contrast.  CTDI is 15 mGy and DLP is 858 mGy-cm.  This CT   exam was performed according to the principle of ALARA (As Low As   Reasonably Achievable) by using one or more of the following dose   reduction techniques: automated exposure control, adjustment of the mA   and or kV according to patient size, and or use of iterative   reconstruction technique.    COMPARISON:    8 24 2023, 7 26 2021    FINDINGS:    Lung bases:  Atelectasis at the lung bases.     ABDOMEN:    Liver:  Unremarkable.    Gallbladder and bile ducts:  Cholecystectomy.    Pancreas:  Unremarkable.    Spleen:  Unremarkable.    Adrenals:  Unremarkable.    Kidneys and ureters:  Potentially retained wire within the left ureter   is unchanged.  No hydronephrosis in the kidneys.  No ureteral stone.    Stomach and bowel:  Unremarkable.     PELVIS:    Appendix:  No findings to suggest acute appendicitis.    Bladder:  Hematoma within the bladder.    Reproductive:  Median lobe hypertrophy within the prostate.     ABDOMEN and PELVIS:    Intraperitoneal space:  Unremarkable.  No free air.  No significant   fluid collection.    Bones joints:  No acute fracture.    Soft tissues:  Unremarkable.    Vasculature:  Unremarkable.    Lymph nodes:  Unremarkable.    IMPRESSION:       1.  Hematoma within the bladder.  2.  Potentially retained wire within the left ureter is unchanged.  3.  Median lobe hypertrophy within the prostate.      Impression:          Electronically signed by Ashutosh Marks MD on 01-01-24 at 0040            Results  for orders placed during the hospital encounter of 10/24/21    Adult Transthoracic Echo Complete W/ Cont if Necessary Per Protocol    Interpretation Summary  · Calculated left ventricular EF = 65.5% Estimated left ventricular EF was in agreement with the calculated left ventricular EF. Left ventricular systolic function is normal. Normal left ventricular cavity size noted. Left ventricular wall thickness is consistent with mild to moderate concentric hypertrophy. All left ventricular wall segments contract normally. Left ventricular diastolic function was normal.  · There is a focal area of calcification on the ventricular surface of the left coronary cusp. It moves with the valve but does not appear to be independently mobile. While calcific changes are most likely, a vegetation cannot be excluded.      No orders to display        Assessment/Plan     Active Hospital Problems    Diagnosis  POA    **Gross hematuria [R31.0]  Yes    HTN (hypertension) [I10]  Unknown    CECILIA (obstructive sleep apnea) [G47.33]  Unknown    BPH (benign prostatic hyperplasia) [N40.0]  Unknown    HLD (hyperlipidemia) [E78.5]  Unknown    Type 2 diabetes mellitus with diabetic polyneuropathy, without long-term current use of insulin [E11.42]  Unknown       Gross Hematuria  -He has a history of BPH and is s/p prostate and bladder surgery 11/15/23  -Urology consult  -Continue CBI  -His hgb is currently stable. Monitor H&H q 8 H. Transfuse for hgb less than 7  -Keep NPO  -PRN analgesia    Hypertension  -Blood pressures stable. Continue amlodipine and lisinopril  -Monitor    Type 2 Diabetes Mellitus  -Hold oral diabetic medications   -Initiate correctional factor insulin  -Monitor blood sugars ACHS  -Check hgb A1C    HLD  -Continue statin    CECILIA  -He did not bring a home CPAP to the hospital  -Supplemental oxygen as needed to keep sats greater than or equal to 92%  -Continuous pulse oximetry    Chronic Pain Syndrome  -Continue home pain  medications    -I discussed the patients findings and my recommendations with patient.    VTE Prophylaxis - SCDs.  Code Status - Full code.       KAYLEEN Garcia  [facility]  01/01/24  02:00 EST

## 2024-01-02 VITALS
DIASTOLIC BLOOD PRESSURE: 69 MMHG | SYSTOLIC BLOOD PRESSURE: 136 MMHG | OXYGEN SATURATION: 95 % | WEIGHT: 233.69 LBS | TEMPERATURE: 97.7 F | HEART RATE: 51 BPM | RESPIRATION RATE: 16 BRPM | HEIGHT: 71 IN | BODY MASS INDEX: 32.72 KG/M2

## 2024-01-02 LAB
BACTERIA SPEC AEROBE CULT: NO GROWTH
GLUCOSE BLDC GLUCOMTR-MCNC: 147 MG/DL (ref 70–130)
GLUCOSE BLDC GLUCOMTR-MCNC: 270 MG/DL (ref 70–130)
HCT VFR BLD AUTO: 38.9 % (ref 37.5–51)
HCT VFR BLD AUTO: 40.8 % (ref 37.5–51)
HGB BLD-MCNC: 12.6 G/DL (ref 13–17.7)
HGB BLD-MCNC: 13.4 G/DL (ref 13–17.7)

## 2024-01-02 PROCEDURE — 85018 HEMOGLOBIN: CPT | Performed by: NURSE PRACTITIONER

## 2024-01-02 PROCEDURE — 36415 COLL VENOUS BLD VENIPUNCTURE: CPT | Performed by: NURSE PRACTITIONER

## 2024-01-02 PROCEDURE — 85014 HEMATOCRIT: CPT | Performed by: NURSE PRACTITIONER

## 2024-01-02 PROCEDURE — 82948 REAGENT STRIP/BLOOD GLUCOSE: CPT

## 2024-01-02 PROCEDURE — 63710000001 INSULIN LISPRO (HUMAN) PER 5 UNITS: Performed by: NURSE PRACTITIONER

## 2024-01-02 RX ORDER — AMLODIPINE BESYLATE 2.5 MG/1
5 TABLET ORAL NIGHTLY
Start: 2024-01-02

## 2024-01-02 RX ORDER — CEPHALEXIN 500 MG/1
500 CAPSULE ORAL EVERY 6 HOURS SCHEDULED
Qty: 11 CAPSULE | Refills: 0 | Status: SHIPPED | OUTPATIENT
Start: 2024-01-02 | End: 2024-01-05

## 2024-01-02 RX ORDER — CEPHALEXIN 500 MG/1
500 CAPSULE ORAL EVERY 6 HOURS SCHEDULED
Qty: 11 CAPSULE | Refills: 0 | Status: SHIPPED | OUTPATIENT
Start: 2024-01-02 | End: 2024-01-02 | Stop reason: SDUPTHER

## 2024-01-02 RX ORDER — PHENAZOPYRIDINE HYDROCHLORIDE 100 MG/1
100 TABLET, FILM COATED ORAL 3 TIMES DAILY PRN
Qty: 15 TABLET | Refills: 0 | Status: SHIPPED | OUTPATIENT
Start: 2024-01-02 | End: 2024-01-02 | Stop reason: SDUPTHER

## 2024-01-02 RX ORDER — PHENAZOPYRIDINE HYDROCHLORIDE 100 MG/1
100 TABLET, FILM COATED ORAL 3 TIMES DAILY PRN
Qty: 15 TABLET | Refills: 0 | Status: SHIPPED | OUTPATIENT
Start: 2024-01-02

## 2024-01-02 RX ORDER — LISINOPRIL 2.5 MG/1
2.5 TABLET ORAL NIGHTLY
Start: 2024-01-02

## 2024-01-02 RX ORDER — CEPHALEXIN 500 MG/1
500 CAPSULE ORAL EVERY 12 HOURS SCHEDULED
Status: DISCONTINUED | OUTPATIENT
Start: 2024-01-02 | End: 2024-01-02

## 2024-01-02 RX ORDER — PHENAZOPYRIDINE HYDROCHLORIDE 100 MG/1
100 TABLET, FILM COATED ORAL 3 TIMES DAILY PRN
Status: DISCONTINUED | OUTPATIENT
Start: 2024-01-02 | End: 2024-01-02 | Stop reason: HOSPADM

## 2024-01-02 RX ORDER — NALOXONE HYDROCHLORIDE 4 MG/.1ML
SPRAY NASAL
Qty: 2 EACH | Refills: 0 | Status: SHIPPED | OUTPATIENT
Start: 2024-01-02 | End: 2024-01-02 | Stop reason: SDUPTHER

## 2024-01-02 RX ORDER — CEPHALEXIN 500 MG/1
500 CAPSULE ORAL EVERY 6 HOURS SCHEDULED
Status: DISCONTINUED | OUTPATIENT
Start: 2024-01-02 | End: 2024-01-02 | Stop reason: HOSPADM

## 2024-01-02 RX ORDER — NALOXONE HYDROCHLORIDE 4 MG/.1ML
SPRAY NASAL
Qty: 2 EACH | Refills: 0 | Status: SHIPPED | OUTPATIENT
Start: 2024-01-02

## 2024-01-02 RX ORDER — OXYCODONE AND ACETAMINOPHEN 10; 325 MG/1; MG/1
1 TABLET ORAL EVERY 6 HOURS PRN
Start: 2024-01-02

## 2024-01-02 RX ADMIN — OXYCODONE AND ACETAMINOPHEN 2 TABLET: 7.5; 325 TABLET ORAL at 08:47

## 2024-01-02 RX ADMIN — CEPHALEXIN 500 MG: 500 CAPSULE ORAL at 10:00

## 2024-01-02 RX ADMIN — Medication 10 ML: at 08:47

## 2024-01-02 RX ADMIN — INSULIN LISPRO 4 UNITS: 100 INJECTION, SOLUTION INTRAVENOUS; SUBCUTANEOUS at 12:19

## 2024-01-02 NOTE — CASE MANAGEMENT/SOCIAL WORK
Case Management Discharge Note      Final Note: Home with spouse post voiding trial. No needs. Family transport         Selected Continued Care - Discharged on 1/2/2024 Admission date: 12/31/2023 - Discharge disposition: Home or Self Care      Destination    No services have been selected for the patient.                Durable Medical Equipment    No services have been selected for the patient.                Dialysis/Infusion    No services have been selected for the patient.                Home Medical Care    No services have been selected for the patient.                Therapy    No services have been selected for the patient.                Community Resources    No services have been selected for the patient.                Community & DME    No services have been selected for the patient.                    Transportation Services  Private: Car    Final Discharge Disposition Code: 01 - home or self-care

## 2024-01-02 NOTE — DISCHARGE SUMMARY
NAME: Flaco Miles Sr. ADMIT: 2023   : 1965  PCP: Provider, No Known    MRN: [id number] LOS: 1 days   AGE/SEX: 58 y.o. male  ROOM: E459/1     Date of Admission:  2023  Date of Discharge:  2024    PCP: Provider, No Known    CHIEF COMPLAINT  Blood in Urine      DISCHARGE DIAGNOSIS  Active Hospital Problems    Diagnosis  POA    **Gross hematuria [R31.0]  Yes    HTN (hypertension) [I10]  Unknown    CECILIA (obstructive sleep apnea) [G47.33]  Unknown    BPH (benign prostatic hyperplasia) [N40.0]  Unknown    HLD (hyperlipidemia) [E78.5]  Unknown    Type 2 diabetes mellitus with diabetic polyneuropathy, without long-term current use of insulin [E11.42]  Unknown      Resolved Hospital Problems   No resolved problems to display.       SECONDARY DIAGNOSES  Past Medical History:   Diagnosis Date    Anesthesia complication     WAKES VIOLENTLY.  UNKNOWN REASON    Arthrofibrosis of knee joint, left     Constipation     DDD (degenerative disc disease), cervical     DJD (degenerative joint disease)     Gout     History of colon polyps     History of kidney stones     Hyperlipidemia     Hypertension     Left knee pain     Limited range of motion (ROM) of shoulder     BOTH SHOULDERS    Migraines     Prostate cancer 2017    SEED IMPLANTS    Rotator cuff tear, right     Sleep apnea     NO MACHINE    Type 2 diabetes mellitus        CONSULTS   Urology     HOSPITAL COURSE  Mr. Miles is a 58 y.o. former smoker with a history of BPH, hypertension, type 2 diabetes mellitus, hyperlipidemia, obstructive sleep apnea, previous  surgery that presents to [facility] complaining of bloody urine.       Have him on continuous bladder irrigation. PRN agents PO/IV for pain control, h/o chronic opioide use so needing higher doses due to tolerance.  Urology evaluation on  who said the following:    DC Avalos for void trial today - OK for DC later today  Will arrange OV with Dr. Manriquez in 1-2 weeks for  f/u and bladder scan  Avoid strenuous activity in the interim  Would give 3 days of keflex at time of DC    He can be dischaged this afternoon if voiding well.     DIAGNOSTICS      Procedure Component Value Units Date/Time    CT Abdomen Pelvis Without Contrast [[id number]] Marbin as Reviewed   Order Status: Completed Collected: 01/01/24 0040    Updated: 01/01/24 0040   Narrative:       Patient: HECTOR VILCHIS  Time Out: 00:40  Exam(s): CT ABDOMEN + PELVIS Without Contrast    EXAM:    CT Abdomen and Pelvis Without Intravenous Contrast    CLINICAL HISTORY:     Reason for exam: lower abd pain hematuria.    TECHNIQUE:    Axial computed tomography images of the abdomen and pelvis without  intravenous contrast.  CTDI is 15 mGy and DLP is 858 mGy-cm.  This CT  exam was performed according to the principle of ALARA (As Low As  Reasonably Achievable) by using one or more of the following dose  reduction techniques: automated exposure control, adjustment of the mA  and or kV according to patient size, and or use of iterative  reconstruction technique.    COMPARISON:    8 24 2023, 7 26 2021    FINDINGS:    Lung bases:  Atelectasis at the lung bases.     ABDOMEN:    Liver:  Unremarkable.    Gallbladder and bile ducts:  Cholecystectomy.    Pancreas:  Unremarkable.    Spleen:  Unremarkable.    Adrenals:  Unremarkable.    Kidneys and ureters:  Potentially retained wire within the left ureter  is unchanged.  No hydronephrosis in the kidneys.  No ureteral stone.    Stomach and bowel:  Unremarkable.     PELVIS:    Appendix:  No findings to suggest acute appendicitis.    Bladder:  Hematoma within the bladder.    Reproductive:  Median lobe hypertrophy within the prostate.     ABDOMEN and PELVIS:    Intraperitoneal space:  Unremarkable.  No free air.  No significant  fluid collection.    Bones joints:  No acute fracture.    Soft tissues:  Unremarkable.    Vasculature:  Unremarkable.    Lymph nodes:  Unremarkable.    IMPRESSION:      1.   Hematoma within the bladder.  2.  Potentially retained wire within the left ureter is unchanged.  3.  Median lobe hypertrophy within the prostate.   Impression:              Collected Updated Procedure Result Status    01/02/2024 0754 01/02/2024 0813 Hemoglobin & Hematocrit, Blood [[id number]]   Blood    Final result Component Value Units   Hemoglobin 13.4 g/dL   Hematocrit 40.8 %          01/02/2024 0002 01/02/2024 0054 Hemoglobin & Hematocrit, Blood [[id number]]   (Abnormal)   Blood    Final result Component Value Units   Hemoglobin 12.6 Low  g/dL   Hematocrit 38.9 %          01/01/2024 1541 01/01/2024 1609 Hemoglobin & Hematocrit, Blood [[id number]]   Blood    Final result Component Value Units   Hemoglobin 13.0 g/dL   Hematocrit 39.0 %          01/01/2024 1516 01/02/2024 0942 Urine Culture - Urine, Indwelling Urethral Catheter [[id number]]   Urine from Indwelling Urethral Catheter    Preliminary result Component Value   Urine Culture No growth P             01/01/2024 0510 01/01/2024 0547 Basic Metabolic Panel [[id number]]    (Abnormal)   Blood    Final result Component Value Units   Glucose 218 High  mg/dL   BUN 15 mg/dL   Creatinine 0.81 mg/dL   Sodium 140 mmol/L   Potassium 4.3 mmol/L   Chloride 104 mmol/L   CO2 27.5 mmol/L   Calcium 8.7 mg/dL   BUN/Creatinine Ratio 18.5    Anion Gap 8.5 mmol/L   eGFR 102.8 mL/min/1.73          01/01/2024 0510 01/01/2024 0554 CBC (No Diff) [[id number]]   (Abnormal)   Blood    Final result Component Value Units   WBC 8.10 10*3/mm3   RBC 4.54 10*6/mm3   Hemoglobin 12.5 Low  g/dL   Hematocrit 39.5 %   MCV 87.0 fL   MCH 27.5 pg   MCHC 31.6 g/dL   RDW 13.6 %   RDW-SD 43.2 fl   MPV 8.9 fL   Platelets 204 10*3/mm3          12/31/2023 2315 12/31/2023 2348 Comprehensive Metabolic Panel [[id number]]    (Abnormal)   Blood from Arm, Right    Final result Component Value Units   Glucose 357 High  mg/dL   BUN 15 mg/dL   Creatinine 1.04 mg/dL   Sodium 137 mmol/L   Potassium 4.4 mmol/L   Chloride  99 mmol/L   CO2 26.0 mmol/L   Calcium 9.3 mg/dL   Total Protein 6.5 g/dL   Albumin 4.2 g/dL   ALT (SGPT) 14 U/L   AST (SGOT) 16 U/L   Alkaline Phosphatase 100 U/L   Total Bilirubin 0.9 mg/dL   Globulin 2.3 gm/dL   A/G Ratio 1.8 g/dL   BUN/Creatinine Ratio 14.4    Anion Gap 12.0 mmol/L   eGFR 83.7 mL/min/1.73          12/31/2023 2315 01/01/2024 0013 Protime-INR [[id number]]   Blood from Arm, Right    Final result Component Value Units   Protime 12.6 Seconds   INR 0.93           12/31/2023 2315 01/01/2024 0013 aPTT [[id number]]   Blood from Arm, Right    Final result Component Value Units   PTT 27.0 seconds          12/31/2023 2315 12/31/2023 2330 CBC Auto Differential [[id number]]   Blood from Arm, Right    Final result Component Value Units   WBC 8.38 10*3/mm3   RBC 4.81 10*6/mm3   Hemoglobin 14.2 g/dL   Hematocrit 43.3 %   MCV 90.0 fL   MCH 29.5 pg   MCHC 32.8 g/dL   RDW 13.7 %   RDW-SD 45.6 fl   MPV 9.6 fL   Platelets 207 10*3/mm3   Neutrophil % 60.4 %   Lymphocyte % 30.4 %   Monocyte % 7.3 %   Eosinophil % 1.3 %   Basophil % 0.4 %   Immature Grans % 0.2 %   Neutrophils, Absolute 5.06 10*3/mm3   Lymphocytes, Absolute 2.55 10*3/mm3   Monocytes, Absolute 0.61 10*3/mm3   Eosinophils, Absolute 0.11 10*3/mm3   Basophils, Absolute 0.03 10*3/mm3   Immature Grans, Absolute 0.02 10*3/mm3   nRBC 0.1 /100 WBC          12/31/2023 2252 12/31/2023 2339 Urinalysis With Microscopic If Indicated (No Culture) - Urine, Clean Catch [[id number]]   (Abnormal)   Urine, Clean Catch    Final result Component Value   Color, UA Yellow   Appearance, UA Cloudy Abnormal    pH, UA 6.5   Specific Gravity, UA >=1.030   Glucose, UA >=1000 mg/dL (3+) Abnormal    Ketones, UA Negative   Bilirubin, UA Negative   Blood, UA Large (3+) Abnormal    Protein, UA >=300 mg/dL (3+) Abnormal    Leuk Esterase, UA Negative   Nitrite, UA Negative   Urobilinogen, UA 0.2 E.U./dL          12/31/2023 2252 12/31/2023 2345 Urinalysis, Microscopic Only - Urine, Clean Catch  "[[id number]]   (Abnormal)   Urine, Clean Catch    Final result   Component Value Units   RBC, UA Too Numerous to Count Abnormal  /HPF   WBC, UA Unable to determine due to loaded field Abnormal  /HPF   Bacteria, UA Unable to determine due to loaded field Abnormal  /HPF   Squamous Epithelial Cells, UA Unable to determine due to loaded field Abnormal  /HPF   Hyaline Casts, UA Unable to determine due to loaded field /LPF   Methodology Manual Light Microscopy         PHYSICAL EXAM  Objective:  Vital signs: (most recent): Blood pressure 136/69, pulse 51, temperature 97.7 °F (36.5 °C), temperature source Oral, resp. rate 16, height 180.3 cm (71\"), weight 106 kg (233 lb 11 oz), SpO2 95%.                Alert  nad  No resp distress  Soft, nt  No montejo  Hopeful for discharge    CONDITION ON DISCHARGE  Stable.      DISCHARGE DISPOSITION   Home or Self Care      DISCHARGE MEDICATIONS       Your medication list        START taking these medications        Instructions Last Dose Given Next Dose Due   cephalexin 500 MG capsule  Commonly known as: KEFLEX      Take 1 capsule by mouth Every 6 (Six) Hours for 11 doses. Indications: Urinary Tract Infection       naloxone 4 MG/0.1ML nasal spray  Commonly known as: NARCAN      Call 911. Don't prime. [place] in 1 nostril for overdose. Repeat in 2-3 minutes in other nostril if no or minimal breathing/responsiveness.       phenazopyridine 100 MG tablet  Commonly known as: PYRIDIUM      Take 1 tablet by mouth 3 (Three) Times a Day As Needed for Bladder Spasms (burning with urination).              CONTINUE taking these medications        Instructions Last Dose Given Next Dose Due   allopurinol 300 MG tablet  Commonly known as: ZYLOPRIM      Take 1 tablet by mouth Every Night.       amLODIPine 2.5 MG tablet  Commonly known as: NORVASC      Take 2 tablets by mouth Every Night.       atorvastatin 10 MG tablet  Commonly known as: LIPITOR      Take 4 tablets by mouth Every Night.       DULoxetine 60 " MG capsule  Commonly known as: CYMBALTA      Take 2 capsules by mouth Every Night.       lisinopril 2.5 MG tablet  Commonly known as: PRINIVIL,ZESTRIL      Take 1 tablet by mouth Every Night.       oxyCODONE-acetaminophen  MG per tablet  Commonly known as: PERCOCET      Take 1 tablet by mouth Every 6 (Six) Hours As Needed for Severe Pain.       pregabalin 150 MG capsule  Commonly known as: LYRICA      Take 2 capsules by mouth Every Night.       tiZANidine 4 MG tablet  Commonly known as: ZANAFLEX      Take 1 tablet by mouth 2 (Two) Times a Day As Needed for Muscle Spasms.                 Where to Get Your Medications        These medications were sent to [address] [address]      Hours: Monday to Friday 7 AM to 6 PM, Saturday & Sunday 8 AM to 4:30 PM (Closed 12 PM to 12:30 PM) Phone: [phone]   cephalexin 500 MG capsule  naloxone 4 MG/0.1ML nasal spray  phenazopyridine 100 MG tablet       Information about where to get these medications is not yet available    Ask your nurse or doctor about these medications  amLODIPine 2.5 MG tablet  lisinopril 2.5 MG tablet  oxyCODONE-acetaminophen  MG per tablet        No future appointments.  Additional Instructions for the Follow-ups that You Need to Schedule       Discharge Follow-up with Specialty: PCP in 2 weeks, Dr. Manriquez in 1-2 weeks   As directed      Specialty: PCP in 2 weeks, Dr. Manriquez in 1-2 weeks               Follow-up Information       Provider, No Known .    Contact information:  [place] [id number] [phone]                             TEST  RESULTS PENDING AT DISCHARGE  Pending Labs       Order Current Status    Urine Culture - Urine, Indwelling Urethral Catheter Preliminary result               Jeramy Barron MD  [facility]  01/02/24  11:11 EST      Time: greater than 32 minutes on discharge  It was a pleasure taking care of this  patient while in the hospital.

## 2024-01-02 NOTE — PROGRESS NOTES
F/U for GH and clot retention  Had 24 Fr Felicity placed, bladder irrigated to clear  On CBI overnight  Urine remained clear, no hand irrigation needed    AVSS, good UOP  Urine clear, occas fragments of old clot but no active bleeding  Abdom benign  H/H stable    DC Avalos for void trial today - OK for DC later today  Will arrange OV with Dr. Manriquez in 1-2 weeks for f/u and bladder scan  Avoid strenuous activity in the interim  Would give 3 days of keflex at time of DC    Call if difficulty voiding later today  Will sign off

## 2024-01-02 NOTE — PLAN OF CARE
Goal Outcome Evaluation:   Received on continues bladder irrigation with slight pinkish output noted. Adjust the irrigation flow to make sure is output will be clear. Medicated with prn pain medicine per request. H&H done every 8 hour latest Hgb was 12.6 at 0000 hr. Able to sleep and rest . Will continue to monitor.

## 2024-01-03 ENCOUNTER — READMISSION MANAGEMENT (OUTPATIENT)
Dept: CALL CENTER | Facility: HOSPITAL | Age: 59
End: 2024-01-03
Payer: COMMERCIAL

## 2024-01-03 NOTE — OUTREACH NOTE
Prep Survey      Flowsheet Row Responses   [facility] facility patient discharged from? [place]   Is LACE score < 7 ? No   Eligibility Readm Mgmt   Discharge diagnosis Gross hematuria   Does the patient have one of the following disease processes/diagnoses(primary or secondary)? Other   Does the patient have Home health ordered? No   Is there a DME ordered? No   Comments regarding appointments voiding trial, f/u with urology   Medication alerts for this patient see avs--keflex ordered   Prep survey completed? Yes            Sofia RITCHIE - Registered Nurse

## 2024-01-12 ENCOUNTER — READMISSION MANAGEMENT (OUTPATIENT)
Dept: CALL CENTER | Facility: HOSPITAL | Age: 59
End: 2024-01-12
Payer: COMMERCIAL

## 2024-01-12 NOTE — OUTREACH NOTE
Medical Week 2 Survey      Flowsheet Row Responses   [facility] patient discharged from? [place]   Does the patient have one of the following disease processes/diagnoses(primary or secondary)? Other   Week 2 attempt successful? Yes   Call start time 1525   Discharge diagnosis Gross hematuria   Call end time 1530   Meds reviewed with patient/caregiver? Yes   Is the patient having any side effects they believe may be caused by any medication additions or changes? No   Does the patient have all medications ordered at discharge? Yes   Is the patient taking all medications as directed (includes completed medication regime)? Yes   Does the patient have a primary care provider?  Yes   Does the patient have an appointment with their PCP within 7 days of discharge? No   Comments regarding PCP Patient is seeing his urologist on Monday 1/15/24   Has the patient kept scheduled appointments due by today? N/A   Has home health visited the patient within 72 hours of discharge? N/A   Psychosocial issues? No   Did the patient receive a copy of their discharge instructions? Yes   Nursing interventions Reviewed instructions with patient   What is the patient's perception of their health status since discharge? Improving   Is the patient/caregiver able to teach back signs and symptoms related to disease process for when to call PCP? Yes   Is the patient/caregiver able to teach back signs and symptoms related to disease process for when to call 911? Yes   Is the patient/caregiver able to teach back the hierarchy of who to call/visit for symptoms/problems? PCP, Specialist, Home health nurse, Urgent Care, ED, 911 Yes   If the patient is a current smoker, are they able to teach back resources for cessation? [id number]   Week 2 Call Completed? Yes   Call end time 1530            Funmilayo Saavedra Licensed Nurse

## 2025-08-19 ENCOUNTER — APPOINTMENT (OUTPATIENT)
Dept: CT IMAGING | Facility: HOSPITAL | Age: 60
End: 2025-08-19
Payer: COMMERCIAL

## 2025-08-19 ENCOUNTER — HOSPITAL ENCOUNTER (EMERGENCY)
Facility: HOSPITAL | Age: 60
Discharge: HOME | End: 2025-08-19
Attending: STUDENT IN AN ORGANIZED HEALTH CARE EDUCATION/TRAINING PROGRAM | Admitting: STUDENT IN AN ORGANIZED HEALTH CARE EDUCATION/TRAINING PROGRAM
Payer: COMMERCIAL

## (undated) DEVICE — THE TORRENT IRRIGATION SCOPE CONNECTOR IS USED WITH THE TORRENT IRRIGATION TUBING TO PROVIDE IRRIGATION FLUIDS SUCH AS STERILE WATER DURING GASTROINTESTINAL ENDOSCOPIC PROCEDURES WHEN USED IN CONJUNCTION WITH AN IRRIGATION PUMP (OR ELECTROSURGICAL UNIT).: Brand: TORRENT

## (undated) DEVICE — GW EMR FIX EXCHG J STD .035 3MM 260CM

## (undated) DEVICE — GAUZE,SPONGE,FLUFF,6"X6.75",STRL,10/TRAY: Brand: MEDLINE

## (undated) DEVICE — ADAPT CLN BIOGUARD AIR/H2O DISP

## (undated) DEVICE — SINGLE-USE BIOPSY FORCEPS: Brand: RADIAL JAW 4

## (undated) DEVICE — ANTIBACTERIAL UNDYED BRAIDED (POLYGLACTIN 910), SYNTHETIC ABSORBABLE SUTURE: Brand: COATED VICRYL

## (undated) DEVICE — CATH DIAG IMPULSE FL3.5 5F 100CM

## (undated) DEVICE — CLEAR-TRAC 7.0 MM X 72 MM THREADED                                    CANNULA, WITH DISPOSABLE OBTURATOR,                                    GREY, STERILE: Brand: CLEAR-TRAC

## (undated) DEVICE — ABL ASP APOLLO RF XL 90D

## (undated) DEVICE — APPL DURAPREP IODOPHOR APL 26ML

## (undated) DEVICE — PK KN TOTL 40

## (undated) DEVICE — CANNULA THREADED DISP 8.5 X 72MM: Brand: CLEAR-TRAC

## (undated) DEVICE — GLV SURG TRIUMPH CLASSIC PF LTX 8.5 STRL

## (undated) DEVICE — BLD SHAVER BONECUTTER 5MM 13CM

## (undated) DEVICE — BNDG ELAS ELITE V/CLOSE 6IN 5YD LF STRL

## (undated) DEVICE — TBG ARTHSCP PT W CONN/REDUC 8FT

## (undated) DEVICE — BIT DRL JUGGERKNOT 2.9MM

## (undated) DEVICE — PAD GRND REM POLYHESIVE A/ DISP

## (undated) DEVICE — GLIDESHEATH SLENDER STAINLESS STEEL KIT: Brand: GLIDESHEATH SLENDER

## (undated) DEVICE — KT MANIFLD CARDIAC

## (undated) DEVICE — PK ARTHSCP SHLDR TOWER 40

## (undated) DEVICE — ENCORE® LATEX ORTHO SIZE 8.5, STERILE LATEX POWDER-FREE SURGICAL GLOVE: Brand: ENCORE

## (undated) DEVICE — BUR SHAVER CLEARCUT 12FLUT 5MM 13CM

## (undated) DEVICE — SKIN PREP TRAY W/CHG: Brand: MEDLINE INDUSTRIES, INC.

## (undated) DEVICE — STPLR SKIN VISISTAT WD 35CT

## (undated) DEVICE — DRAPE,SHOULDER,BEACH ULTRAGARD: Brand: MEDLINE

## (undated) DEVICE — SOL NACL 0.9PCT 1000ML

## (undated) DEVICE — CANN O2 ETCO2 FITS ALL CONN CO2 SMPL A/ 7IN DISP LF

## (undated) DEVICE — NDL HYPO PRECISIONGLIDE REG 25G 1 1/2

## (undated) DEVICE — SPNG GZ WOVN 4X4IN 12PLY 10/BX STRL

## (undated) DEVICE — LN SMPL CO2 SHTRM SD STREAM W/M LUER

## (undated) DEVICE — CATH VENT MIV RADL PIG ST TIP 5F 110CM

## (undated) DEVICE — DRSNG WND GZ CURAD OIL EMULSION 3X8IN LF STRL 1PK

## (undated) DEVICE — KT DRN EVAC WND PVC PCH WTROC RND 10F400

## (undated) DEVICE — GLV SURG SIGNATURE ESSENTIAL PF LTX SZ8

## (undated) DEVICE — CATH DIAG IMPULSE FR4 5F 100CM

## (undated) DEVICE — UNDERCAST PADDING: Brand: DEROYAL

## (undated) DEVICE — TP NDL SCORPION MULTIFIRE

## (undated) DEVICE — DRSNG GZ PETROLTM XEROFORM CURAD 1X8IN STRL

## (undated) DEVICE — TUBING, SUCTION, 1/4" X 10', STRAIGHT: Brand: MEDLINE

## (undated) DEVICE — BANDAGE,GAUZE,BULKEE II,4.5"X4.1YD,STRL: Brand: MEDLINE

## (undated) DEVICE — PK CATH CARD 40

## (undated) DEVICE — 2108 SERIES SAGITTAL BLADE, NO OFFSET  (12.4 X 1.19 X 82.1MM)

## (undated) DEVICE — GLV SURG BIOGEL LTX PF 8

## (undated) DEVICE — THE SINGLE USE ETRAP – POLYP TRAP IS USED FOR SUCTION RETRIEVAL OF ENDOSCOPICALLY REMOVED POLYPS.: Brand: ETRAP

## (undated) DEVICE — KT ORCA ORCAPOD DISP STRL

## (undated) DEVICE — NDL SPINE 20G 3 1/2 YEL STRL 1P/U

## (undated) DEVICE — SUT PROLN 3/0 FS2 18IN 8665G

## (undated) DEVICE — SENSR O2 OXIMAX FNGR A/ 18IN NONSTR

## (undated) DEVICE — PREP SOL POVIDONE/IODINE BT 4OZ

## (undated) DEVICE — SNAR POLYP SENSATION STDOVL 27 240 BX40

## (undated) DEVICE — NET RETRV ROTHNET SELCT 2.5MM 3X6X230CM NS

## (undated) DEVICE — DUAL CUT SAGITTAL BLADE

## (undated) DEVICE — SYR LUERLOK 20CC

## (undated) DEVICE — BNDG CURITY ADHS 1 1/2X1 1/2IN

## (undated) DEVICE — CATH DIAG IMPULSE MPA2 SH 5F 100CM